# Patient Record
Sex: FEMALE | Race: WHITE | NOT HISPANIC OR LATINO | Employment: FULL TIME | URBAN - METROPOLITAN AREA
[De-identification: names, ages, dates, MRNs, and addresses within clinical notes are randomized per-mention and may not be internally consistent; named-entity substitution may affect disease eponyms.]

---

## 2017-01-31 ENCOUNTER — OFFICE VISIT (OUTPATIENT)
Dept: ENDOCRINOLOGY | Facility: CLINIC | Age: 37
End: 2017-01-31
Payer: COMMERCIAL

## 2017-01-31 VITALS
TEMPERATURE: 98.3 F | HEART RATE: 51 BPM | DIASTOLIC BLOOD PRESSURE: 79 MMHG | HEIGHT: 67 IN | WEIGHT: 258.9 LBS | SYSTOLIC BLOOD PRESSURE: 134 MMHG | BODY MASS INDEX: 40.63 KG/M2 | RESPIRATION RATE: 16 BRPM

## 2017-01-31 DIAGNOSIS — E04.9 GOITER: ICD-10-CM

## 2017-01-31 DIAGNOSIS — R63.5 ABNORMAL WEIGHT GAIN: Primary | ICD-10-CM

## 2017-01-31 DIAGNOSIS — E78.5 HYPERLIPIDEMIA LDL GOAL <130: ICD-10-CM

## 2017-01-31 LAB
ALBUMIN SERPL-MCNC: 3.6 G/DL (ref 3.4–5)
ALP SERPL-CCNC: 71 U/L (ref 40–150)
ALT SERPL W P-5'-P-CCNC: 59 U/L (ref 0–50)
ANION GAP SERPL CALCULATED.3IONS-SCNC: 5 MMOL/L (ref 3–14)
AST SERPL W P-5'-P-CCNC: 31 U/L (ref 0–45)
BILIRUB SERPL-MCNC: 0.3 MG/DL (ref 0.2–1.3)
BUN SERPL-MCNC: 11 MG/DL (ref 7–30)
CALCIUM SERPL-MCNC: 8.8 MG/DL (ref 8.5–10.1)
CHLORIDE SERPL-SCNC: 109 MMOL/L (ref 94–109)
CHOLEST SERPL-MCNC: 161 MG/DL
CO2 SERPL-SCNC: 26 MMOL/L (ref 20–32)
CREAT SERPL-MCNC: 0.73 MG/DL (ref 0.52–1.04)
GFR SERPL CREATININE-BSD FRML MDRD: ABNORMAL ML/MIN/1.7M2
GLUCOSE SERPL-MCNC: 93 MG/DL (ref 70–99)
HDLC SERPL-MCNC: 37 MG/DL
LDLC SERPL CALC-MCNC: 104 MG/DL
NONHDLC SERPL-MCNC: 124 MG/DL
POTASSIUM SERPL-SCNC: 4.2 MMOL/L (ref 3.4–5.3)
PROT SERPL-MCNC: 6.7 G/DL (ref 6.8–8.8)
SODIUM SERPL-SCNC: 140 MMOL/L (ref 133–144)
T3FREE SERPL-MCNC: 2.7 PG/ML (ref 2.3–4.2)
T4 FREE SERPL-MCNC: 1.02 NG/DL (ref 0.76–1.46)
THYROGLOB AB SERPL IA-ACNC: <20 IU/ML (ref 0–40)
THYROPEROXIDASE AB SERPL-ACNC: <10 IU/ML
TRIGL SERPL-MCNC: 100 MG/DL
TSH SERPL DL<=0.05 MIU/L-ACNC: 2.13 MU/L (ref 0.4–4)

## 2017-01-31 PROCEDURE — 84439 ASSAY OF FREE THYROXINE: CPT | Performed by: CLINICAL NURSE SPECIALIST

## 2017-01-31 PROCEDURE — 36415 COLL VENOUS BLD VENIPUNCTURE: CPT | Performed by: CLINICAL NURSE SPECIALIST

## 2017-01-31 PROCEDURE — 84481 FREE ASSAY (FT-3): CPT | Performed by: CLINICAL NURSE SPECIALIST

## 2017-01-31 PROCEDURE — 86800 THYROGLOBULIN ANTIBODY: CPT | Performed by: CLINICAL NURSE SPECIALIST

## 2017-01-31 PROCEDURE — 86376 MICROSOMAL ANTIBODY EACH: CPT | Performed by: CLINICAL NURSE SPECIALIST

## 2017-01-31 PROCEDURE — 80053 COMPREHEN METABOLIC PANEL: CPT | Performed by: CLINICAL NURSE SPECIALIST

## 2017-01-31 PROCEDURE — 99202 OFFICE O/P NEW SF 15 MIN: CPT | Performed by: CLINICAL NURSE SPECIALIST

## 2017-01-31 PROCEDURE — 80061 LIPID PANEL: CPT | Performed by: CLINICAL NURSE SPECIALIST

## 2017-01-31 PROCEDURE — 84443 ASSAY THYROID STIM HORMONE: CPT | Performed by: CLINICAL NURSE SPECIALIST

## 2017-01-31 NOTE — PROGRESS NOTES
Name: Jocelyn Weber  Seen at the request of No ref. provider found For   Chief Complaint   Patient presents with     Thyroid Problem     HPI:  Jocelyn Weber is a 36 year old female who presents for the evaluation of steady weight gain over the years and inability to lose weight with effort.  She is concerned it may be due to thyroid disease.  The only time she was able to lose weight was in  while taking phentermine, was able to lose about 40 lbs.  + fatigue, depression, occasional cold intolerance and constipation.  FH is + for mother with hypothyroidism.  She has two children, ages 5 and 18 months.      PMH/PSH:  Past Medical History   Diagnosis Date     Obesity      Past Surgical History   Procedure Laterality Date      section  3/12     Tonsillectomy       Bunionectomy  96     Hc tooth extraction w/forcep        section N/A 2015     Procedure:  SECTION;  Surgeon: Becky Aceves MD;  [facility]      N/A 2015     Procedure: OUT OF OR RECOVERY;  Surgeon: GENERIC ANESTHESIA PROVIDER;  Location[facility] OR     Blood patch N/A 2015     Procedure: EPIDURAL BLOOD PATCH;  Surgeon: GENERIC ANESTHESIA PROVIDER;  [facility] OR     Family Hx:  Family History   Problem Relation Age of Onset     HEART DISEASE Maternal Grandmother 65     enlarged heart     HEART DISEASE Maternal Grandfather      HEART DISEASE Paternal Grandmother 40     HEART DISEASE Paternal Grandfather      CEREBROVASCULAR DISEASE Maternal Grandfather      CEREBROVASCULAR DISEASE Paternal Grandfather 40     Family History Negative Mother      Family History Negative Father      Thyroid disease: Yes: mother.         DM2: Yes: MGF         Autoimmune: DM1, SLE, RA, Vitiligo No    Social Hx:         MEDICATIONS:  has a current medication list which includes the following Facility-Administered Medications: bupivacaine 0.25 % - epinephrine 1:200,000 and ketorolac.    ROS     ROS: 10 point ROS  "neg other than the symptoms noted above in the HPI.    GENERAL: no weight loss, fevers, chills, malaise, night sweats.   HEENT: no dysphagia, odynophagia, diplopia, neck pain or tenderness  CV: no chest pain, pressure, palpitations, skipped beats, LOC  LUNGS: no SOB, PERDUE, cough, sputum production, wheezing   ABDOMEN: no diarrhea or abdominal pain, + occasional constipation  EXTREMITIES: no rashes, ulcers, edema  NEUROLOGY: no changes in vision, tingling or numbness in hands or feet.   MSK: no muscle aches or pains, weakness  SKIN: no rashes or lesions  ENDOCRINE: no heat or cold intolerance    Physical Exam   VS: /79 mmHg  Pulse 51  Temp(Src) 98.3  F (36.8  C) (Oral)  Resp 16  Ht 1.695 m (5' 6.75\")  Wt 117.436 kg (258 lb 14.4 oz)  BMI 40.88 kg/m2  GENERAL: AXOX3, NAD, well dressed, answering questions appropriately, appears stated age.  HEENT: no exophthalmos, no proptosis, EOMI, no lig lag, no retraction  NECK:  Supple, right-sided thyroid gland enlargement, left side palpably normal, no tenderness with palpation, no adenopathy.  CV: RRR, no rubs, gallops, no murmurs  LUNGS: CTAB, no wheezes, rales, or rhonchi  ABDOMEN: soft, nontender, nondistended  EXTREMITIES: no edema, +pulses, no rashes, no lesions  NEUROLOGY: CN grossly intact, no tremors  MSK: grossly intact  SKIN: no rashes, no lesions    LABS:  Vital Signs 9/30/2015 11/18/2016 1/31/2017   Weight (LB) 224 lb 265 lb 1 oz 258 lb 14.4 oz   Height  5' 7\" 5' 6.75\"   BMI  41.6 40.94     All pertinent notes, labs, and images personally reviewed by me.     A/P  Ms.Jonelle Weber is a 36 year old here for the evaluation of:    1. Abnormal weight gain    2. Goiter    3. Hyperlipidemia LDL goal <130        Labs ordered today:   Orders Placed This Encounter   Procedures     US Head Neck Soft Tissue     TSH     T4 FREE     T3 Free     Anti thyroglobulin antibody     Thyroid peroxidase antibody     Comprehensive metabolic panel     Lipid panel reflex to " direct LDL     Radiology/Consults ordered today: US HEAD NECK SOFT TISSUE    More than 50% of the time spent with Ms. Weber on counseling / coordinating her care.  Total face to face time was greater than or equal to 25 minutes.      Follow-up:  To be determined based on test results.    Nancy Goodson NP  Endocrinology  [facility]  CC: Carri Collins Ra

## 2017-01-31 NOTE — NURSING NOTE
"Chief Complaint   Patient presents with     Thyroid Problem     Initial /79 mmHg  Pulse 51  Temp(Src) 98.3  F (36.8  C) (Oral)  Resp 16  Ht 5' 6.75\" (1.695 m)  Wt 258 lb 14.4 oz (117.436 kg)  BMI 40.88 kg/m2 Estimated body mass index is 40.88 kg/(m^2) as calculated from the following:    Height as of this encounter: 5' 6.75\" (1.695 m).    Weight as of this encounter: 258 lb 14.4 oz (117.436 kg)..  BP completed using cuff size regular    Waist:  45\"    Shari Schoenberger, CMA    "

## 2017-02-02 NOTE — PROGRESS NOTES
Quick Note:    Jocelyn,  Your thyroid antibodies were not elevated at this time so no apparent autoimmune thyroid disease.  Your thyroid levels are also in a good range at this time, TSH looks great at just slightly over 2 with a healthy T4 hormone level.  I'll let you know the thyroid ultrasound results when available and also the saliva cortisol.  If you are interested in weight management medication, please schedule a visit with me to discuss and start.  Let me know if you have any questions.  Nancy Goodson NP  Endocrinology    ______

## 2017-02-05 PROCEDURE — 82530 CORTISOL FREE: CPT | Mod: 90 | Performed by: INTERNAL MEDICINE

## 2017-02-05 PROCEDURE — 99000 SPECIMEN HANDLING OFFICE-LAB: CPT | Performed by: INTERNAL MEDICINE

## 2017-02-07 DIAGNOSIS — E04.9 GOITER: ICD-10-CM

## 2017-02-07 DIAGNOSIS — E78.5 HYPERLIPIDEMIA LDL GOAL <130: ICD-10-CM

## 2017-02-07 DIAGNOSIS — R63.5 ABNORMAL WEIGHT GAIN: ICD-10-CM

## 2017-02-09 LAB
COLLECT TME SPEC: 2300
CORTIS SAL-MCNC: 0.06 UG/DL

## 2017-02-15 NOTE — PROGRESS NOTES
Jocelyn,  Your salivary cortisol result was normal.  Here's a copy for your records.  Nancy Goodson NP  Endocrinology

## 2017-03-09 ENCOUNTER — OFFICE VISIT (OUTPATIENT)
Dept: ENDOCRINOLOGY | Facility: CLINIC | Age: 37
End: 2017-03-09
Payer: COMMERCIAL

## 2017-03-09 VITALS
HEART RATE: 50 BPM | SYSTOLIC BLOOD PRESSURE: 114 MMHG | RESPIRATION RATE: 12 BRPM | BODY MASS INDEX: 40.74 KG/M2 | DIASTOLIC BLOOD PRESSURE: 72 MMHG | WEIGHT: 259.6 LBS | HEIGHT: 67 IN

## 2017-03-09 DIAGNOSIS — E78.5 HYPERLIPIDEMIA LDL GOAL <130: ICD-10-CM

## 2017-03-09 DIAGNOSIS — E66.01 MORBID OBESITY DUE TO EXCESS CALORIES (H): Primary | ICD-10-CM

## 2017-03-09 PROCEDURE — 99214 OFFICE O/P EST MOD 30 MIN: CPT | Performed by: CLINICAL NURSE SPECIALIST

## 2017-03-09 RX ORDER — PHENTERMINE HYDROCHLORIDE 37.5 MG/1
37.5 TABLET ORAL
Qty: 32 TABLET | Refills: 0 | Status: SHIPPED | OUTPATIENT
Start: 2017-03-09 | End: 2017-03-09

## 2017-03-09 RX ORDER — PHENTERMINE HYDROCHLORIDE 37.5 MG/1
37.5 TABLET ORAL
Qty: 32 TABLET | Refills: 0 | Status: SHIPPED | OUTPATIENT
Start: 2017-03-09 | End: 2017-04-12

## 2017-03-09 NOTE — NURSING NOTE
"Chief Complaint   Patient presents with     Follow Up For     weight loss       Initial /72 (BP Location: Left arm, Cuff Size: Adult Regular)  Pulse 50  Resp 12  Ht 1.695 m (5' 6.75\")  Wt 117.8 kg (259 lb 9.6 oz)  BMI 40.96 kg/m2 Estimated body mass index is 40.96 kg/(m^2) as calculated from the following:    Height as of this encounter: 1.695 m (5' 6.75\").    Weight as of this encounter: 117.8 kg (259 lb 9.6 oz).  Medication Reconciliation: complete     Waist:51\"    Kristine Silva [facility]    3/9/2017  8:59 AM      "

## 2017-03-09 NOTE — MR AVS SNAPSHOT
After Visit Summary   3/9/2017    Jocelyn Weber    MRN: [id number]           Patient Information     Date Of Birth          1980        Visit Information        Provider Department      3/9/2017 9:00 AM Nancy Goodson APRN CNP [facility]        Today's Diagnoses     Morbid obesity due to excess calories (H)    -  1    BMI 40.0-44.9, adult (H)        Hyperlipidemia LDL goal <130           Follow-ups after your visit        Follow-up notes from your care team     Return in about 4 weeks (around 4/6/2017).      Who to contact     If you have questions or need follow up information about today's clinic visit or your schedule please contact [facility] directly at [phone].  Normal or non-critical lab and imaging results will be communicated to you by Akerminhart, letter or phone within 4 business days after the clinic has received the results. If you do not hear from us within 7 days, please contact the clinic through Akerminhart or phone. If you have a critical or abnormal lab result, we will notify you by phone as soon as possible.  Submit refill requests through MeetMoi or call your pharmacy and they will forward the refill request to us. Please allow 3 business days for your refill to be completed.          Additional Information About Your Visit        MyChart Information     MeetMoi gives you secure access to your electronic health record. If you see a primary care provider, you can also send messages to your care team and make appointments. If you have questions, please call your primary care clinic.  If you do not have a primary care provider, please call [phone] and they will assist you.        Care EveryWhere ID     This is your Care EveryWhere ID. This could be used by other organizations to access your [place] medical records  [id number]        Your Vitals Were     Pulse Respirations Height BMI (Body Mass Index)          50 12 1.695 m (5'  "6.75\") 40.96 kg/m2         Blood Pressure from Last 3 Encounters:   03/09/17 114/72   01/31/17 134/79   11/18/16 130/62    Weight from Last 3 Encounters:   03/09/17 117.8 kg (259 lb 9.6 oz)   01/31/17 117.4 kg (258 lb 14.4 oz)   11/18/16 120.2 kg (265 lb 1 oz)              Today, you had the following     No orders found for display         Today's Medication Changes          These changes are accurate as of: 3/9/17 10:13 AM.  If you have any questions, ask your nurse or doctor.               Start taking these medicines.        Dose/Directions    phentermine 37.5 MG tablet   Commonly known as:  ADIPEX-P   Used for:  Morbid obesity due to excess calories (H)   Started by:  Nancy Goodson APRN CNP        Dose:  37.5 mg   Take 1 tablet (37.5 mg) by mouth every morning (before breakfast)   Quantity:  32 tablet   Refills:  0            Where to get your medicines      Some of these will need a paper prescription and others can be bought over the counter.  Ask your nurse if you have questions.     Bring a paper prescription for each of these medications     phentermine 37.5 MG tablet                Primary Care Provider Office Phone # Fax #    ALO Stevens Ra [facility] [phone]       [facility] [address] [facility] [address]        Thank you!     Thank you for choosing [facility]  for your care. Our goal is always to provide you with excellent care. Hearing back from our patients is one way we can continue to improve our services. Please take a few minutes to complete the written survey that you may receive in the mail after your visit with us. Thank you!             Your Updated Medication List - Protect others around you: Learn how to safely use, store and throw away your medicines at www.disposemymeds.org.          This list is accurate as of: 3/9/17 10:13 AM.  Always use your most recent med list.                   Brand Name Dispense Instructions for use "    phentermine 37.5 MG tablet    ADIPEX-P    32 tablet    Take 1 tablet (37.5 mg) by mouth every morning (before breakfast)

## 2017-04-12 ENCOUNTER — OFFICE VISIT (OUTPATIENT)
Dept: ENDOCRINOLOGY | Facility: CLINIC | Age: 37
End: 2017-04-12
Payer: COMMERCIAL

## 2017-04-12 VITALS
HEART RATE: 57 BPM | BODY MASS INDEX: 39.15 KG/M2 | DIASTOLIC BLOOD PRESSURE: 76 MMHG | HEIGHT: 67 IN | SYSTOLIC BLOOD PRESSURE: 118 MMHG | WEIGHT: 249.4 LBS

## 2017-04-12 DIAGNOSIS — E66.01 MORBID OBESITY DUE TO EXCESS CALORIES (H): ICD-10-CM

## 2017-04-12 PROCEDURE — 99214 OFFICE O/P EST MOD 30 MIN: CPT | Performed by: CLINICAL NURSE SPECIALIST

## 2017-04-12 RX ORDER — PHENTERMINE HYDROCHLORIDE 37.5 MG/1
37.5 TABLET ORAL
Qty: 32 TABLET | Refills: 2 | Status: SHIPPED | OUTPATIENT
Start: 2017-04-12 | End: 2017-07-26

## 2017-04-12 NOTE — NURSING NOTE
"Chief Complaint   Patient presents with     Follow Up For     Weight       Initial /76 (BP Location: Left arm, Cuff Size: Adult Regular)  Pulse 57  Ht 1.695 m (5' 6.75\")  Wt 113.1 kg (249 lb 6.4 oz)  BMI 39.35 kg/m2 Estimated body mass index is 39.35 kg/(m^2) as calculated from the following:    Height as of this encounter: 1.695 m (5' 6.75\").    Weight as of this encounter: 113.1 kg (249 lb 6.4 oz).  Medication Reconciliation: complete     Waist: 46.75in    Kristine Silva CMA    4/12/2017  3:57 PM      "

## 2017-04-12 NOTE — PROGRESS NOTES
Name: Jocelyn Weber (Last seen 3/9/2017)  F/u For   Chief Complaint   Patient presents with     Follow Up For     Weight     HPI:  Jocelyn Weber is a 36 year old female who presents for the evaluation/management of steady weight gain over the years and inability to lose weight with effort.  She is concerned it may be due to thyroid disease.      The only time she was able to lose weight was in  while taking phentermine, was able to lose about 40 lbs.  FH is + for mother with hypothyroidism.  Recent labs were unremarkable, thyroid antibodies were not elevated, TSH was < 3 with normal T3 and T4 levels.    She has two children, ages 5 and 18 months.    She was interested in starting weight loss medication.  Phentermine was started 3/9/2017.  Some occasional insomnia, + dry mouth, + occasional constipation.  Nothing significant.    PMH/PSH:  Past Medical History:   Diagnosis Date     Obesity      Past Surgical History:   Procedure Laterality Date     BLOOD PATCH N/A 2015    Procedure: EPIDURAL BLOOD PATCH;  Surgeon: GENERIC ANESTHESIA PROVIDER;  [facility]     BUNIONECTOMY  96      SECTION  3/12      SECTION N/A 2015    Procedure:  SECTION;  Surgeon: Becky Aceves MD;  [facility]     HC TOOTH EXTRACTION W/FORCEP       TONSILLECTOMY       Family Hx:  Family History   Problem Relation Age of Onset     HEART DISEASE Maternal Grandmother 65     enlarged heart     HEART DISEASE Maternal Grandfather      HEART DISEASE Paternal Grandmother 40     HEART DISEASE Paternal Grandfather      CEREBROVASCULAR DISEASE Maternal Grandfather      CEREBROVASCULAR DISEASE Paternal Grandfather 40     Family History Negative Mother      Family History Negative Father      Thyroid disease: Yes: mother.         DM2: Yes: MGF         Autoimmune: DM1, SLE, RA, Vitiligo No    Social Hx:         MEDICATIONS:  has a current medication list which includes the following  "prescription(s): phentermine, and the following Facility-Administered Medications: bupivacaine 0.25 % - epinephrine 1:200,000 and ketorolac.    ROS     ROS: 10 point ROS neg other than the symptoms noted above in the HPI.    GENERAL: + intentional weight loss.  No fevers, chills, malaise, or night sweats.   HEENT: no dysphagia, odynophagia, diplopia, neck pain or tenderness  CV: no chest pain, pressure, palpitations, skipped beats, LOC  LUNGS: no SOB, PERDUE, cough, sputum production, wheezing   ABDOMEN: no diarrhea or abdominal pain, + occasional constipation  EXTREMITIES: no rashes, ulcers, edema  NEUROLOGY: no changes in vision, tingling or numbness in hands or feet.   MSK: no muscle aches or pains, weakness  SKIN: no rashes or lesions  ENDOCRINE: no heat or cold intolerance    Physical Exam   VS: /76 (BP Location: Left arm, Cuff Size: Adult Regular)  Pulse 57  Ht 1.695 m (5' 6.75\")  Wt 113.1 kg (249 lb 6.4 oz)  BMI 39.35 kg/m2  GENERAL: AXOX3, NAD, well dressed, answering questions appropriately, appears stated age.  HEENT: no exophthalmos, no proptosis, EOMI, no lig lag, no retraction  NECK:  Supple, thyroid gland palpably full, no tenderness with palpation, no adenopathy.  CV: RRR, no rubs, gallops, no murmurs  LUNGS: CTAB, no wheezes, rales, or rhonchi  ABDOMEN: soft, nontender, nondistended  EXTREMITIES: no edema, +pulses, no rashes, no lesions  NEUROLOGY: CN grossly intact, no tremors  MSK: grossly intact  SKIN: no rashes, no lesions    LABS:  Component    Latest Ref Rng & Units 1/31/2017   TSH    0.40 - 4.00 mU/L 2.13   T4 Free    0.76 - 1.46 ng/dL 1.02   Free T3    2.3 - 4.2 pg/mL 2.7   Thyroglobulin Antibody    <40 IU/mL <20   Thyroid Peroxidase Antibody    <35 IU/mL <10     !COMPREHENSIVE Latest Ref Rng & Units 1/31/2017   SODIUM 133 - 144 mmol/L 140   POTASSIUM 3.4 - 5.3 mmol/L 4.2   CHLORIDE 94 - 109 mmol/L 109   BUN 7 - 30 mg/dL 11   Creatinine 0.52 - 1.04 mg/dL 0.73   Glucose 70 - 99 mg/dL 93 " "  ANION GAP 3 - 14 mmol/L 5   CALCIUM 8.5 - 10.1 mg/dL 8.8   ALBUMIN 3.4 - 5.0 g/dL 3.6     !LIPID/HEPATIC Latest Ref Rng & Units 4/9/2014 1/31/2017   CHOLESTEROL <200 mg/dL 231 (H) 161   TRIGLYCERIDES <150 mg/dL 167 (H) 100   HDL CHOLESTEROL >49 mg/dL 41 (L) 37 (L)   LDL CHOLESTEROL, CALCULATED <100 mg/dL 157 (H) 104 (H)   VLDL-CHOLESTEROL 0 - 30 mg/dL 33 (H)    NON HDL CHOLESTEROL <130 mg/dL  124   CHOLESTEROL/HDL RATIO 0.0 - 5.0 5.7 (H)    AST 0 - 45 U/L  31   ALT 0 - 50 U/L  59 (H)     Component    Latest Ref Rng & Units 2/5/2017   Saliva Collection Time     2300   Cortisol Saliva    ug/dL 0.057     Vital Signs 9/30/2015 11/18/2016 1/31/2017 4/12/2017   Weight (LB) 224 lb 265 lb 1 oz 258 lb 14.4 oz 249 lb 6.4 oz   Height  5' 7\" 5' 6.75\" 5' 6.75\"   BMI  41.6 40.94 39.35   Waist Circumference: 51\" (3/9/17).  Waist circumference 46.75\" (4/12/2017).    All pertinent notes, labs, and images personally reviewed by me.     A/P  Ms.Jonelle Weber is a 36 year old here for the evaluation of:    No diagnosis found.  I informed the pt that:  1.Weight loss medications can be taken to assist with weight reduction when combined with appropriate healthy lifestyle changes.  2.I discussed possible s/e, risks and benefits of weight loss medications.  3.All medications are FDA approved, however, some may be used ''off label'' for their weight loss benefits and some ''short term'' medications can be used for longer periods to achieve desired clinical outcomes.  4.All patients taking weight loss medications must be seen in clinic for refill authorization.    Counseling exercise ( V65.41) - trying to exercise daily - Wii dance with her daughter after dinner  Dietary counseling( V65.3) - tracking intake using calorie tracking kendrick  Calculated BMI above the upper parameter and f/u plan was documented in the medical record.  Discussed indications, risks and benefits of all medications prescribed, and answered questions to patient's " satisfaction.  Has lost 9 lbs since starting phentermine + diet and exercise efforts, 258-->249 lbs.  Continue Phentermine 37.5 mg qd.    Hyperlipidemia - Currently untreated.  Total and LDL cholesterol has improved compared with 2014.  I would expect some further improvement with diet and exercise efforts.  Will continue to monitor.    Labs ordered today:   No orders of the defined types were placed in this encounter.    Radiology/Consults ordered today: None    More than 50% of the time spent with Ms. Weber on counseling / coordinating her care.  Total face to face time was greater than or equal to 25 minutes.      Follow-up:  3 months    Nancy Goodson NP  Endocrinology  [facility]  CC: Carri Collins Ra

## 2017-04-12 NOTE — MR AVS SNAPSHOT
"              After Visit Summary   4/12/2017    Jocelyn Weber    MRN: [id number]           Patient Information     Date Of Birth          1980        Visit Information        Provider Department      4/12/2017 4:00 PM Nancy Goodson APRN CNP [facility]        Today's Diagnoses     Morbid obesity due to excess calories (H)           Follow-ups after your visit        Follow-up notes from your care team     Return in about 3 months (around 7/12/2017).      Who to contact     If you have questions or need follow up information about today's clinic visit or your schedule please contact [facility] directly at [phone].  Normal or non-critical lab and imaging results will be communicated to you by MyChart, letter or phone within 4 business days after the clinic has received the results. If you do not hear from us within 7 days, please contact the clinic through mediaBunkerhart or phone. If you have a critical or abnormal lab result, we will notify you by phone as soon as possible.  Submit refill requests through SERVICEINFINITY or call your pharmacy and they will forward the refill request to us. Please allow 3 business days for your refill to be completed.          Additional Information About Your Visit        MyChart Information     SERVICEINFINITY gives you secure access to your electronic health record. If you see a primary care provider, you can also send messages to your care team and make appointments. If you have questions, please call your primary care clinic.  If you do not have a primary care provider, please call [phone] and they will assist you.        Care EveryWhere ID     This is your Care EveryWhere ID. This could be used by other organizations to access your [place] medical records  [id number]        Your Vitals Were     Pulse Height BMI (Body Mass Index)             57 1.695 m (5' 6.75\") 39.35 kg/m2          Blood Pressure from Last 3 Encounters:   04/12/17 " 118/76   03/09/17 114/72   01/31/17 134/79    Weight from Last 3 Encounters:   04/12/17 113.1 kg (249 lb 6.4 oz)   03/09/17 117.8 kg (259 lb 9.6 oz)   01/31/17 117.4 kg (258 lb 14.4 oz)              Today, you had the following     No orders found for display         Where to get your medicines      Some of these will need a paper prescription and others can be bought over the counter.  Ask your nurse if you have questions.     Bring a paper prescription for each of these medications     phentermine 37.5 MG tablet          Primary Care Provider Office Phone # Fax #    Carri ALO Sigala [facility] [phone]       [facility] [address] [facility] [address]        Thank you!     Thank you for choosing [facility]  for your care. Our goal is always to provide you with excellent care. Hearing back from our patients is one way we can continue to improve our services. Please take a few minutes to complete the written survey that you may receive in the mail after your visit with us. Thank you!             Your Updated Medication List - Protect others around you: Learn how to safely use, store and throw away your medicines at www.disposemymeds.org.          This list is accurate as of: 4/12/17  4:24 PM.  Always use your most recent med list.                   Brand Name Dispense Instructions for use    phentermine 37.5 MG tablet    ADIPEX-P    32 tablet    Take 1 tablet (37.5 mg) by mouth every morning (before breakfast)

## 2017-05-16 ENCOUNTER — MYC REFILL (OUTPATIENT)
Dept: ENDOCRINOLOGY | Facility: CLINIC | Age: 37
End: 2017-05-16

## 2017-05-16 DIAGNOSIS — E66.01 MORBID OBESITY DUE TO EXCESS CALORIES (H): ICD-10-CM

## 2017-05-16 RX ORDER — PHENTERMINE HYDROCHLORIDE 37.5 MG/1
37.5 TABLET ORAL
Qty: 32 TABLET | Refills: 2 | Status: CANCELLED | OUTPATIENT
Start: 2017-05-16

## 2017-05-16 NOTE — TELEPHONE ENCOUNTER
Pending Prescriptions:                       Disp   Refills    phentermine (ADIPEX-P) 37.5 MG tablet     32 tab*2            Sig: Take 1 tablet (37.5 mg) by mouth every morning           (before breakfast)          Last Written Prescription Date: 4/12/17  Last Fill Quantity: 32,  # refills: 2   Last Office Visit with [facility], P or [facility] prescribing provider: 4/12/17    Shari Schoenberger, CMA

## 2017-05-16 NOTE — TELEPHONE ENCOUNTER
Message from MyChart:  Original authorizing provider: ALO Lei CNP would like a refill of the following medications:  phentermine (ADIPEX-P) 37.5 MG tablet [ALO Lei CNP]    Preferred pharmacy: [facility] - [facility]     Comment:

## 2017-05-17 NOTE — TELEPHONE ENCOUNTER
She should still have a refill on the prescription I gave her - let her know.  Nancy Goodson NP  Endocrinology

## 2017-05-17 NOTE — TELEPHONE ENCOUNTER
LM that she has 2 refills available yet  To CB is has questions    Aleja Jones RN, BS  Clinical Nurse Triage.

## 2017-05-18 ENCOUNTER — HOSPITAL ENCOUNTER (OUTPATIENT)
Dept: ULTRASOUND IMAGING | Facility: CLINIC | Age: 37
Discharge: HOME | End: 2017-05-18
Attending: CLINICAL NURSE SPECIALIST | Admitting: CLINICAL NURSE SPECIALIST
Payer: COMMERCIAL

## 2017-05-18 DIAGNOSIS — R63.5 ABNORMAL WEIGHT GAIN: ICD-10-CM

## 2017-05-18 DIAGNOSIS — E78.5 HYPERLIPIDEMIA LDL GOAL <130: ICD-10-CM

## 2017-05-18 DIAGNOSIS — E04.9 GOITER: ICD-10-CM

## 2017-05-18 PROCEDURE — 76536 US EXAM OF HEAD AND NECK: CPT

## 2017-05-19 NOTE — PROGRESS NOTES
Jocelyn,  Your thyroid ultrasound did not show any nodules.   Your thyroid gland is slightly enlarged but otherwise normal.  Here's a copy of the result for your records.  Nancy Goodson NP  Endocrinology

## 2017-07-26 ENCOUNTER — OFFICE VISIT (OUTPATIENT)
Dept: ENDOCRINOLOGY | Facility: CLINIC | Age: 37
End: 2017-07-26
Payer: COMMERCIAL

## 2017-07-26 VITALS
DIASTOLIC BLOOD PRESSURE: 72 MMHG | RESPIRATION RATE: 16 BRPM | SYSTOLIC BLOOD PRESSURE: 112 MMHG | BODY MASS INDEX: 37.87 KG/M2 | TEMPERATURE: 97.8 F | WEIGHT: 240 LBS | HEART RATE: 50 BPM

## 2017-07-26 DIAGNOSIS — E78.5 HYPERLIPIDEMIA LDL GOAL <130: ICD-10-CM

## 2017-07-26 DIAGNOSIS — E66.01 MORBID OBESITY DUE TO EXCESS CALORIES (H): ICD-10-CM

## 2017-07-26 PROCEDURE — 99214 OFFICE O/P EST MOD 30 MIN: CPT | Performed by: CLINICAL NURSE SPECIALIST

## 2017-07-26 RX ORDER — PHENTERMINE HYDROCHLORIDE 37.5 MG/1
37.5 TABLET ORAL
Qty: 32 TABLET | Refills: 2 | Status: SHIPPED | OUTPATIENT
Start: 2017-07-26 | End: 2017-12-06

## 2017-07-26 NOTE — NURSING NOTE
"Chief Complaint   Patient presents with     Weight Problem     management       Initial /72 (BP Location: Left arm, Patient Position: Chair, Cuff Size: Adult Large)  Pulse 50  Temp 97.8  F (36.6  C) (Oral)  Resp 16  Wt 240 lb (108.9 kg)  LMP 07/02/2017  Breastfeeding? No  BMI 37.87 kg/m2 Estimated body mass index is 37.87 kg/(m^2) as calculated from the following:    Height as of 4/12/17: 5' 6.75\" (1.695 m).    Weight as of this encounter: 240 lb (108.9 kg).  Medication Reconciliation: complete  Cornelia Chowdhury M.A.  "

## 2017-07-26 NOTE — MR AVS SNAPSHOT
After Visit Summary   7/26/2017    Jocelyn Weber    MRN: [id number]           Patient Information     Date Of Birth          1980        Visit Information        Provider Department      7/26/2017 10:00 AM Nancy Goodson APRN CNP [facility]        Today's Diagnoses     BMI 37.0-37.9, adult    -  1    Morbid obesity due to excess calories (H)        Hyperlipidemia LDL goal <130           Follow-ups after your visit        Follow-up notes from your care team     Return in about 3 months (around 10/26/2017).      Who to contact     If you have questions or need follow up information about today's clinic visit or your schedule please contact [facility] directly at [phone].  Normal or non-critical lab and imaging results will be communicated to you by OnAsset Intelligencehart, letter or phone within 4 business days after the clinic has received the results. If you do not hear from us within 7 days, please contact the clinic through OnAsset Intelligencehart or phone. If you have a critical or abnormal lab result, we will notify you by phone as soon as possible.  Submit refill requests through [employer] or call your pharmacy and they will forward the refill request to us. Please allow 3 business days for your refill to be completed.          Additional Information About Your Visit        MyChart Information     [employer] gives you secure access to your electronic health record. If you see a primary care provider, you can also send messages to your care team and make appointments. If you have questions, please call your primary care clinic.  If you do not have a primary care provider, please call [phone] and they will assist you.        Care EveryWhere ID     This is your Care EveryWhere ID. This could be used by other organizations to access your [place] medical records  [id number]        Your Vitals Were     Pulse Temperature Respirations Last Period Breastfeeding? BMI (Body Mass  Index)    50 97.8  F (36.6  C) (Oral) 16 07/02/2017 No 37.87 kg/m2       Blood Pressure from Last 3 Encounters:   07/26/17 112/72   04/12/17 118/76   03/09/17 114/72    Weight from Last 3 Encounters:   07/26/17 108.9 kg (240 lb)   04/12/17 113.1 kg (249 lb 6.4 oz)   03/09/17 117.8 kg (259 lb 9.6 oz)              Today, you had the following     No orders found for display         Where to get your medicines      Some of these will need a paper prescription and others can be bought over the counter.  Ask your nurse if you have questions.     Bring a paper prescription for each of these medications     phentermine 37.5 MG tablet          Primary Care Provider Office Phone # Fax #    Acrri ALO Sigala [facility] [phone]       [facility] [place] [id number] [facility] [address]        Equal Access to Services     BRODY QUARLES : Hadii aad ku hadasho Soomaali, waaxda luqadaha, qaybta kaalmada adeegyada, waxay idiin hayaan tanner bruno . So [facility] [phone].    ATENCIÓN: Si habla español, tiene a maldonado disposición servicios gratuitos de asistencia lingüística. Llame al [phone].    We comply with applicable federal civil rights laws and [place] laws. We do not discriminate on the basis of race, color, national origin, age, disability sex, sexual orientation or gender identity.            Thank you!     Thank you for choosing [facility]  for your care. Our goal is always to provide you with excellent care. Hearing back from our patients is one way we can continue to improve our services. Please take a few minutes to complete the written survey that you may receive in the mail after your visit with us. Thank you!             Your Updated Medication List - Protect others around you: Learn how to safely use, store and throw away your medicines at www.disposemymeds.org.          This list is accurate as of: 7/26/17 10:44 AM.  Always use your most recent med list.                    Brand Name Dispense Instructions for use Diagnosis    phentermine 37.5 MG tablet    ADIPEX-P    32 tablet    Take 1 tablet (37.5 mg) by mouth every morning (before breakfast)    Morbid obesity due to excess calories (H)

## 2017-07-26 NOTE — PROGRESS NOTES
Name: Jocelyn Weber (Last seen 2017)  F/u For   Chief Complaint   Patient presents with     Weight Problem     management     HPI:  Jocelyn Weber is a 37 year old female who presents for the evaluation/management of steady weight gain over the years and inability to lose weight with effort.  She is concerned it may be due to thyroid disease.      The only time she was able to lose weight was in  while taking phentermine, was able to lose about 40 lbs.  FH is + for mother with hypothyroidism.  Recent labs were unremarkable, thyroid antibodies were not elevated, TSH was < 3 with normal T3 and T4 levels.    She has two children, ages 5 and 18 months.    She was interested in starting weight loss medication.  Phentermine was started 3/9/2017.  Some occasional insomnia, + dry mouth, + occasional constipation.  Nothing significant.    Continues to tolerate phentermine well and it is working well to suppress her appetite.    PMH/PSH:  Past Medical History:   Diagnosis Date     Obesity      Past Surgical History:   Procedure Laterality Date     BLOOD PATCH N/A 2015    Procedure: EPIDURAL BLOOD PATCH;  Surgeon: GENERIC ANESTHESIA PROVIDER;  Location: [facility] OR     BUNIONECTOMY  96      SECTION  3/12      SECTION N/A 2015    Procedure:  SECTION;  Surgeon: Becky Aceves MD;  Location: [facility] L+D     HC TOOTH EXTRACTION W/FORCEP       TONSILLECTOMY       Family Hx:  Family History   Problem Relation Age of Onset     Family History Negative Mother      Family History Negative Father      HEART DISEASE Maternal Grandmother 65     enlarged heart     HEART DISEASE Maternal Grandfather      CEREBROVASCULAR DISEASE Maternal Grandfather      HEART DISEASE Paternal Grandmother 40     HEART DISEASE Paternal Grandfather      CEREBROVASCULAR DISEASE Paternal Grandfather 40     Thyroid disease: Yes: mother.         DM2: Yes: MGF         Autoimmune: DM1, SLE, RA, Vitiligo  No    Social Hx:         MEDICATIONS:  has a current medication list which includes the following prescription(s): phentermine, and the following Facility-Administered Medications: bupivacaine 0.25 % - epinephrine 1:200,000 and ketorolac.    ROS     ROS: 10 point ROS neg other than the symptoms noted above in the HPI.    GENERAL: + intentional weight loss.  No fevers, chills, malaise, or night sweats.   HEENT: no dysphagia, odynophagia, diplopia, neck pain or tenderness  CV: no chest pain, pressure, palpitations, skipped beats, LOC  LUNGS: no SOB, PERDUE, cough, sputum production, wheezing   ABDOMEN: no diarrhea or abdominal pain, + occasional constipation  EXTREMITIES: no rashes, ulcers, edema  NEUROLOGY: no changes in vision, tingling or numbness in hands or feet.   MSK: no muscle aches or pains, weakness  SKIN: no rashes or lesions  ENDOCRINE: no heat or cold intolerance    Physical Exam   VS: /72 (BP Location: Left arm, Patient Position: Chair, Cuff Size: Adult Large)  Pulse 50  Temp 97.8  F (36.6  C) (Oral)  Resp 16  Wt 108.9 kg (240 lb)  LMP 07/02/2017  Breastfeeding? No  BMI 37.87 kg/m2  GENERAL: NAD, well dressed, answering questions appropriately, appears stated age.  HEENT: OP clear, no exopthalmous, no proptosis, EOMI, no lig lag, no retraction, no scleral icterus  RESPIRATORY: Clear. Normal respiratory effort.  CARDIOVASCULAR: RRR. No peripheral edema.  EXTREMITIES: no edema, +pulses, no rashes, no lesions  NEUROLOGY: CN grossly intact, no tremors  MSK: grossly intact, No digital cyanosis. Normal gait and station.  SKIN: no rashes, no lesions, no ulcers  PSYCH: Intact judgment and insight. A&OX3 with a cordial affect.    LABS:  Component    Latest Ref Rng & Units 1/31/2017   TSH    0.40 - 4.00 mU/L 2.13   T4 Free    0.76 - 1.46 ng/dL 1.02   Free T3    2.3 - 4.2 pg/mL 2.7   Thyroglobulin Antibody    <40 IU/mL <20   Thyroid Peroxidase Antibody    <35 IU/mL <10     !COMPREHENSIVE Latest Ref Rng &  "Units 1/31/2017   SODIUM 133 - 144 mmol/L 140   POTASSIUM 3.4 - 5.3 mmol/L 4.2   CHLORIDE 94 - 109 mmol/L 109   BUN 7 - 30 mg/dL 11   Creatinine 0.52 - 1.04 mg/dL 0.73   Glucose 70 - 99 mg/dL 93   ANION GAP 3 - 14 mmol/L 5   CALCIUM 8.5 - 10.1 mg/dL 8.8   ALBUMIN 3.4 - 5.0 g/dL 3.6     !LIPID/HEPATIC Latest Ref Rng & Units 4/9/2014 1/31/2017   CHOLESTEROL <200 mg/dL 231 (H) 161   TRIGLYCERIDES <150 mg/dL 167 (H) 100   HDL CHOLESTEROL >49 mg/dL 41 (L) 37 (L)   LDL CHOLESTEROL, CALCULATED <100 mg/dL 157 (H) 104 (H)   VLDL-CHOLESTEROL 0 - 30 mg/dL 33 (H)    NON HDL CHOLESTEROL <130 mg/dL  124   CHOLESTEROL/HDL RATIO 0.0 - 5.0 5.7 (H)    AST 0 - 45 U/L  31   ALT 0 - 50 U/L  59 (H)     Component    Latest Ref Rng & Units 2/5/2017   Saliva Collection Time     2300   Cortisol Saliva    ug/dL 0.057     Vital Signs 9/30/2015 11/18/2016 1/31/2017 4/12/2017   Weight (LB) 224 lb 265 lb 1 oz 258 lb 14.4 oz 249 lb 6.4 oz   Height  5' 7\" 5' 6.75\" 5' 6.75\"   BMI  41.6 40.94 39.35     Vital Signs 7/26/2017   Weight (LB) 240 lb   Height    BMI (Calculated) 37.87       Waist Circumference: 51\" (3/9/17).  Waist circumference 46.75\" (4/12/2017).  47.75\" (today)    All pertinent notes, labs, and images personally reviewed by me.     A/P  Ms.Jonelle Weber is a 37 year old here for the evaluation of:    1. BMI 37.0-37.9, adult    2. Morbid obesity due to excess calories (H)    3. Hyperlipidemia LDL goal <130      I informed the pt that:  1.Weight loss medications can be taken to assist with weight reduction when combined with appropriate healthy lifestyle changes.  2.I discussed possible s/e, risks and benefits of weight loss medications.  3.All medications are FDA approved, however, some may be used ''off label'' for their weight loss benefits and some ''short term'' medications can be used for longer periods to achieve desired clinical outcomes.  4.All patients taking weight loss medications must be seen in clinic for refill " authorization.    Counseling exercise ( V65.41) - trying to exercise daily - José Miguel dance with her daughter after dinner  Dietary counseling( V65.3) - tracking intake using calorie tracking kendrick  Calculated BMI above the upper parameter and f/u plan was documented in the medical record.  Discussed indications, risks and benefits of all medications prescribed, and answered questions to patient's satisfaction.  Has lost 9 lbs since starting phentermine + diet and exercise efforts, 258-->249-->240 lbs.  BMI has decreased 40.94-->37.87.  Total weight loss since starting Phentermine - 18 lbs; since last seen 9 lbs.  Continue Phentermine 37.5 mg qd.    Hyperlipidemia - Currently untreated.  Total and LDL cholesterol has improved compared with 2014.  I would expect some further improvement with diet and exercise efforts.  Will continue to monitor.    Labs ordered today:   No orders of the defined types were placed in this encounter.    Radiology/Consults ordered today: None    More than 50% of the time spent with Ms. Weber on counseling / coordinating her care.  Total face to face time was greater than or equal to 25 minutes.      Follow-up:  3 months    Nancy Goodson NP  Endocrinology  [facility]  CC: Carri Collins Ra

## 2017-09-25 ENCOUNTER — TELEPHONE (OUTPATIENT)
Dept: FAMILY MEDICINE | Facility: CLINIC | Age: 37
End: 2017-09-25

## 2017-09-25 NOTE — TELEPHONE ENCOUNTER
Panel Management Review      Patient has the following on her problem list: None      Composite cancer screening  Chart review shows that this patient is due/due soon for the following Pap Smear  Summary:    Patient is due/failing the following:   PAP and PHYSICAL    Action needed:   Patient needs office visit for Pap and PX.    Type of outreach:    Sent [employer] message.    Questions for provider review:    None                                                                                                                                    Cordelia GRAYSON M.A.       Chart routed to Care Team .

## 2017-12-03 ENCOUNTER — HEALTH MAINTENANCE LETTER (OUTPATIENT)
Age: 37
End: 2017-12-03

## 2017-12-06 ENCOUNTER — OFFICE VISIT (OUTPATIENT)
Dept: ENDOCRINOLOGY | Facility: CLINIC | Age: 37
End: 2017-12-06
Payer: COMMERCIAL

## 2017-12-06 VITALS
RESPIRATION RATE: 16 BRPM | DIASTOLIC BLOOD PRESSURE: 82 MMHG | BODY MASS INDEX: 37.92 KG/M2 | WEIGHT: 240.3 LBS | SYSTOLIC BLOOD PRESSURE: 121 MMHG | TEMPERATURE: 97.9 F | HEART RATE: 63 BPM | OXYGEN SATURATION: 98 %

## 2017-12-06 DIAGNOSIS — E66.01 MORBID OBESITY DUE TO EXCESS CALORIES (H): ICD-10-CM

## 2017-12-06 PROCEDURE — 99214 OFFICE O/P EST MOD 30 MIN: CPT | Performed by: CLINICAL NURSE SPECIALIST

## 2017-12-06 RX ORDER — PHENTERMINE HYDROCHLORIDE 37.5 MG/1
37.5 TABLET ORAL
Qty: 32 TABLET | Refills: 2 | Status: SHIPPED | OUTPATIENT
Start: 2017-12-06 | End: 2018-05-02

## 2017-12-06 NOTE — NURSING NOTE
"Chief Complaint   Patient presents with     Weight Problem       Initial /82 (BP Location: Left arm, Patient Position: Chair, Cuff Size: Adult Large)  Pulse 63  Temp 97.9  F (36.6  C) (Oral)  Resp 16  Wt 240 lb 4.8 oz (109 kg)  LMP 11/25/2017 (Exact Date)  SpO2 98%  Breastfeeding? No  BMI 37.92 kg/m2 Estimated body mass index is 37.92 kg/(m^2) as calculated from the following:    Height as of 4/12/17: 5' 6.75\" (1.695 m).    Weight as of this encounter: 240 lb 4.8 oz (109 kg).  Medication Reconciliation: complete  "

## 2017-12-06 NOTE — MR AVS SNAPSHOT
"              After Visit Summary   12/6/2017    Jocelyn Weber    MRN: [id number]           Patient Information     Date Of Birth          1980        Visit Information        Provider Department      12/6/2017 1:30 PM Nancy Goodson APRN [facility]        Today's Diagnoses     Morbid obesity due to excess calories (H)          Care Instructions        You have lost a total of 25 lbs which is equal to a little over 10% of your initial weight.  Your waist measurement has decreased from 51\" to 44.25\" since starting.    Keep up the good work!    Follow up again in 3 months.    Nancy Goodson NP  Endocrinology            Follow-ups after your visit        Who to contact     If you have questions or need follow up information about today's clinic visit or your schedule please contact [facility] directly at [phone].  Normal or non-critical lab and imaging results will be communicated to you by Cloud[employer], letter or phone within 4 business days after the clinic has received the results. If you do not hear from us within 7 days, please contact the clinic through Cloud[employer]hart or phone. If you have a critical or abnormal lab result, we will notify you by phone as soon as possible.  Submit refill requests through [employer] or call your pharmacy and they will forward the refill request to us. Please allow 3 business days for your refill to be completed.          Additional Information About Your Visit        MyChart Information     [employer] gives you secure access to your electronic health record. If you see a primary care provider, you can also send messages to your care team and make appointments. If you have questions, please call your primary care clinic.  If you do not have a primary care provider, please call [phone] and they will assist you.        Care EveryWhere ID     This is your Care EveryWhere ID. This could be used by other organizations to access your [place] " medical records  [id number]        Your Vitals Were     Pulse Temperature Respirations Last Period Pulse Oximetry Breastfeeding?    63 97.9  F (36.6  C) (Oral) 16 11/25/2017 (Exact Date) 98% No    BMI (Body Mass Index)                   37.92 kg/m2            Blood Pressure from Last 3 Encounters:   12/06/17 121/82   07/26/17 112/72   04/12/17 118/76    Weight from Last 3 Encounters:   12/06/17 240 lb 4.8 oz (109 kg)   07/26/17 240 lb (108.9 kg)   04/12/17 249 lb 6.4 oz (113.1 kg)              Today, you had the following     No orders found for display         Where to get your medicines      Some of these will need a paper prescription and others can be bought over the counter.  Ask your nurse if you have questions.     Bring a paper prescription for each of these medications     phentermine 37.5 MG tablet          Primary Care Provider Office Phone # Fax #    Carri ALO Sigala [facility] [phone] [address] [facility] [address]        Equal Access to Services     [facility]: Hadii aad ku hadasho Soomaali, waaxda luqadaha, qaybta kaalmada adegenoveva, yenifer bruno . So [facility] [phone].    ATENCIÓN: Si habla español, tiene a maldonado disposición servicios gratuitos de asistencia lingüística. Aidee al [phone].    We comply with applicable federal civil rights laws and [place] laws. We do not discriminate on the basis of race, color, national origin, age, disability, sex, sexual orientation, or gender identity.            Thank you!     Thank you for choosing [facility]  for your care. Our goal is always to provide you with excellent care. Hearing back from our patients is one way we can continue to improve our services. Please take a few minutes to complete the written survey that you may receive in the mail after your visit with us. Thank you!             Your Updated Medication List - Protect others around you: Learn how to safely  use, store and throw away your medicines at www.disposemymeds.org.          This list is accurate as of: 12/6/17  2:03 PM.  Always use your most recent med list.                   Brand Name Dispense Instructions for use Diagnosis    phentermine 37.5 MG tablet    ADIPEX-P    32 tablet    Take 1 tablet (37.5 mg) by mouth every morning (before breakfast)    Morbid obesity due to excess calories (H)

## 2017-12-06 NOTE — PATIENT INSTRUCTIONS
"    You have lost a total of 25 lbs which is equal to a little over 10% of your initial weight.  Your waist measurement has decreased from 51\" to 44.25\" since starting.    Keep up the good work!    Follow up again in 3 months.    Nancy Goodson NP  Endocrinology    "

## 2017-12-06 NOTE — PROGRESS NOTES
Name: Jocelyn Weber (Last seen 2017)  F/u For   Chief Complaint   Patient presents with     Weight Problem     HPI:  Jocelyn Weber is a 37 year old female who presents for the evaluation/management of steady weight gain over the years and inability to lose weight with effort.  She is concerned it may be due to thyroid disease.      The only time she was able to lose weight was in  while taking phentermine, was able to lose about 40 lbs.  FH is + for mother with hypothyroidism.  Recent labs were unremarkable, thyroid antibodies were not elevated, TSH was < 3 with normal T3 and T4 levels.    She has two children, ages 5 and 18 months.    She was interested in starting weight loss medication.  Phentermine was started 3/9/2017.  Some occasional insomnia, + dry mouth, + occasional constipation.  Nothing significant.    Continues to tolerate phentermine well and it is working well to suppress her appetite.    PMH/PSH:  Past Medical History:   Diagnosis Date     Obesity      Past Surgical History:   Procedure Laterality Date     BLOOD PATCH N/A 2015    Procedure: EPIDURAL BLOOD PATCH;  Surgeon: GENERIC ANESTHESIA PROVIDER;  [facility]: [facility]     BUNIONECTOMY  96      SECTION  3/12      SECTION N/A 2015    Procedure:  SECTION;  Surgeon: Becky Aceves MD;  [facility] L+D     HC TOOTH EXTRACTION W/FORCEP       TONSILLECTOMY       Family Hx:  Family History   Problem Relation Age of Onset     Family History Negative Mother      Family History Negative Father      HEART DISEASE Maternal Grandmother 65     enlarged heart     HEART DISEASE Maternal Grandfather      CEREBROVASCULAR DISEASE Maternal Grandfather      HEART DISEASE Paternal Grandmother 40     HEART DISEASE Paternal Grandfather      CEREBROVASCULAR DISEASE Paternal Grandfather 40     Thyroid disease: Yes: mother.         DM2: Yes: MGF         Autoimmune: DM1, SLE, RA, Vitiligo No    Social Hx:          MEDICATIONS:  has a current medication list which includes the following prescription(s): phentermine, and the following Facility-Administered Medications: bupivacaine 0.25 % - epinephrine 1:200,000 and ketorolac.    ROS     ROS: 10 point ROS neg other than the symptoms noted above in the HPI.    GENERAL: + intentional weight loss.  No fevers, chills, malaise, or night sweats.   HEENT: no dysphagia, odynophagia, diplopia, neck pain or tenderness  CV: no chest pain, pressure, palpitations, skipped beats, LOC  LUNGS: no SOB, PERDUE, cough, sputum production, wheezing   ABDOMEN: no diarrhea or abdominal pain, + occasional constipation  EXTREMITIES: no rashes, ulcers, edema  NEUROLOGY: no changes in vision, tingling or numbness in hands or feet.   MSK: no muscle aches or pains, weakness  SKIN: no rashes or lesions  ENDOCRINE: no heat or cold intolerance    Physical Exam   VS: /82 (BP Location: Left arm, Patient Position: Chair, Cuff Size: Adult Large)  Pulse 63  Temp 97.9  F (36.6  C) (Oral)  Resp 16  Wt 109 kg (240 lb 4.8 oz)  LMP 11/25/2017 (Exact Date)  SpO2 98%  Breastfeeding? No  BMI 37.92 kg/m2  GENERAL: NAD, well dressed, answering questions appropriately, appears stated age.  HEENT: OP clear, no exopthalmous, no proptosis, EOMI, no lig lag, no retraction, no scleral icterus  RESPIRATORY: Clear. Normal respiratory effort.  CARDIOVASCULAR: RRR. No peripheral edema.  EXTREMITIES: no edema, +pulses, no rashes, no lesions  NEUROLOGY: CN grossly intact, no tremors  MSK: grossly intact, No digital cyanosis. Normal gait and station.  SKIN: no rashes, no lesions, no ulcers  PSYCH: Intact judgment and insight. A&OX3 with a cordial affect.    LABS:  Component    Latest Ref Rng & Units 1/31/2017   TSH    0.40 - 4.00 mU/L 2.13   T4 Free    0.76 - 1.46 ng/dL 1.02   Free T3    2.3 - 4.2 pg/mL 2.7   Thyroglobulin Antibody    <40 IU/mL <20   Thyroid Peroxidase Antibody    <35 IU/mL <10     !COMPREHENSIVE Latest Ref  "Rng & Units 1/31/2017   SODIUM 133 - 144 mmol/L 140   POTASSIUM 3.4 - 5.3 mmol/L 4.2   CHLORIDE 94 - 109 mmol/L 109   BUN 7 - 30 mg/dL 11   Creatinine 0.52 - 1.04 mg/dL 0.73   Glucose 70 - 99 mg/dL 93   ANION GAP 3 - 14 mmol/L 5   CALCIUM 8.5 - 10.1 mg/dL 8.8   ALBUMIN 3.4 - 5.0 g/dL 3.6     !LIPID/HEPATIC Latest Ref Rng & Units 4/9/2014 1/31/2017   CHOLESTEROL <200 mg/dL 231 (H) 161   TRIGLYCERIDES <150 mg/dL 167 (H) 100   HDL CHOLESTEROL >49 mg/dL 41 (L) 37 (L)   LDL CHOLESTEROL, CALCULATED <100 mg/dL 157 (H) 104 (H)   VLDL-CHOLESTEROL 0 - 30 mg/dL 33 (H)    NON HDL CHOLESTEROL <130 mg/dL  124   CHOLESTEROL/HDL RATIO 0.0 - 5.0 5.7 (H)    AST 0 - 45 U/L  31   ALT 0 - 50 U/L  59 (H)     Component    Latest Ref Rng & Units 2/5/2017   Saliva Collection Time     2300   Cortisol Saliva    ug/dL 0.057     Vital Signs 9/30/2015 11/18/2016 1/31/2017 4/12/2017   Weight (LB) 224 lb 265 lb 1 oz 258 lb 14.4 oz 249 lb 6.4 oz   Height  5' 7\" 5' 6.75\" 5' 6.75\"   BMI  41.6 40.94 39.35     Vital Signs 7/26/2017 12/6/2017   Weight (LB) 240 lb 240 lb 4.8 oz   Height     BMI (Calculated)  37.92     Waist Circumference: 51\" (3/9/17).  Waist circumference 46.75\" (4/12/2017).  47.75\" (7/26/2017).  44.25\" (today)    All pertinent notes, labs, and images personally reviewed by me.     A/P  Ms.Jonelle Weber is a 37 year old here for the evaluation of:    1. Morbid obesity due to excess calories (H)      I informed the pt that:  1.Weight loss medications can be taken to assist with weight reduction when combined with appropriate healthy lifestyle changes.  2.I discussed possible s/e, risks and benefits of weight loss medications.  3.All medications are FDA approved, however, some may be used ''off label'' for their weight loss benefits and some ''short term'' medications can be used for longer periods to achieve desired clinical outcomes.  4.All patients taking weight loss medications must be seen in clinic for refill " authorization.    Counseling exercise ( V65.41) - trying to exercise daily - now has more time, quit her second job  Dietary counseling( V65.3) - tracking intake using calorie tracking kendrick  Calculated BMI above the upper parameter and f/u plan was documented in the medical record.  Discussed indications, risks and benefits of all medications prescribed, and answered questions to patient's satisfaction.  Has lost 25 lbs since starting phentermine + diet and exercise efforts, 258-->249-->240 lbs.  BMI has decreased 40.94-->37.92.  Feels Phentermine is controlling her appetite adequately.  Continue Phentermine 37.5 mg qd.    Hyperlipidemia - Currently untreated.  Total and LDL cholesterol has improved compared with 2014.  I would expect some further improvement with diet and exercise efforts.  Will continue to monitor.    Labs ordered today:   No orders of the defined types were placed in this encounter.    Radiology/Consults ordered today: None    More than 50% of the time spent with Ms. Weber on counseling / coordinating her care.  Total face to face time was greater than or equal to 20 minutes.      Follow-up:  3 months    Nancy Goodson NP  Endocrinology  [facility]  CC: Carri Collins Ra

## 2018-02-20 ENCOUNTER — OFFICE VISIT (OUTPATIENT)
Dept: FAMILY MEDICINE | Facility: CLINIC | Age: 38
End: 2018-02-20
Payer: COMMERCIAL

## 2018-02-20 VITALS
SYSTOLIC BLOOD PRESSURE: 120 MMHG | BODY MASS INDEX: 37.71 KG/M2 | RESPIRATION RATE: 16 BRPM | HEART RATE: 74 BPM | WEIGHT: 239 LBS | OXYGEN SATURATION: 98 % | TEMPERATURE: 98.1 F | DIASTOLIC BLOOD PRESSURE: 78 MMHG

## 2018-02-20 DIAGNOSIS — J06.9 VIRAL URI WITH COUGH: Primary | ICD-10-CM

## 2018-02-20 PROCEDURE — 99213 OFFICE O/P EST LOW 20 MIN: CPT | Performed by: PHYSICIAN ASSISTANT

## 2018-02-20 RX ORDER — BENZONATATE 200 MG/1
200 CAPSULE ORAL 3 TIMES DAILY PRN
Qty: 21 CAPSULE | Refills: 0 | Status: SHIPPED | OUTPATIENT
Start: 2018-02-20 | End: 2018-04-09

## 2018-02-20 NOTE — MR AVS SNAPSHOT
After Visit Summary   2/20/2018    Jocelyn Weber    MRN: [id number]           Patient Information     Date Of Birth          1980        Visit Information        Provider Department      2/20/2018 1:00 PM Lele Perera PA-C [facility]        Today's Diagnoses     Viral URI with cough    -  1       Follow-ups after your visit        Who to contact     If you have questions or need follow up information about today's clinic visit or your schedule please contact [facility] directly at [phone].  Normal or non-critical lab and imaging results will be communicated to you by PanGo Networkshart, letter or phone within 4 business days after the clinic has received the results. If you do not hear from us within 7 days, please contact the clinic through [employer] or phone. If you have a critical or abnormal lab result, we will notify you by phone as soon as possible.  Submit refill requests through ChaoWIFI or call your pharmacy and they will forward the refill request to us. Please allow 3 business days for your refill to be completed.          Additional Information About Your Visit        MyChart Information     ChaoWIFI gives you secure access to your electronic health record. If you see a primary care provider, you can also send messages to your care team and make appointments. If you have questions, please call your primary care clinic.  If you do not have a primary care provider, please call [phone] and they will assist you.        Care EveryWhere ID     This is your Care EveryWhere ID. This could be used by other organizations to access your [facility] medical records  [id number]        Your Vitals Were     Pulse Temperature Respirations Pulse Oximetry BMI (Body Mass Index)       74 98.1  F (36.7  C) (Oral) 16 98% 37.71 kg/m2        Blood Pressure from Last 3 Encounters:   02/20/18 120/78   12/06/17 121/82   07/26/17 112/72    Weight from Last 3 Encounters:    02/20/18 239 lb (108.4 kg)   12/06/17 240 lb 4.8 oz (109 kg)   07/26/17 240 lb (108.9 kg)              Today, you had the following     No orders found for display         Today's Medication Changes          These changes are accurate as of 2/20/18  1:38 PM.  If you have any questions, ask your nurse or doctor.               Start taking these medicines.        Dose/Directions    benzonatate 200 MG capsule   Commonly known as:  TESSALON   Used for:  Viral URI with cough   Started by:  Lele Perera PA-C        Dose:  200 mg   Take 1 capsule (200 mg) by mouth 3 times daily as needed for cough   Quantity:  21 capsule   Refills:  0            Where to get your medicines      These medications were sent to [facility] [place] - [address]  [address] [address] [facility] [address]     Phone:  [phone]     benzonatate 200 MG capsule                Primary Care Provider Office Phone # Fax #    Carri Eugenio Collins, ALO [facility] [phone] [address] [address]  [facility] [address]        Equal Access to Services     [facility]: Hadii aad ku hadasho Soomaali, waaxda luqadaha, qaybta kaalmada adeegyada, yenifer christianin hayaan atnner bruno . So [facility] [phone].    ATENCIÓN: Si habla español, tiene a maldonado disposición servicios gratuitos de asistencia lingüística. [facility] [phone].    We comply with applicable federal civil rights laws and [place] laws. We do not discriminate on the basis of race, color, national origin, age, disability, sex, sexual orientation, or gender identity.            Thank you!     Thank you for choosing [facility]  for your care. Our goal is always to provide you with excellent care. Hearing back from our patients is one way we can continue to improve our services. Please take a few minutes to complete the written survey that you may receive in the mail after your visit with us. Thank you!             Your Updated  Medication List - Protect others around you: Learn how to safely use, store and throw away your medicines at www.disposemymeds.org.          This list is accurate as of 2/20/18  1:38 PM.  Always use your most recent med list.                   Brand Name Dispense Instructions for use Diagnosis    benzonatate 200 MG capsule    TESSALON    21 capsule    Take 1 capsule (200 mg) by mouth 3 times daily as needed for cough    Viral URI with cough       phentermine 37.5 MG tablet    ADIPEX-P    32 tablet    Take 1 tablet (37.5 mg) by mouth every morning (before breakfast)    Morbid obesity due to excess calories (H)

## 2018-02-20 NOTE — PROGRESS NOTES
SUBJECTIVE:   Jocelyn Weber is a 37 year old female who presents to clinic today for the following health issues:    Acute Illness   Acute illness concerns: flu like sxs  Onset: 5 days ago    Fever: YES    Chills/Sweats: YES    Headache (location?): YES    Sinus Pressure:YES    Conjunctivitis:  no    Ear Pain: no    Rhinorrhea: YES    Congestion: YES    Sore Throat: YES     Cough: YES    Wheeze: no    Decreased Appetite: YES    Nausea: no    Vomiting: no    Diarrhea:  no    Dysuria/Freq.: no    Fatigue/Achiness: YES    Sick/Strep Exposure: YES     Therapies Tried and outcome: mucinex, sudafed, tylenol cold, cough drops, dayquil - not getting better.    -Patient is a 36yo female with new onset upper respiratory illness beginning last Thursday  -began with painful cough, feeling warm/chills  -tried to travel over the weekend, felt fatigued  -slowly increased nasal congestion, ear fullness  -returned last night, tried to work but felt crummy  -did check temp Thursday, no fever; did not check since then  -denies any gross shortness of breath unless during cough  -some appetite suppression  -no vomiting or diarrhea  -did not get flu shot this year  -has tried some nyquil, helpful for sleep    Problem list and histories reviewed & adjusted, as indicated.  Additional history: as documented    Patient Active Problem List   Diagnosis     CARDIOVASCULAR SCREENING; LDL GOAL LESS THAN 160     Obesity     Hyperlipidemia LDL goal <130     Need for Tdap vaccination     anti-E alloimmunization     History of  delivery, currently pregnant     Positive testing for group B Streptococcus     Status post  section     BMI 37.0-37.9, adult     Past Surgical History:   Procedure Laterality Date     BLOOD PATCH N/A 2015    Procedure: EPIDURAL BLOOD PATCH;  Surgeon: GENERIC ANESTHESIA PROVIDER;  [facility]     BUNIONECTOMY  96      SECTION  3/12      SECTION N/A 2015    Procedure:   SECTION;  Surgeon: Becky Aceves MD;  [facility] L[facility]      TOOTH EXTRACTION W/FORCEP       TONSILLECTOMY         Social History   Substance Use Topics     Smoking status: Former Smoker     Smokeless tobacco: Never Used      Comment: passive smoker in her 20's     Alcohol use No     Family History   Problem Relation Age of Onset     Family History Negative Mother      Family History Negative Father      HEART DISEASE Maternal Grandmother 65     enlarged heart     HEART DISEASE Maternal Grandfather      CEREBROVASCULAR DISEASE Maternal Grandfather      HEART DISEASE Paternal Grandmother 40     HEART DISEASE Paternal Grandfather      CEREBROVASCULAR DISEASE Paternal Grandfather 40           Reviewed and updated as needed this visit by clinical staff       Reviewed and updated as needed this visit by Provider         ROS:  Constitutional, HEENT, cardiovascular, pulmonary, gi and gu systems are negative, except as otherwise noted.    OBJECTIVE:     /78 (BP Location: Right arm, Patient Position: Chair, Cuff Size: Adult Regular)  Pulse 74  Temp 98.1  F (36.7  C) (Oral)  Resp 16  Wt 239 lb (108.4 kg)  SpO2 98%  BMI 37.71 kg/m2  Body mass index is 37.71 kg/(m^2).  GENERAL: healthy, alert and no distress  EYES: Eyes grossly normal to inspection, PERRL and conjunctivae and sclerae normal  HENT: normal cephalic/atraumatic, right ear: mild clear effusion, left ear: normal: no effusions, no erythema, normal landmarks, nasal mucosa edematous , oropharynx clear, oral mucous membranes moist and sinuses: not tender  NECK: no adenopathy  RESP: lungs clear to auscultation - no rales, rhonchi or wheezes  CV: regular rates and rhythm, normal S1 S2, no S3 or S4 and no murmur, click or rub  MS: no peripheral edema    Diagnostic Test Results:  none     ASSESSMENT/PLAN:   1. Viral URI with cough  Daughter was negative for flu today making her symptoms less likely. No red flags. Continue supportive  cares at this point. If not improving towards the end of the week/early next week consider follow up exam. Push fluids, rest. She does not need a work note today but is welcome to call for missed day today & tomorrow.  - benzonatate (TESSALON) 200 MG capsule; Take 1 capsule (200 mg) by mouth 3 times daily as needed for cough  Dispense: 21 capsule; Refill: 0    -REMINDED DUE FOR PHYSICAL AND PAP    Lele Perera PA-C  [facility]

## 2018-03-16 ENCOUNTER — TELEPHONE (OUTPATIENT)
Dept: FAMILY MEDICINE | Facility: CLINIC | Age: 38
End: 2018-03-16

## 2018-03-16 NOTE — TELEPHONE ENCOUNTER
Panel Management Review      Patient has the following on her problem list: None      Composite cancer screening  Chart review shows that this patient is due/due soon for the following Pap Smear  Summary:    Patient is due/failing the following:   PAP and PHYSICAL    Action needed:   Patient needs office visit for physical and pap.    Type of outreach:    Discussed at OV on 02/20/18, patient knows that she is due.     Questions for provider review:    None                                                                                                                                    Keila Flor MA      Chart Closed.

## 2018-04-09 ENCOUNTER — OFFICE VISIT (OUTPATIENT)
Dept: URGENT CARE | Facility: URGENT CARE | Age: 38
End: 2018-04-09
Payer: COMMERCIAL

## 2018-04-09 VITALS
SYSTOLIC BLOOD PRESSURE: 132 MMHG | BODY MASS INDEX: 38.34 KG/M2 | WEIGHT: 243 LBS | DIASTOLIC BLOOD PRESSURE: 86 MMHG | TEMPERATURE: 97.9 F | RESPIRATION RATE: 16 BRPM | OXYGEN SATURATION: 100 % | HEART RATE: 66 BPM

## 2018-04-09 DIAGNOSIS — J01.90 ACUTE SINUSITIS WITH SYMPTOMS > 10 DAYS: Primary | ICD-10-CM

## 2018-04-09 PROCEDURE — 99213 OFFICE O/P EST LOW 20 MIN: CPT | Performed by: PHYSICIAN ASSISTANT

## 2018-04-09 NOTE — MR AVS SNAPSHOT
After Visit Summary   4/9/2018    Jocelyn Weber    MRN: [id number]           Patient Information     Date Of Birth          1980        Visit Information        Provider Department      4/9/2018 7:50 PM Shelly Pinon PA-C [facility]        Today's Diagnoses     Acute sinusitis with symptoms > 10 days    -  1       Follow-ups after your visit        Who to contact     If you have questions or need follow up information about today's clinic visit or your schedule please contact [facility] directly at [phone].  Normal or non-critical lab and imaging results will be communicated to you by [employer]hart, letter or phone within 4 business days after the clinic has received the results. If you do not hear from us within 7 days, please contact the clinic through Mavint or phone. If you have a critical or abnormal lab result, we will notify you by phone as soon as possible.  Submit refill requests through CopaCast or call your pharmacy and they will forward the refill request to us. Please allow 3 business days for your refill to be completed.          Additional Information About Your Visit        MyChart Information     C[employer] gives you secure access to your electronic health record. If you see a primary care provider, you can also send messages to your care team and make appointments. If you have questions, please call your primary care clinic.  If you do not have a primary care provider, please call [phone] and they will assist you.        Care EveryWhere ID     This is your Care EveryWhere ID. This could be used by other organizations to access your [facility] medical records  [id number]        Your Vitals Were     Pulse Temperature Respirations Pulse Oximetry BMI (Body Mass Index)       66 97.9  F (36.6  C) (Tympanic) 16 100% 38.34 kg/m2        Blood Pressure from Last 3 Encounters:   04/09/18 132/86   02/20/18 120/78   12/06/17 121/82    Weight from  Last 3 Encounters:   04/09/18 243 lb (110.2 kg)   02/20/18 239 lb (108.4 kg)   12/06/17 240 lb 4.8 oz (109 kg)              Today, you had the following     No orders found for display         Today's Medication Changes          These changes are accurate as of 4/9/18  8:21 PM.  If you have any questions, ask your nurse or doctor.               Start taking these medicines.        Dose/Directions    amoxicillin-clavulanate 875-125 MG per tablet   Commonly known as:  AUGMENTIN   Used for:  Acute sinusitis with symptoms > 10 days   Started by:  Shelly Pinon PA-C        Dose:  1 tablet   Take 1 tablet by mouth 2 times daily for 14 days   Quantity:  28 tablet   Refills:  0            Where to get your medicines      These medications were sent to [facility] [address] [place], [place] - [address] [place] [address] & [facility]  [address] [facility] [address]     Phone:  [phone]     amoxicillin-clavulanate 875-125 MG per tablet                Primary Care Provider Office Phone # Fax #    Carri Eugenio Karina, ALO [facility] [phone] [address]  [facility] [place] [address]        Equal Access to Services     [facility]: Hadii adina ku hadasho Soomaali, waaxda luqadaha, qaybta kaalmada adeegyada, waxay idiin haycristiann tanner quintero. So [facility] [phone].    ATENCIÓN: Si habla español, tiene a maldonado disposición servicios gratuitos de asistencia lingüística. Llame al [phone].    We comply with applicable federal civil rights laws and [place] laws. We do not discriminate on the basis of race, color, national origin, age, disability, sex, sexual orientation, or gender identity.            Thank you!     Thank you for choosing [facility]  for your care. Our goal is always to provide you with excellent care. Hearing back from our patients is one way we can continue to improve our services. Please take a few minutes to complete the written  survey that you may receive in the mail after your visit with us. Thank you!             Your Updated Medication List - Protect others around you: Learn how to safely use, store and throw away your medicines at www.disposemymeds.org.          This list is accurate as of 4/9/18  8:21 PM.  Always use your most recent med list.                   Brand Name Dispense Instructions for use Diagnosis    amoxicillin-clavulanate 875-125 MG per tablet    AUGMENTIN    28 tablet    Take 1 tablet by mouth 2 times daily for 14 days    Acute sinusitis with symptoms > 10 days       phentermine 37.5 MG tablet    ADIPEX-P    32 tablet    Take 1 tablet (37.5 mg) by mouth every morning (before breakfast)    Morbid obesity due to excess calories (H)

## 2018-04-10 NOTE — PROGRESS NOTES
SUBJECTIVE:  Jocelyn Weber is a 37 year old female here with concerns about sinus infection.  She states onset of symptoms were 2-3 month(s) ago.  Has had cold sx on and off for the past several months with cough. Currently had significant left sided facial pain and pressure with associated dental pain on the left.  Thick drainage also noted.  No cough at this time and denies SOB or chest pain  She has had maxillary pressure. Course of illness is worsening. Severity mild  Current and Associated symptoms: nasal congestion, rhinorrhea, facial pain/pressure, tooth pain, headache and post-nasal drainage  Predisposing factors include cold sx for months. Recent treatment has included: OTC med, Ibuprofen and tried to use Neti pot    Patient Active Problem List   Diagnosis     CARDIOVASCULAR SCREENING; LDL GOAL LESS THAN 160     Obesity     Hyperlipidemia LDL goal <130     Need for Tdap vaccination     anti-E alloimmunization     History of  delivery, currently pregnant     Positive testing for group B Streptococcus     Status post  section     BMI 37.0-37.9, adult         Past Medical History:   Diagnosis Date     Obesity      Social History   Substance Use Topics     Smoking status: Former Smoker     Smokeless tobacco: Never Used      Comment: passive smoker in her 20's     Alcohol use No       ROS:  Review of systems negative except as stated above.    OBJECTIVE:  /86 (BP Location: Right arm, Patient Position: Chair, Cuff Size: Adult Large)  Pulse 66  Temp 97.9  F (36.6  C) (Tympanic)  Resp 16  Wt 243 lb (110.2 kg)  SpO2 100%  BMI 38.34 kg/m2  Exam:GENERAL APPEARANCE: healthy, alert and no distress  EYES: EOMI,  PERRL, conjunctiva clear  HENT: TM's normal bilaterally, nasal turbinates erythematous, swollen, rhinorrhea yellow, thick and purulent, oral mucous membranes moist, no erythema noted and significant maxillary sinus tenderness on the left.  Thick PND Noted.    NECK: supple,  nontender, no lymphadenopathy  RESP: lungs clear to auscultation - no rales, rhonchi or wheezes  CV: regular rates and rhythm, normal S1 S2, no murmur noted  SKIN: no suspicious lesions or rashes    assessment/plan:  (J01.90) Acute sinusitis with symptoms > 10 days  (primary encounter diagnosis)  Comment:   Plan: amoxicillin-clavulanate (AUGMENTIN) 875-125 MG         per tablet         Med as directed and OTC med for sx relief. Continue with increased fluids, hot packs to face, steam and Augmentin as directed.  Fu with PCP as needed if sx worsen or new sx develop

## 2018-05-02 ENCOUNTER — OFFICE VISIT (OUTPATIENT)
Dept: ENDOCRINOLOGY | Facility: CLINIC | Age: 38
End: 2018-05-02
Payer: COMMERCIAL

## 2018-05-02 VITALS
SYSTOLIC BLOOD PRESSURE: 100 MMHG | WEIGHT: 235.6 LBS | TEMPERATURE: 97.8 F | DIASTOLIC BLOOD PRESSURE: 70 MMHG | OXYGEN SATURATION: 99 % | HEART RATE: 78 BPM | BODY MASS INDEX: 36.98 KG/M2 | HEIGHT: 67 IN

## 2018-05-02 DIAGNOSIS — E66.01 MORBID OBESITY DUE TO EXCESS CALORIES (H): ICD-10-CM

## 2018-05-02 PROCEDURE — 99213 OFFICE O/P EST LOW 20 MIN: CPT | Performed by: CLINICAL NURSE SPECIALIST

## 2018-05-02 RX ORDER — PHENTERMINE HYDROCHLORIDE 37.5 MG/1
37.5 TABLET ORAL
Qty: 32 TABLET | Refills: 2 | Status: SHIPPED | OUTPATIENT
Start: 2018-05-02 | End: 2018-08-31

## 2018-05-02 NOTE — MR AVS SNAPSHOT
"              After Visit Summary   5/2/2018    Jocelyn Weber    MRN: [id number]           Patient Information     Date Of Birth          1980        Visit Information        Provider Department      5/2/2018 9:00 AM Nancy Goodson APRN CNP [facility]        Today's Diagnoses     BMI 36.0-36.9,adult    -  1    Morbid obesity due to excess calories (H)           Follow-ups after your visit        Follow-up notes from your care team     Return in about 3 months (around 8/2/2018).      Who to contact     If you have questions or need follow up information about today's clinic visit or your schedule please contact [facility] directly at [phone].  Normal or non-critical lab and imaging results will be communicated to you by TranSiChart, letter or phone within 4 business days after the clinic has received the results. If you do not hear from us within 7 days, please contact the clinic through TranSiChart or phone. If you have a critical or abnormal lab result, we will notify you by phone as soon as possible.  Submit refill requests through LOOKCAST or call your pharmacy and they will forward the refill request to us. Please allow 3 business days for your refill to be completed.          Additional Information About Your Visit        MyChart Information     LOOKCAST gives you secure access to your electronic health record. If you see a primary care provider, you can also send messages to your care team and make appointments. If you have questions, please call your primary care clinic.  If you do not have a primary care provider, please call [phone] and they will assist you.        Care EveryWhere ID     This is your Care EveryWhere ID. This could be used by other organizations to access your [place] medical records  [id number]        Your Vitals Were     Pulse Temperature Height Last Period Pulse Oximetry Breastfeeding?    78 97.8  F (36.6  C) (Oral) 1.702 m (5' 7\") " 04/14/2018 (Approximate) 99% No    BMI (Body Mass Index)                   36.9 kg/m2            Blood Pressure from Last 3 Encounters:   05/02/18 100/70   04/09/18 132/86   02/20/18 120/78    Weight from Last 3 Encounters:   05/02/18 106.9 kg (235 lb 9.6 oz)   04/09/18 110.2 kg (243 lb)   02/20/18 108.4 kg (239 lb)              Today, you had the following     No orders found for display         Where to get your medicines      Some of these will need a paper prescription and others can be bought over the counter.  Ask your nurse if you have questions.     Bring a paper prescription for each of these medications     phentermine 37.5 MG tablet          Primary Care Provider Office Phone # Fax #    Carri Becker Karina, APRN [facility] [phone] [address]  [facility] [address]        Equal Access to Services     NAYELI QUARLES : Hadii aad orlando hadasho Sojobyali, waaxda luqadaha, qaybta kaalmada adeegyada, yenifer bruno . So [facility] [phone].    ATENCIÓN: Si habla español, tiene a maldonado disposición servicios gratuitos de asistencia lingüística. Llame al [phone].    We comply with applicable federal civil rights laws and [place] laws. We do not discriminate on the basis of race, color, national origin, age, disability, sex, sexual orientation, or gender identity.            Thank you!     Thank you for choosing [facility]  for your care. Our goal is always to provide you with excellent care. Hearing back from our patients is one way we can continue to improve our services. Please take a few minutes to complete the written survey that you may receive in the mail after your visit with us. Thank you!             Your Updated Medication List - Protect others around you: Learn how to safely use, store and throw away your medicines at www.disposemymeds.org.          This list is accurate as of 5/2/18 11:20 AM.  Always use your most recent med list.                    Brand Name Dispense Instructions for use Diagnosis    phentermine 37.5 MG tablet    ADIPEX-P    32 tablet    Take 1 tablet (37.5 mg) by mouth every morning (before breakfast)    Morbid obesity due to excess calories (H)

## 2018-05-02 NOTE — PROGRESS NOTES
Name: Jocelyn Weber (Last seen 2017)  F/u For   Chief Complaint   Patient presents with     Weight Problem     HPI:  Jocelyn Weber is a 37 year old female who presents for the evaluation/management of steady weight gain over the years and inability to lose weight with effort.  She is concerned it may be due to thyroid disease.      The only time she was able to lose weight was in  while taking phentermine, was able to lose about 40 lbs.  FH is + for mother with hypothyroidism.  Recent labs were unremarkable, thyroid antibodies were not elevated, TSH was < 3 with normal T3 and T4 levels.    She has two children, ages 5 and 18 months.    She was interested in starting weight loss medication.  Phentermine was started 3/9/2017.  Some occasional insomnia, + dry mouth, + occasional constipation.  Nothing significant.    Continues to tolerate phentermine well and it is working well to suppress her appetite.    PMH/PSH:  Past Medical History:   Diagnosis Date     Obesity      Past Surgical History:   Procedure Laterality Date     BLOOD PATCH N/A 2015    Procedure: EPIDURAL BLOOD PATCH;  Surgeon: GENERIC ANESTHESIA PROVIDER;  Location: [facility] OR     BUNIONECTOMY  96      SECTION  3/12      SECTION N/A 2015    Procedure:  SECTION;  Surgeon: Becky Aceves MD;  Location: [facility] L+D     HC TOOTH EXTRACTION W/FORCEP       TONSILLECTOMY       Family Hx:  Family History   Problem Relation Age of Onset     Family History Negative Mother      Family History Negative Father      HEART DISEASE Maternal Grandmother 65     enlarged heart     HEART DISEASE Maternal Grandfather      CEREBROVASCULAR DISEASE Maternal Grandfather      HEART DISEASE Paternal Grandmother 40     HEART DISEASE Paternal Grandfather      CEREBROVASCULAR DISEASE Paternal Grandfather 40     Thyroid disease: Yes: mother.         DM2: Yes: MGF         Autoimmune: DM1, SLE, RA, Vitiligo No    Social Hx:        "  MEDICATIONS:  has a current medication list which includes the following prescription(s): phentermine, and the following Facility-Administered Medications: bupivacaine 0.25 % - epinephrine 1:200,000 and ketorolac.    ROS     ROS: 10 point ROS neg other than the symptoms noted above in the HPI.    GENERAL: + intentional weight loss.  No fevers, chills, malaise, or night sweats.   HEENT: no dysphagia, odynophagia, diplopia, neck pain or tenderness  CV: no chest pain, pressure, palpitations, skipped beats, LOC  LUNGS: no SOB, PERDUE, cough, sputum production, wheezing   ABDOMEN: no diarrhea or abdominal pain, + occasional constipation  EXTREMITIES: no rashes, ulcers, edema  NEUROLOGY: no changes in vision, tingling or numbness in hands or feet.   MSK: no muscle aches or pains, weakness  SKIN: no rashes or lesions  ENDOCRINE: no heat or cold intolerance    Physical Exam   VS: /70 (BP Location: Left arm, Patient Position: Chair, Cuff Size: Adult Large)  Pulse 78  Temp 97.8  F (36.6  C) (Oral)  Ht 1.702 m (5' 7\")  Wt 106.9 kg (235 lb 9.6 oz)  LMP 04/14/2018 (Approximate)  SpO2 99%  Breastfeeding? No  BMI 36.9 kg/m2  GENERAL: NAD, well dressed, answering questions appropriately, appears stated age.  HEENT: OP clear, no exopthalmous, no proptosis, EOMI, no lig lag, no retraction, no scleral icterus  RESPIRATORY: Clear. Normal respiratory effort.  CARDIOVASCULAR: RRR. No peripheral edema.  EXTREMITIES: no edema, +pulses, no rashes, no lesions  NEUROLOGY: CN grossly intact, no tremors  MSK: grossly intact, No digital cyanosis. Normal gait and station.  SKIN: no rashes, no lesions, no ulcers  PSYCH: Intact judgment and insight. A&OX3 with a cordial affect.    LABS:  Component    Latest Ref Rng & Units 1/31/2017   TSH    0.40 - 4.00 mU/L 2.13   T4 Free    0.76 - 1.46 ng/dL 1.02   Free T3    2.3 - 4.2 pg/mL 2.7   Thyroglobulin Antibody    <40 IU/mL <20   Thyroid Peroxidase Antibody    <35 IU/mL <10 " "    !COMPREHENSIVE Latest Ref Rng & Units 1/31/2017   SODIUM 133 - 144 mmol/L 140   POTASSIUM 3.4 - 5.3 mmol/L 4.2   CHLORIDE 94 - 109 mmol/L 109   BUN 7 - 30 mg/dL 11   Creatinine 0.52 - 1.04 mg/dL 0.73   Glucose 70 - 99 mg/dL 93   ANION GAP 3 - 14 mmol/L 5   CALCIUM 8.5 - 10.1 mg/dL 8.8   ALBUMIN 3.4 - 5.0 g/dL 3.6     !LIPID/HEPATIC Latest Ref Rng & Units 4/9/2014 1/31/2017   CHOLESTEROL <200 mg/dL 231 (H) 161   TRIGLYCERIDES <150 mg/dL 167 (H) 100   HDL CHOLESTEROL >49 mg/dL 41 (L) 37 (L)   LDL CHOLESTEROL, CALCULATED <100 mg/dL 157 (H) 104 (H)   VLDL-CHOLESTEROL 0 - 30 mg/dL 33 (H)    NON HDL CHOLESTEROL <130 mg/dL  124   CHOLESTEROL/HDL RATIO 0.0 - 5.0 5.7 (H)    AST 0 - 45 U/L  31   ALT 0 - 50 U/L  59 (H)     Component    Latest Ref Rng & Units 2/5/2017   Saliva Collection Time     2300   Cortisol Saliva    ug/dL 0.057     Vital Signs 9/30/2015 11/18/2016 1/31/2017 4/12/2017   Weight (LB) 224 lb 265 lb 1 oz 258 lb 14.4 oz 249 lb 6.4 oz   Height  5' 7\" 5' 6.75\" 5' 6.75\"   BMI  41.6 40.94 39.35     Vital Signs 12/6/2017 2/20/2018 4/9/2018 5/2/2018   Weight (LB) 240 lb 4.8 oz 239 lb 243 lb 235 lb 9.6 oz   Height    5' 7\"   BMI (Calculated)    36.98       Waist Circumference: 51\" (3/9/17).  Waist circumference 46.75\" (4/12/2017).  47.75\" (7/26/2017).  44.25\" (12/6/2017). 42.75\" (today)    All pertinent notes, labs, and images personally reviewed by me.     A/P  Ms.Jonelle Weber is a 37 year old here for the evaluation of:    No diagnosis found.  I informed the pt that:  1.Weight loss medications can be taken to assist with weight reduction when combined with appropriate healthy lifestyle changes.  2.I discussed possible s/e, risks and benefits of weight loss medications.  3.All medications are FDA approved, however, some may be used ''off label'' for their weight loss benefits and some ''short term'' medications can be used for longer periods to achieve desired clinical outcomes.  4.All patients taking " weight loss medications must be seen in clinic for refill authorization.    Counseling exercise ( V65.41) - trying to exercise daily - now has more time, quit her second job  Dietary counseling( V65.3) - tracking intake using calorie tracking kendrick  Calculated BMI above the upper parameter and f/u plan was documented in the medical record.  Discussed indications, risks and benefits of all medications prescribed, and answered questions to patient's satisfaction.  Has lost 30 lbs since starting phentermine + diet and exercise efforts, 265-->249-->240-->235 lbs.  BMI has decreased 40.94-->37.92-->36.98.  Feels Phentermine is controlling her appetite adequately.  Continue Phentermine 37.5 mg qd.    Hyperlipidemia - Currently untreated.  Total and LDL cholesterol has improved compared with 2014.  I would expect some further improvement with diet and exercise efforts.  Will continue to monitor.    Labs ordered today:   No orders of the defined types were placed in this encounter.    Radiology/Consults ordered today: None    More than 50% of the time spent with Ms. Weber on counseling / coordinating her care.  Total face to face time was greater than or equal to 15 minutes.      Follow-up:  3 months    Nancy Goodson NP  Endocrinology  [facility]  CC: Carri Collins Ra

## 2018-08-23 ENCOUNTER — TELEPHONE (OUTPATIENT)
Dept: FAMILY MEDICINE | Facility: CLINIC | Age: 38
End: 2018-08-23

## 2018-08-23 NOTE — TELEPHONE ENCOUNTER
Type of outreach:  Sent On[employer] message.  Health Maintenance Due   Topic Date Due     PAP Q3 YR  04/09/2017     Due for physical and pap.  Keila Flor MA

## 2018-08-31 ENCOUNTER — OFFICE VISIT (OUTPATIENT)
Dept: ENDOCRINOLOGY | Facility: CLINIC | Age: 38
End: 2018-08-31
Payer: COMMERCIAL

## 2018-08-31 VITALS
HEART RATE: 60 BPM | HEIGHT: 67 IN | WEIGHT: 236 LBS | SYSTOLIC BLOOD PRESSURE: 104 MMHG | DIASTOLIC BLOOD PRESSURE: 80 MMHG | BODY MASS INDEX: 37.04 KG/M2

## 2018-08-31 DIAGNOSIS — E66.01 MORBID OBESITY DUE TO EXCESS CALORIES (H): ICD-10-CM

## 2018-08-31 DIAGNOSIS — E78.5 HYPERLIPIDEMIA LDL GOAL <130: ICD-10-CM

## 2018-08-31 PROCEDURE — 99213 OFFICE O/P EST LOW 20 MIN: CPT | Performed by: CLINICAL NURSE SPECIALIST

## 2018-08-31 RX ORDER — TOPIRAMATE 25 MG/1
25 TABLET, FILM COATED ORAL 2 TIMES DAILY
Qty: 60 TABLET | Refills: 3 | Status: SHIPPED | OUTPATIENT
Start: 2018-08-31 | End: 2018-12-07

## 2018-08-31 RX ORDER — PHENTERMINE HYDROCHLORIDE 37.5 MG/1
37.5 TABLET ORAL
Qty: 32 TABLET | Refills: 2 | Status: SHIPPED | OUTPATIENT
Start: 2018-08-31 | End: 2018-12-07

## 2018-08-31 NOTE — MR AVS SNAPSHOT
After Visit Summary   8/31/2018    Jocelyn Weber    MRN: [id number]           Patient Information     Date Of Birth          1980        Visit Information        Provider Department      8/31/2018 10:00 AM Nancy Goodson APRN CNP [facility]        Today's Diagnoses     BMI 36.0-36.9,adult    -  1    Morbid obesity due to excess calories (H)        Hyperlipidemia LDL goal <130           Follow-ups after your visit        Your next 10 appointments already scheduled     Dec 07, 2018  9:00 AM CST   Return Visit with ALO Lei CNP   [facility] ([facility])    [address] [facility] [address] [phone]              Who to contact     If you have questions or need follow up information about today's clinic visit or your schedule please contact [facility] directly at [phone].  Normal or non-critical lab and imaging results will be communicated to you by MyChart, letter or phone within 4 business days after the clinic has received the results. If you do not hear from us within 7 days, please contact the clinic through [email or web address] or phone. If you have a critical or abnormal lab result, we will notify you by phone as soon as possible.  Submit refill requests through MedyMatch or call your pharmacy and they will forward the refill request to us. Please allow 3 business days for your refill to be completed.          Additional Information About Your Visit        MyChart Information     MedyMatch gives you secure access to your electronic health record. If you see a primary care provider, you can also send messages to your care team and make appointments. If you have questions, please call your primary care clinic.  If you do not have a primary care provider, please call [phone] and they will assist you.        Care EveryWhere ID     This is your Care EveryWhere ID. This could be used by  "other organizations to access your [facility] medical records  [id number]        Your Vitals Were     Pulse Height BMI (Body Mass Index)             60 1.702 m (5' 7\") 36.96 kg/m2          Blood Pressure from Last 3 Encounters:   08/31/18 104/80   05/02/18 100/70   04/09/18 132/86    Weight from Last 3 Encounters:   08/31/18 107 kg (236 lb)   05/02/18 106.9 kg (235 lb 9.6 oz)   04/09/18 110.2 kg (243 lb)              Today, you had the following     No orders found for display         Today's Medication Changes          These changes are accurate as of 8/31/18 10:54 AM.  If you have any questions, ask your nurse or doctor.               Start taking these medicines.        Dose/Directions    topiramate 25 MG tablet   Commonly known as:  TOPAMAX   Used for:  Morbid obesity due to excess calories (H), BMI 36.0-36.9,adult, Hyperlipidemia LDL goal <130   Started by:  Nancy Goodson APRN CNP        Dose:  25 mg   Take 1 tablet (25 mg) by mouth 2 times daily   Quantity:  60 tablet   Refills:  3            Where to get your medicines      These medications were sent to [facility] [id number] [place] [address]  [address] [facility] [address]     Phone:  [phone]     topiramate 25 MG tablet         Some of these will need a paper prescription and others can be bought over the counter.  Ask your nurse if you have questions.     Bring a paper prescription for each of these medications     phentermine 37.5 MG tablet                Primary Care Provider Office Phone # Fax #    ALO Stevens Ra [phone]       [address] [facility] [address]        Equal Access to Services     NAYELI QUARLES AH: Hadii adina walterso Soalyse, waaxda luqadaha, qaybta kaalmada adeegyada, yenifer castn tanner quintero. So [facility] [phone].    ATENCIÓN: Si habla español, tiene a maldonado disposición servicios gratuitos de asistencia lingüística. Llame al " [phone].    We comply with applicable federal civil rights laws and [place] laws. We do not discriminate on the basis of race, color, national origin, age, disability, sex, sexual orientation, or gender identity.            Thank you!     Thank you for choosing [facility]  for your care. Our goal is always to provide you with excellent care. Hearing back from our patients is one way we can continue to improve our services. Please take a few minutes to complete the written survey that you may receive in the mail after your visit with us. Thank you!             Your Updated Medication List - Protect others around you: Learn how to safely use, store and throw away your medicines at www.disposemymeds.org.          This list is accurate as of 8/31/18 10:54 AM.  Always use your most recent med list.                   Brand Name Dispense Instructions for use Diagnosis    phentermine 37.5 MG tablet    ADIPEX-P    32 tablet    Take 1 tablet (37.5 mg) by mouth every morning (before breakfast)    Morbid obesity due to excess calories (H)       topiramate 25 MG tablet    TOPAMAX    60 tablet    Take 1 tablet (25 mg) by mouth 2 times daily    Morbid obesity due to excess calories (H), BMI 36.0-36.9,adult, Hyperlipidemia LDL goal <130

## 2018-08-31 NOTE — PROGRESS NOTES
Name: Jocelyn Weber (Last seen 2018)  F/u For   Chief Complaint   Patient presents with     Endocrine Problem     3 month f/u weight loss     HPI:  Jocelyn Weber is a 38 year old female who presents for the evaluation/management of steady weight gain over the years and inability to lose weight with effort.  She is concerned it may be due to thyroid disease.      The only time she was able to lose weight was in  while taking phentermine, was able to lose about 40 lbs.  FH is + for mother with hypothyroidism.  Recent labs were unremarkable, thyroid antibodies were not elevated, TSH was < 3 with normal T3 and T4 levels.    She has two young children.    She was interested in starting weight loss medication.  Phentermine was started 3/9/2017.  Some occasional insomnia, + dry mouth, + occasional constipation.  Nothing significant.    Continues to tolerate phentermine well and it is working well to suppress her appetite.    PMH/PSH:  Past Medical History:   Diagnosis Date     Obesity      Past Surgical History:   Procedure Laterality Date     BLOOD PATCH N/A 2015    Procedure: EPIDURAL BLOOD PATCH;  Surgeon: GENERIC ANESTHESIA PROVIDER;  [facility]: [facility]     BUNIONECTOMY  96      SECTION  3/12      SECTION N/A 2015    Procedure:  SECTION;  Surgeon: Becky Aceves MD;  Location: [facility] L+D     HC TOOTH EXTRACTION W/FORCEP       TONSILLECTOMY       Family Hx:  Family History   Problem Relation Age of Onset     Family History Negative Mother      Family History Negative Father      HEART DISEASE Maternal Grandmother 65     enlarged heart     HEART DISEASE Maternal Grandfather      Cerebrovascular Disease Maternal Grandfather      HEART DISEASE Paternal Grandmother 40     HEART DISEASE Paternal Grandfather      Cerebrovascular Disease Paternal Grandfather 40     Thyroid disease: Yes: mother.         DM2: Yes: MGF         Autoimmune: DM1, SLE, RA, Vitiligo  "No    Social Hx:         MEDICATIONS:  has a current medication list which includes the following prescription(s): phentermine and topiramate, and the following Facility-Administered Medications: bupivacaine 0.25 % - epinephrine 1:200,000 and ketorolac.    ROS     ROS: 10 point ROS neg other than the symptoms noted above in the HPI.    GENERAL: + intentional weight loss.  No fevers, chills, malaise, or night sweats.   HEENT: no dysphagia, odynophagia, diplopia, neck pain or tenderness  CV: no chest pain, pressure, palpitations, skipped beats, LOC  LUNGS: no SOB, PERDUE, cough, sputum production, wheezing   ABDOMEN: no diarrhea or abdominal pain, + occasional constipation  EXTREMITIES: no rashes, ulcers, edema  NEUROLOGY: no changes in vision, tingling or numbness in hands or feet.   MSK: no muscle aches or pains, weakness  SKIN: no rashes or lesions  ENDOCRINE: no heat or cold intolerance    Physical Exam   VS: /80  Pulse 60  Ht 1.702 m (5' 7\")  Wt 107 kg (236 lb)  BMI 36.96 kg/m2  GENERAL: NAD, well dressed, answering questions appropriately, appears stated age.  HEENT: no exopthalmous, no proptosis, no lig lag, no retraction, no scleral icterus  RESPIRATORY: Clear. Normal respiratory effort.  CARDIOVASCULAR: RRR. No peripheral edema.  EXTREMITIES: no edema, +pulses, no rashes, no lesions  NEUROLOGY: CN grossly intact, no tremors  MSK: grossly intact, No digital cyanosis. Normal gait and station.  SKIN: no rashes, no lesions, no ulcers  PSYCH: Intact judgment and insight. A&OX3 with a cordial affect.    LABS:  Component    Latest Ref Rng & Units 1/31/2017   TSH    0.40 - 4.00 mU/L 2.13   T4 Free    0.76 - 1.46 ng/dL 1.02   Free T3    2.3 - 4.2 pg/mL 2.7   Thyroglobulin Antibody    <40 IU/mL <20   Thyroid Peroxidase Antibody    <35 IU/mL <10     !COMPREHENSIVE Latest Ref Rng & Units 1/31/2017   SODIUM 133 - 144 mmol/L 140   POTASSIUM 3.4 - 5.3 mmol/L 4.2   CHLORIDE 94 - 109 mmol/L 109   BUN 7 - 30 mg/dL 11 " "  Creatinine 0.52 - 1.04 mg/dL 0.73   Glucose 70 - 99 mg/dL 93   ANION GAP 3 - 14 mmol/L 5   CALCIUM 8.5 - 10.1 mg/dL 8.8   ALBUMIN 3.4 - 5.0 g/dL 3.6     !LIPID/HEPATIC Latest Ref Rng & Units 4/9/2014 1/31/2017   CHOLESTEROL <200 mg/dL 231 (H) 161   TRIGLYCERIDES <150 mg/dL 167 (H) 100   HDL CHOLESTEROL >49 mg/dL 41 (L) 37 (L)   LDL CHOLESTEROL, CALCULATED <100 mg/dL 157 (H) 104 (H)   VLDL-CHOLESTEROL 0 - 30 mg/dL 33 (H)    NON HDL CHOLESTEROL <130 mg/dL  124   CHOLESTEROL/HDL RATIO 0.0 - 5.0 5.7 (H)    AST 0 - 45 U/L  31   ALT 0 - 50 U/L  59 (H)     Component    Latest Ref Rng & Units 2/5/2017   Saliva Collection Time     2300   Cortisol Saliva    ug/dL 0.057     Vital Signs 9/30/2015 11/18/2016 1/31/2017 4/12/2017   Weight (LB) 224 lb 265 lb 1 oz 258 lb 14.4 oz 249 lb 6.4 oz   Height  5' 7\" 5' 6.75\" 5' 6.75\"   BMI  41.6 40.94 39.35     Vital Signs 12/6/2017 2/20/2018 4/9/2018 5/2/2018   Weight (LB) 240 lb 4.8 oz 239 lb 243 lb 235 lb 9.6 oz   Height    5' 7\"   BMI (Calculated)    36.98     Vital Signs 8/31/2018   Weight (LB) 236 lb   Height 5' 7\"   BMI (Calculated) 37.04       Waist Circumference: 51\" (3/9/17).  Waist circumference 46.75\" (4/12/2017).  47.75\" (7/26/2017).  44.25\" (12/6/2017). 42.75\" (5/2018)    All pertinent notes, labs, and images personally reviewed by me.     A/P  Ms.Jonelle Weber is a 38 year old here for the evaluation of:    1. BMI 36.0-36.9,adult    2. Morbid obesity due to excess calories (H)    3. Hyperlipidemia LDL goal <130      I informed the pt that:  1.Weight loss medications can be taken to assist with weight reduction when combined with appropriate healthy lifestyle changes.  2.I discussed possible s/e, risks and benefits of weight loss medications.  3.All medications are FDA approved, however, some may be used ''off label'' for their weight loss benefits and some ''short term'' medications can be used for longer periods to achieve desired clinical outcomes.  4.All patients " taking weight loss medications must be seen in clinic for refill authorization.    Counseling exercise ( V65.41) - trying to exercise daily - now has more time, quit her second job  Dietary counseling( V65.3) - tracking intake using calorie tracking kendrick  Calculated BMI above the upper parameter and f/u plan was documented in the medical record.  Discussed indications, risks and benefits of all medications prescribed, and answered questions to patient's satisfaction.  Has lost 30 lbs since starting phentermine + diet and exercise efforts, 265-->249-->240-->235 lbs.  BMI has decreased 40.94-->37.92-->36.98.  Weight stable since last seen, 235-->236 lbs  Feels Phentermine is controlling her appetite adequately.  Continue Phentermine 37.5 mg every day + start topiramate 25 mg bid.    Hyperlipidemia - Currently untreated.  Total and LDL cholesterol has improved compared with 2014.  I would expect some further improvement with diet and exercise efforts.  Will continue to monitor.    Labs ordered today:   No orders of the defined types were placed in this encounter.    Radiology/Consults ordered today: None    More than 50% of the time spent with Ms. Weber on counseling / coordinating her care.  Total face to face time was greater than or equal to 15 minutes.      Follow-up:  3 months    Nancy Goodson NP  Endocrinology  [facility]  CC: Carri Collins Ra

## 2018-08-31 NOTE — LETTER
2018         RE: Jocelyn Weber  [address] [facility] [address]        Dear Colleague,    Thank you for referring your patient, Jocelyn Weber, to the [facility]. Please see a copy of my visit note below.    Name: Jocelyn Weber (Last seen 2018)  F/u For   Chief Complaint   Patient presents with     Endocrine Problem     3 month f/u weight loss     HPI:  Jocelyn Weber is a 38 year old female who presents for the evaluation/management of steady weight gain over the years and inability to lose weight with effort.  She is concerned it may be due to thyroid disease.      The only time she was able to lose weight was in  while taking phentermine, was able to lose about 40 lbs.  FH is + for mother with hypothyroidism.  Recent labs were unremarkable, thyroid antibodies were not elevated, TSH was < 3 with normal T3 and T4 levels.    She has two young children.    She was interested in starting weight loss medication.  Phentermine was started 3/9/2017.  Some occasional insomnia, + dry mouth, + occasional constipation.  Nothing significant.    Continues to tolerate phentermine well and it is working well to suppress her appetite.    PMH/PSH:  Past Medical History:   Diagnosis Date     Obesity      Past Surgical History:   Procedure Laterality Date     BLOOD PATCH N/A 2015    Procedure: EPIDURAL BLOOD PATCH;  Surgeon: GENERIC ANESTHESIA PROVIDER;  [facility]     BUNIONECTOMY  96      SECTION  3/12      SECTION N/A 2015    Procedure:  SECTION;  Surgeon: Becky Aceves MD;  [facility] L[facility]     HC TOOTH EXTRACTION W/FORCEP       TONSILLECTOMY       Family Hx:  Family History   Problem Relation Age of Onset     Family History Negative Mother      Family History Negative Father      HEART DISEASE Maternal Grandmother 65     enlarged heart     HEART DISEASE Maternal Grandfather      Cerebrovascular Disease Maternal  "Grandfather      HEART DISEASE Paternal Grandmother 40     HEART DISEASE Paternal Grandfather      Cerebrovascular Disease Paternal Grandfather 40     Thyroid disease: Yes: mother.         DM2: Yes: MGF         Autoimmune: DM1, SLE, RA, Vitiligo No    Social Hx:         MEDICATIONS:  has a current medication list which includes the following prescription(s): phentermine and topiramate, and the following Facility-Administered Medications: bupivacaine 0.25 % - epinephrine 1:200,000 and ketorolac.    ROS     ROS: 10 point ROS neg other than the symptoms noted above in the HPI.    GENERAL: + intentional weight loss.  No fevers, chills, malaise, or night sweats.   HEENT: no dysphagia, odynophagia, diplopia, neck pain or tenderness  CV: no chest pain, pressure, palpitations, skipped beats, LOC  LUNGS: no SOB, PERDUE, cough, sputum production, wheezing   ABDOMEN: no diarrhea or abdominal pain, + occasional constipation  EXTREMITIES: no rashes, ulcers, edema  NEUROLOGY: no changes in vision, tingling or numbness in hands or feet.   MSK: no muscle aches or pains, weakness  SKIN: no rashes or lesions  ENDOCRINE: no heat or cold intolerance    Physical Exam   VS: /80  Pulse 60  Ht 1.702 m (5' 7\")  Wt 107 kg (236 lb)  BMI 36.96 kg/m2  GENERAL: NAD, well dressed, answering questions appropriately, appears stated age.  HEENT: no exopthalmous, no proptosis, no lig lag, no retraction, no scleral icterus  RESPIRATORY: Clear. Normal respiratory effort.  CARDIOVASCULAR: RRR. No peripheral edema.  EXTREMITIES: no edema, +pulses, no rashes, no lesions  NEUROLOGY: CN grossly intact, no tremors  MSK: grossly intact, No digital cyanosis. Normal gait and station.  SKIN: no rashes, no lesions, no ulcers  PSYCH: Intact judgment and insight. A&OX3 with a cordial affect.    LABS:  Component    Latest Ref Rng & Units 1/31/2017   TSH    0.40 - 4.00 mU/L 2.13   T4 Free    0.76 - 1.46 ng/dL 1.02   Free T3    2.3 - 4.2 pg/mL 2.7 " "  Thyroglobulin Antibody    <40 IU/mL <20   Thyroid Peroxidase Antibody    <35 IU/mL <10     !COMPREHENSIVE Latest Ref Rng & Units 1/31/2017   SODIUM 133 - 144 mmol/L 140   POTASSIUM 3.4 - 5.3 mmol/L 4.2   CHLORIDE 94 - 109 mmol/L 109   BUN 7 - 30 mg/dL 11   Creatinine 0.52 - 1.04 mg/dL 0.73   Glucose 70 - 99 mg/dL 93   ANION GAP 3 - 14 mmol/L 5   CALCIUM 8.5 - 10.1 mg/dL 8.8   ALBUMIN 3.4 - 5.0 g/dL 3.6     !LIPID/HEPATIC Latest Ref Rng & Units 4/9/2014 1/31/2017   CHOLESTEROL <200 mg/dL 231 (H) 161   TRIGLYCERIDES <150 mg/dL 167 (H) 100   HDL CHOLESTEROL >49 mg/dL 41 (L) 37 (L)   LDL CHOLESTEROL, CALCULATED <100 mg/dL 157 (H) 104 (H)   VLDL-CHOLESTEROL 0 - 30 mg/dL 33 (H)    NON HDL CHOLESTEROL <130 mg/dL  124   CHOLESTEROL/HDL RATIO 0.0 - 5.0 5.7 (H)    AST 0 - 45 U/L  31   ALT 0 - 50 U/L  59 (H)     Component    Latest Ref Rng & Units 2/5/2017   Saliva Collection Time     2300   Cortisol Saliva    ug/dL 0.057     Vital Signs 9/30/2015 11/18/2016 1/31/2017 4/12/2017   Weight (LB) 224 lb 265 lb 1 oz 258 lb 14.4 oz 249 lb 6.4 oz   Height  5' 7\" 5' 6.75\" 5' 6.75\"   BMI  41.6 40.94 39.35     Vital Signs 12/6/2017 2/20/2018 4/9/2018 5/2/2018   Weight (LB) 240 lb 4.8 oz 239 lb 243 lb 235 lb 9.6 oz   Height    5' 7\"   BMI (Calculated)    36.98     Vital Signs 8/31/2018   Weight (LB) 236 lb   Height 5' 7\"   BMI (Calculated) 37.04       Waist Circumference: 51\" (3/9/17).  Waist circumference 46.75\" (4/12/2017).  47.75\" (7/26/2017).  44.25\" (12/6/2017). 42.75\" (5/2018)    All pertinent notes, labs, and images personally reviewed by me.     A/P  Ms.Jonelle Weber is a 38 year old here for the evaluation of:    1. BMI 36.0-36.9,adult    2. Morbid obesity due to excess calories (H)    3. Hyperlipidemia LDL goal <130      I informed the pt that:  1.Weight loss medications can be taken to assist with weight reduction when combined with appropriate healthy lifestyle changes.  2.I discussed possible s/e, risks and benefits of " weight loss medications.  3.All medications are FDA approved, however, some may be used ''off label'' for their weight loss benefits and some ''short term'' medications can be used for longer periods to achieve desired clinical outcomes.  4.All patients taking weight loss medications must be seen in clinic for refill authorization.    Counseling exercise ( V65.41) - trying to exercise daily - now has more time, quit her second job  Dietary counseling( V65.3) - tracking intake using calorie tracking kendrick  Calculated BMI above the upper parameter and f/u plan was documented in the medical record.  Discussed indications, risks and benefits of all medications prescribed, and answered questions to patient's satisfaction.  Has lost 30 lbs since starting phentermine + diet and exercise efforts, 265-->249-->240-->235 lbs.  BMI has decreased 40.94-->37.92-->36.98.  Weight stable since last seen, 235-->236 lbs  Feels Phentermine is controlling her appetite adequately.  Continue Phentermine 37.5 mg every day + start topiramate 25 mg bid.    Hyperlipidemia - Currently untreated.  Total and LDL cholesterol has improved compared with 2014.  I would expect some further improvement with diet and exercise efforts.  Will continue to monitor.    Labs ordered today:   No orders of the defined types were placed in this encounter.    Radiology/Consults ordered today: None    More than 50% of the time spent with Ms. Weber on counseling / coordinating her care.  Total face to face time was greater than or equal to 15 minutes.      Follow-up:  3 months    Nancy Goodson NP  Endocrinology  [facility]  CC: Carri Collins Ra     Again, thank you for allowing me to participate in the care of your patient.        Sincerely,        ALO Lei CNP

## 2018-09-11 ENCOUNTER — OFFICE VISIT (OUTPATIENT)
Dept: OBGYN | Facility: CLINIC | Age: 38
End: 2018-09-11
Payer: COMMERCIAL

## 2018-09-11 VITALS
SYSTOLIC BLOOD PRESSURE: 124 MMHG | BODY MASS INDEX: 37.2 KG/M2 | WEIGHT: 237 LBS | HEIGHT: 67 IN | DIASTOLIC BLOOD PRESSURE: 74 MMHG

## 2018-09-11 DIAGNOSIS — Z11.51 SCREENING FOR HUMAN PAPILLOMAVIRUS: ICD-10-CM

## 2018-09-11 DIAGNOSIS — Z12.4 CERVICAL CANCER SCREENING: ICD-10-CM

## 2018-09-11 DIAGNOSIS — Z23 NEED FOR PROPHYLACTIC VACCINATION AND INOCULATION AGAINST INFLUENZA: ICD-10-CM

## 2018-09-11 DIAGNOSIS — Z00.00 ROUTINE GENERAL MEDICAL EXAMINATION AT A HEALTH CARE FACILITY: Primary | ICD-10-CM

## 2018-09-11 PROCEDURE — 87624 HPV HI-RISK TYP POOLED RSLT: CPT | Performed by: ADVANCED PRACTICE MIDWIFE

## 2018-09-11 PROCEDURE — 90686 IIV4 VACC NO PRSV 0.5 ML IM: CPT | Performed by: ADVANCED PRACTICE MIDWIFE

## 2018-09-11 PROCEDURE — 99395 PREV VISIT EST AGE 18-39: CPT | Mod: 25 | Performed by: ADVANCED PRACTICE MIDWIFE

## 2018-09-11 PROCEDURE — 90471 IMMUNIZATION ADMIN: CPT | Performed by: ADVANCED PRACTICE MIDWIFE

## 2018-09-11 PROCEDURE — G0145 SCR C/V CYTO,THINLAYER,RESCR: HCPCS | Performed by: ADVANCED PRACTICE MIDWIFE

## 2018-09-11 NOTE — PROGRESS NOTES

## 2018-09-11 NOTE — NURSING NOTE
"Chief Complaint   Patient presents with     Consult       Initial /74  Ht 5' 7\" (1.702 m)  Wt 237 lb (107.5 kg)  LMP 2018 (Exact Date)  Breastfeeding? No  BMI 37.12 kg/m2 Estimated body mass index is 37.12 kg/(m^2) as calculated from the following:    Height as of this encounter: 5' 7\" (1.702 m).    Weight as of this encounter: 237 lb (107.5 kg).  BP completed using cuff size: large        The following HM Due: pap smear      The following patient reported/Care Every where data was sent to:  P ABSTRACT QUALITY INITIATIVES [[id number]]        patient has appointment for today.    Shelly Yuen CMA                "

## 2018-09-11 NOTE — PROGRESS NOTES
Jocelyn is a 38 year old  female who presents for annual exam.     Besides routine health maintenance, she has no other health concerns today .  HPI:  Here fore annual,due for pap. Not currently on birth control, states her and her  are not sexually active but have condoms PRN. Reports irregular intervals for cycles, varies by about 7 days each month. No other health concerns today.   Menses are regular q 25-32 days and irregular every other heavy every other lighter lasting 6 days.   Mens/es flow: light and heavy.    LMP:2018  Using condoms for contraception.    She is not currently considering pregnancy.    REPRODUCTIVE/GYNECOLOGIC HISTORY:  Jocelyn is not sexually active with male partner(s) and is currently in monogamous relationship.   STI testing offered?  Declined  History of abnormal Pap smear:  No  SOCIAL HISTORY  Abuse: does not report having previously been physical or sexually abused.    Do you feel safe in your environment? YES    She  reports that she has quit smoking. She has never used smokeless tobacco.      Last PHQ-9 score on record =   PHQ-9 SCORE 2015   Total Score 7     Last GAD7 score on record = No flowsheet data found.  Alcohol Score = no    HEALTH MAINTENANCE:  Cholesterol: (  Cholesterol   Date Value Ref Range Status   2017 161 <200 mg/dL Final   2014 231 (H) <200 mg/dL Final     Comment:     LDL Cholesterol is the primary guide to therapy.   The NCEP recommends further evaluation of: patients with cholesterol greater   than 200 mg/dL if additional risk factors are present, cholesterol greater   than   240 mg/dL, triglycerides greater than 150 mg/dL, or HDL less than 40 mg/dL.    History of abnormal lipids: Yes, see labs   Mammo: no . History of abnormal Mammo: Not applicable.  Regular Self Breast Exams: Yes  TSH: (  TSH   Date Value Ref Range Status   2017 2.13 0.40 - 4.00 mU/L Final    )  Pap; (  Lab Results   Component Value Date    PAP NIL  2014    )  Immunizations up to date: yes  (Gardasil, Tdap, Flu)  Health maintenance updated:  yes    HEALTHY HABITS  Eating habits: follows a balanced nutrition diet  Calcium/Vitamin D intake: source:  dairy   Exercise: How often do you exercise? 3-5 times/week;Walking  Have you had an eye exam in the last two years? YES  Do you routinely see the dentist once or twice yearly? YES      HISTORY:  Obstetric History       T2      L2     SAB0   TAB0   Ectopic0   Multiple0   Live Births2       # Outcome Date GA Lbr Hector/2nd Weight Sex Delivery Anes PTL Lv   2 Term 08/19/15 39w5d  6 lb 12.6 oz (3.079 kg) M CS-LTranv Gen N ARYA      Apgar1:  8                Apgar5: 9   1 Term 12 41w0d  6 lb 9 oz (2.977 kg) F -SEC Gen N ARYA      Obstetric Comments   Induced at 41 weeks/ C section      Past Medical History:   Diagnosis Date     Obesity      Past Surgical History:   Procedure Laterality Date     BLOOD PATCH N/A 2015    Procedure: EPIDURAL BLOOD PATCH;  Surgeon: GENERIC ANESTHESIA PROVIDER;  [facility] OR     BUNIONECTOMY  96      SECTION  3/12      SECTION N/A 2015    Procedure:  SECTION;  Surgeon: Becky Aceves MD;  [facility] L[facility]     HC TOOTH EXTRACTION W/FORCEP       TONSILLECTOMY       Family History   Problem Relation Age of Onset     Family History Negative Mother      Family History Negative Father      HEART DISEASE Maternal Grandmother 65     enlarged heart     HEART DISEASE Maternal Grandfather      Cerebrovascular Disease Maternal Grandfather      HEART DISEASE Paternal Grandmother 40     HEART DISEASE Paternal Grandfather      Cerebrovascular Disease Paternal Grandfather 40     Social History     Social History     Marital status:      Spouse name: N/A     Number of children: N/A     Years of education: N/A     Occupational History     [employer]     Social History Main Topics      Smoking status: Former Smoker     Smokeless tobacco: Never Used      Comment: passive smoker in her 20's     Alcohol use No     Drug use: No     Sexual activity: Yes     Partners: Male     Other Topics Concern     Parent/Sibling W/ Cabg, Mi Or Angioplasty Before 65f 55m? No         Current Outpatient Prescriptions:      phentermine (ADIPEX-P) 37.5 MG tablet, Take 1 tablet (37.5 mg) by mouth every morning (before breakfast), Disp: 32 tablet, Rfl: 2     topiramate (TOPAMAX) 25 MG tablet, Take 1 tablet (25 mg) by mouth 2 times daily, Disp: 60 tablet, Rfl: 3  No current facility-administered medications for this visit.     Facility-Administered Medications Ordered in Other Visits:      bupivacaine 0.25 % - EPINEPHrine 1:200,000 injection, , , PRN, Ryan Bach MD, 20 mL at 08/20/15 0900     ketorolac (TORADOL) injection, , , PRN, Jhon Harp, APRN CRNA, 30 mg at 08/19/15 1600     Allergies   Allergen Reactions     Blood Transfusion Related (Informational Only) Other (See Comments)     Patient has a history of a clinically significant antibody against RBC antigens.  A delay in compatible RBCs may occur.     Nkda [No Known Drug Allergies]        Past medical, surgical, social and family history were reviewed and updated in [facility].    ROS:   CONSTITUTIONAL: NEGATIVE for fever, chills, change in weight  INTEGUMENTARU/SKIN: NEGATIVE for worrisome rashes, moles or lesions  EYES: NEGATIVE for vision changes or irritation  ENT: NEGATIVE for ear, mouth and throat problems  RESP: NEGATIVE for significant cough or SOB  BREAST: NEGATIVE for masses, tenderness or discharge  CV: NEGATIVE for chest pain, palpitations or peripheral edema  GI: NEGATIVE for nausea, abdominal pain, heartburn, or change in bowel habits  : NEGATIVE for unusual urinary or vaginal symptoms. Periods are regular.  MUSCULOSKELETAL: NEGATIVE for significant arthralgias or myalgia  NEURO: NEGATIVE for weakness, dizziness or  paresthesias  PSYCHIATRIC: NEGATIVE for changes in mood or affect    PHYSICAL EXAM:  There were no vitals taken for this visit.   BMI: There is no height or weight on file to calculate BMI.  Constitutional: healthy, alert and no distress  Neck: symmetrical, thyroid normal size, no masses present, no lymphadenopathy present.   Cardiovascular: RRR, no murmurs present  Respiratory: breathing unlabored, lungs CTA bilaterally  Breast:normal without masses, tenderness or nipple discharge and no palpable axillary masses or adenopathy  Gastrointestinal: abdomen soft, non-tender, bowel sounds present  PELVIC EXAM:  Vulva: No lesions, no adenopathy, BUS WNL  Vagina: Moist, pink, discharge normal  well rugated, no lesions  Cervix:smooth, pink, no visible lesions  Uterus: Normal size, non-tender, mobile  Ovaries: No masses palpated  Rectal exam: deferred    ASSESSMENT/PLAN:  (Z00.00) Routine general medical examination at a health care facility  (primary encounter diagnosis)  Comment: wnl  Plan: return to clinic 1 year     (Z12.4) Cervical cancer screening  Comment: collected   Plan: PAP imaged thin layer screen        Results pending     (Z11.51) Screening for human papillomavirus  Comment: collected   Plan: HPV High Risk Types DNA Cervical        Results pending       Return to clinic 1 year for well woman exam.       COUNSELING:   Reviewed preventive health counseling, as reflected in patient instructions       Regular exercise       Healthy diet/nutrition       Contraception   reports that she has quit smoking. She has never used smokeless tobacco.  -discussed option of starting birth control to help regulate menses, patient states she does not desire birth control at this time. Reviewed that hormonal birth control can help regulate menses.   -Patient also declines need for contraception at this time d/t not sexually active. Reviewed patient can return to clinic for contraception PRN or if menses become more irregular.      Emma Durand, ALO, CNM

## 2018-09-11 NOTE — MR AVS SNAPSHOT
After Visit Summary   9/11/2018    Jocelyn Weber    MRN: [id number]           Patient Information     Date Of Birth          1980        Visit Information        Provider Department      9/11/2018 8:30 AM Emma Durand APRN [facility]        Today's Diagnoses     Routine general medical examination at a health care facility    -  1    Cervical cancer screening        Screening for human papillomavirus        Need for prophylactic vaccination and inoculation against influenza           Follow-ups after your visit        Follow-up notes from your care team     Return in about 1 year (around 9/11/2019) for Physical Exam.      Your next 10 appointments already scheduled     Dec 07, 2018  9:00 AM CST   Return Visit with ALO Lei [facility]    [address]  [facility] [address] [phone]              Who to contact     If you have questions or need follow up information about today's clinic visit or your schedule please contact [facility] directly at [phone].  Normal or non-critical lab and imaging results will be communicated to you by [employer], letter or phone within 4 business days after the clinic has received the results. If you do not hear from us within 7 days, please contact the clinic through [employer] or phone. If you have a critical or abnormal lab result, we will notify you by phone as soon as possible.  Submit refill requests through [employer] or call your pharmacy and they will forward the refill request to us. Please allow 3 business days for your refill to be completed.          Additional Information About Your Visit        MyChart Information     [employer] gives you secure access to your electronic health record. If you see a primary care provider, you can also send messages to your care team and make appointments. If you have questions, please call your  "primary care clinic.  If you do not have a primary care provider, please call [phone] and they will assist you.        Care EveryWhere ID     This is your Care EveryWhere ID. This could be used by other organizations to access your [place] medical records  [id number]        Your Vitals Were     Height Last Period Breastfeeding? BMI (Body Mass Index)          5' 7\" (1.702 m) 08/24/2018 (Exact Date) No 37.12 kg/m2         Blood Pressure from Last 3 Encounters:   09/11/18 124/74   08/31/18 104/80   05/02/18 100/70    Weight from Last 3 Encounters:   09/11/18 237 lb (107.5 kg)   08/31/18 236 lb (107 kg)   05/02/18 235 lb 9.6 oz (106.9 kg)              We Performed the Following     FLU VACCINE, SPLIT VIRUS, IM (QUADRIVALENT) [24044]- >3 YRS     HPV High Risk Types DNA Cervical     PAP imaged thin layer screen     Vaccine Administration, Initial [86043]        Primary Care Provider Office Phone # Fax #    Carri ALO Sigala [facility] [phone]       [address] [facility] [address]        Equal Access to Services     BRODY QUARLES AH: Hadii aad ku hadasho Soomaali, waaxda luqadaha, qaybta kaalmada adeegyada, yenifer quintero. So [facility] [phone].    ATENCIÓN: Si habla español, tiene a maldonado disposición servicios gratuitos de asistencia lingüística. Llame al [phone].    We comply with applicable federal civil rights laws and [place] laws. We do not discriminate on the basis of race, color, national origin, age, disability, sex, sexual orientation, or gender identity.            Thank you!     Thank you for choosing [facility]  for your care. Our goal is always to provide you with excellent care. Hearing back from our patients is one way we can continue to improve our services. Please take a few minutes to complete the written survey that you may receive in the mail after your visit with us. Thank you!             Your Updated Medication List - Protect " others around you: Learn how to safely use, store and throw away your medicines at www.disposemymeds.org.          This list is accurate as of 9/11/18  9:25 AM.  Always use your most recent med list.                   Brand Name Dispense Instructions for use Diagnosis    phentermine 37.5 MG tablet    ADIPEX-P    32 tablet    Take 1 tablet (37.5 mg) by mouth every morning (before breakfast)    Morbid obesity due to excess calories (H)       topiramate 25 MG tablet    TOPAMAX    60 tablet    Take 1 tablet (25 mg) by mouth 2 times daily    Morbid obesity due to excess calories (H), BMI 36.0-36.9,adult, Hyperlipidemia LDL goal <130

## 2018-09-13 LAB
COPATH REPORT: NORMAL
PAP: NORMAL

## 2018-09-14 LAB
FINAL DIAGNOSIS: NORMAL
HPV HR 12 DNA CVX QL NAA+PROBE: NEGATIVE
HPV16 DNA SPEC QL NAA+PROBE: NEGATIVE
HPV18 DNA SPEC QL NAA+PROBE: NEGATIVE
SPECIMEN DESCRIPTION: NORMAL
SPECIMEN SOURCE CVX/VAG CYTO: NORMAL

## 2018-10-16 NOTE — PROGRESS NOTES
"Date:   Clinician: Leandro Jarrell  Clinician NPI: [id number]  Patient: oJcelyn Weber  Patient : 1980  Patient Address: [address] [place], [place] [address]  Patient Phone: [phone]  Visit Protocol: URI  Patient Summary:  Jocelyn is a 38 year old ( : 1980 ) female who initiated a Visit for cold, sinus infection, or influenza. When asked the question \"Please sign me up to receive news, health information and promotions. \", Jocelyn responded \"No\".    Jocelyn states her symptoms started 1-2 days ago.   Her symptoms consist of enlarged lymph nodes, a headache, a sore throat, nasal congestion, malaise, facial pain or pressure, myalgia, tooth pain, rhinitis, and chills.   Symptom details     Nasal secretions: The color of her mucus is green and yellow.    Sore throat: Jocelyn reports having moderate throat pain (4-6 on a 10 point pain scale), does not have exudate on her tonsils, and can swallow liquids. The lymph nodes in her neck are enlarged. A rash has not appeared on the skin since the sore throat started.     Facial pain or pressure: The facial pain or pressure feels worse when bending over or leaning forward.     Headache: She states the headache is mild (1-3 on a 10 point pain scale).     Tooth pain: The tooth pain is not caused by a cavity, recent dental work, or other mouth problems.      Jocelyn denies having ear pain, dyspnea, fever, wheezing, and cough. She also denies taking antibiotic medication for the symptoms and having recent facial or sinus surgery in the past 60 days.   Jocelyn is not sure if she has been exposed to someone with strep throat. She has not recently been exposed to someone with influenza. Jocelyn has been in close contact with the following high risk individuals: children under the age of 5 and people with asthma, heart disease or diabetes.   Jocelyn had 1 sinus infection within the past year.   Weight: 235 lbs   Jocelyn does not smoke or use " smokeless tobacco.   She denies pregnancy and denies breastfeeding. She has menstruated in the past month.   Additional information as reported by the patient (free text): My son is having a tonsillectomy on Friday 10/19/18 and I will be caring for him. My whole family has had flu shots within the past month.    MEDICATIONS: phentermine-topiramate oral, ALLERGIES: NKDA  Clinician Response:  Dear [facility] Strep Test    I am sorry you are not feeling well. Based on the information provided, it is possible you could have strep throat. When left untreated, strep can spread to other areas of the body and cause a more serious infection.  A rapid strep test is the only way to determine if a bacterial infection or a virus is causing your sore throat. This is done in a lab where a swab of the back of your throat is collected and tested for the bacteria that causes strep.  Since you chose not to use the lab order, please be seen:     In a clinic or urgent care    Within 24 hours     Call 911 or go to the emergency room if you suddenly develop a rash, are drooling or unable to swallow fluids, if you are having difficulty breathing, or feel that your throat is closing off.   Diagnosis: [employer]icket Strep  Diagnosis ICD: J02.0  ZipTicket Results: [place] Strep Test - Declined  ZipTicket Secondary Results: null

## 2018-10-17 ENCOUNTER — OFFICE VISIT (OUTPATIENT)
Dept: FAMILY MEDICINE | Facility: CLINIC | Age: 38
End: 2018-10-17
Payer: COMMERCIAL

## 2018-10-17 VITALS
BODY MASS INDEX: 38.22 KG/M2 | HEART RATE: 71 BPM | TEMPERATURE: 98.8 F | SYSTOLIC BLOOD PRESSURE: 118 MMHG | DIASTOLIC BLOOD PRESSURE: 80 MMHG | WEIGHT: 244 LBS

## 2018-10-17 DIAGNOSIS — J01.90 ACUTE SINUSITIS WITH SYMPTOMS > 10 DAYS: Primary | ICD-10-CM

## 2018-10-17 PROCEDURE — 99213 OFFICE O/P EST LOW 20 MIN: CPT | Performed by: NURSE PRACTITIONER

## 2018-10-17 NOTE — MR AVS SNAPSHOT
After Visit Summary   10/17/2018    Jocelyn Weber    MRN: [id number]           Patient Information     Date Of Birth          1980        Visit Information        Provider Department      10/17/2018 1:20 PM Mark Anthony Henderson NP [facility] [place]        Today's Diagnoses     Acute sinusitis with symptoms > 10 days    -  1      Care Instructions    1. Augmentin for 10 days   Take with food (preferably with meals)   Eat yogurt with cultures or even take probiotic   Take entire course    2. Come back if you are still feeling crummy after 7 days or if you're getting worse in the meantime.          Follow-ups after your visit        Follow-up notes from your care team     Return in about 1 week (around 10/24/2018), or if symptoms worsen or fail to improve.      Your next 10 appointments already scheduled     Dec 07, 2018  9:00 AM CST   Return Visit with ALO Lei CNP   [facility]    [address]  [facility] [address] [phone]              Future tests that were ordered for you today     Open Future Orders        Priority Expected Expires Ordered    Rapid strep screen Routine  10/17/2018 10/16/2018            Who to contact     If you have questions or need follow up information about today's clinic visit or your schedule please contact [facility] [place] directly at [phone].  Normal or non-critical lab and imaging results will be communicated to you by MyChart, letter or phone within 4 business days after the clinic has received the results. If you do not hear from us within 7 days, please contact the clinic through MyChart or phone. If you have a critical or abnormal lab result, we will notify you by phone as soon as possible.  Submit refill requests through [employer] or call your pharmacy and they will forward the refill request to us. Please allow 3 business days for your refill to be  completed.          Additional Information About Your Visit        [employer] Information     [employer] gives you secure access to your electronic health record. If you see a primary care provider, you can also send messages to your care team and make appointments. If you have questions, please call your primary care clinic.  If you do not have a primary care provider, please call [phone] and they will assist you.        Care EveryWhere ID     This is your Care EveryWhere ID. This could be used by other organizations to access your [facility] medical records  [id number]        Your Vitals Were     Pulse Temperature Last Period BMI (Body Mass Index)          71 98.8  F (37.1  C) (Tympanic) 09/27/2018 38.22 kg/m2         Blood Pressure from Last 3 Encounters:   10/17/18 118/80   09/11/18 124/74   08/31/18 104/80    Weight from Last 3 Encounters:   10/17/18 244 lb (110.7 kg)   09/11/18 237 lb (107.5 kg)   08/31/18 236 lb (107 kg)              Today, you had the following     No orders found for display         Today's Medication Changes          These changes are accurate as of 10/17/18  1:43 PM.  If you have any questions, ask your nurse or doctor.               Start taking these medicines.        Dose/Directions    amoxicillin-clavulanate 875-125 MG per tablet   Commonly known as:  AUGMENTIN   Used for:  Acute sinusitis with symptoms > 10 days   Started by:  Mark Anthony Henderson, NP        Dose:  1 tablet   Take 1 tablet by mouth 2 times daily   Quantity:  20 tablet   Refills:  0            Where to get your medicines      Some of these will need a paper prescription and others can be bought over the counter.  Ask your nurse if you have questions.     Bring a paper prescription for each of these medications     amoxicillin-clavulanate 875-125 MG per tablet                Primary Care Provider Office Phone # Fax #    ALO Stevens [facility] [phone] [address] [place] [address]         Equal Access to Services     [facility] : Hadii aad ku hadsumankrunal Ethelali, waaxda luqadaha, qaybta kaantoineyenifer lay. So [facility] [phone].    ATENCIÓN: Si habla español, tiene a maldonado disposición servicios gratuitos de asistencia lingüística. Shaame al [phone].    We comply with applicable federal civil rights laws and [place] laws. We do not discriminate on the basis of race, color, national origin, age, disability, sex, sexual orientation, or gender identity.            Thank you!     Thank you for choosing [facility] [place]  for your care. Our goal is always to provide you with excellent care. Hearing back from our patients is one way we can continue to improve our services. Please take a few minutes to complete the written survey that you may receive in the mail after your visit with us. Thank you!             Your Updated Medication List - Protect others around you: Learn how to safely use, store and throw away your medicines at www.disposemymeds.org.          This list is accurate as of 10/17/18  1:43 PM.  Always use your most recent med list.                   Brand Name Dispense Instructions for use Diagnosis    amoxicillin-clavulanate 875-125 MG per tablet    AUGMENTIN    20 tablet    Take 1 tablet by mouth 2 times daily    Acute sinusitis with symptoms > 10 days       phentermine 37.5 MG tablet    ADIPEX-P    32 tablet    Take 1 tablet (37.5 mg) by mouth every morning (before breakfast)    Morbid obesity due to excess calories (H)       topiramate 25 MG tablet    TOPAMAX    60 tablet    Take 1 tablet (25 mg) by mouth 2 times daily    Morbid obesity due to excess calories (H), BMI 36.0-36.9,adult, Hyperlipidemia LDL goal <130

## 2018-10-17 NOTE — PROGRESS NOTES
SUBJECTIVE:                                                      Jocelyn Weber is a 38 year old female who presents to clinic today for the following health issues:    Acute Illness   Acute illness concerns: congestion   Onset: a week     Fever: no    Chills/Sweats: YES    Headache (location?): YES    Sinus Pressure:YES    Conjunctivitis:  YES- watery     Ear Pain: YES- plugged, both ears     Rhinorrhea: YES    Congestion: YES    Sore Throat: post nasal drainage      Cough: YES-productive of green sputum    Wheeze: no    Decreased Appetite: YES    Nausea: no    Vomiting: no    Diarrhea:  no    Dysuria/Freq.: no    Fatigue/Achiness: no    Sick/Strep Exposure: YES-  had bronchitis      Therapies Tried and outcome: None noted.    HPI: Jocelyn began experiencing URI symptoms a couple weeks ago. Cough, sore throat, tooth pressure / pain, sinus / facial pain, popping / plugged ears, watery eyes, fogginess, and green nasal discharge are her primary symptoms. She denies fever, nausea, vomiting, rash, SOB, and wheezing.     Problem list and histories reviewed & adjusted, as indicated.  Additional history: as documented    Reviewed and updated as needed this visit by clinical staff  Tobacco  Allergies  Meds  Problems       Reviewed and updated as needed this visit by Provider  Allergies  Meds  Problems         ROS:  Constitutional, HEENT,  pulmonary, GI systems are negative, except as otherwise noted.    OBJECTIVE:                                                      /80 (BP Location: Right arm, Patient Position: Chair, Cuff Size: Adult Regular)  Pulse 71  Temp 98.8  F (37.1  C) (Tympanic)  Wt 244 lb (110.7 kg)  LMP 09/27/2018  BMI 38.22 kg/m2 Body mass index is 38.22 kg/(m^2).   GENERAL: mildly ill-appearing, alert, well nourished, well hydrated, no distress  HENT: ear canals- normal; TMs- left normal; right with yellow fluid behind but without redness, bulge or retraction; Nose- yellow  rhinorrea; Mouth- mildly erythematous posterior oropharynx with some yellow drainage noted.  NECK: no tenderness, no adenopathy, no asymmetry, no masses, no stiffness; thyroid- normal to palpation  RESP: lungs clear to auscultation - no rales, no rhonchi, no wheezes  CV: regular rates and rhythm, normal S1 S2, no S3 or S4 and no murmur, no click or rub -  SKIN: no suspicious lesions, no rashes    Diagnostic test results:  none     ASSESSMENT/PLAN:                                                      Jocelyn was seen today for nasal congestion. Given the duration and symptomatic trajectory of her complaint, I am fairly confident that she has acute sinusitis of bacterial etiology. Will treat with 10 days of Augmentin. We discussed possible GI side effects and suggested taking with meals and using yogurt and/or probiotics ( by a few hours from abx administration) to combat these side effects. She acknowledges understanding. She agrees to call back in one week if worse or no better. Agrees with plan, and all questions answered.     Diagnoses and all orders for this visit:    Acute sinusitis with symptoms > 10 days  -     amoxicillin-clavulanate (AUGMENTIN) 875-125 MG per tablet; Take 1 tablet by mouth 2 times daily    Risks, benefits and alternatives of treatments discussed. Plan agreed upon and all questions answered.      Follow-Up: Return in about 1 week (around 10/24/2018), or if symptoms worsen or fail to improve.    See Patient Instructions      Mark Anthony Henderson, APRN, CNP

## 2018-10-17 NOTE — PATIENT INSTRUCTIONS
1. Augmentin for 10 days   Take with food (preferably with meals)   Eat yogurt with cultures or even take probiotic   Take entire course    2. Come back if you are still feeling crummy after 7 days or if you're getting worse in the meantime.      UPPER RESPIRATORY INFECTION SUPPORTIVE CARES:  Over-the-counter decongestants for sinus and nasal congestion  Warm compresses over sinuses, or allow warm shower water to run down face  Neti-Pot sinus / nasal rinse  Flonase or other intranasal steroid MAY help  Tylenol or ibuprofen for fever and aches & pains  Chloraseptic spray or Cepacol lozenges for sore throat  Over-the-counter cough suppressants, cough drops, or honey for cough  Call or return to the clinic if you are getting worse or not getting better in the coming days  Go to the Emergency Room if you have trouble breathing or if your fever gets really high

## 2018-12-06 ENCOUNTER — MYC REFILL (OUTPATIENT)
Dept: ENDOCRINOLOGY | Facility: CLINIC | Age: 38
End: 2018-12-06

## 2018-12-06 DIAGNOSIS — E78.5 HYPERLIPIDEMIA LDL GOAL <130: ICD-10-CM

## 2018-12-06 DIAGNOSIS — E66.01 MORBID OBESITY DUE TO EXCESS CALORIES (H): ICD-10-CM

## 2018-12-06 RX ORDER — TOPIRAMATE 25 MG/1
25 TABLET, FILM COATED ORAL 2 TIMES DAILY
Qty: 60 TABLET | Refills: 3 | Status: CANCELLED | OUTPATIENT
Start: 2018-12-06

## 2018-12-06 RX ORDER — PHENTERMINE HYDROCHLORIDE 37.5 MG/1
37.5 TABLET ORAL
Qty: 32 TABLET | Refills: 2 | Status: CANCELLED | OUTPATIENT
Start: 2018-12-06

## 2018-12-06 NOTE — TELEPHONE ENCOUNTER
Message from MyChart:  Original authorizing provider: ALO Lei CNP would like a refill of the following medications:  phentermine (ADIPEX-P) 37.5 MG tablet [ALO Lei CNP]  topiramate (TOPAMAX) 25 MG tablet [ALO Lei CNP]    Preferred pharmacy: [facility] [address] [place], [place] [id number] [facility] [place] [address] & [facility]    Comment:

## 2018-12-07 ENCOUNTER — OFFICE VISIT (OUTPATIENT)
Dept: ENDOCRINOLOGY | Facility: CLINIC | Age: 38
End: 2018-12-07
Payer: COMMERCIAL

## 2018-12-07 VITALS
DIASTOLIC BLOOD PRESSURE: 78 MMHG | HEART RATE: 82 BPM | BODY MASS INDEX: 39 KG/M2 | WEIGHT: 249 LBS | SYSTOLIC BLOOD PRESSURE: 129 MMHG | TEMPERATURE: 97.7 F

## 2018-12-07 DIAGNOSIS — E66.812 CLASS 2 OBESITY DUE TO EXCESS CALORIES WITHOUT SERIOUS COMORBIDITY WITH BODY MASS INDEX (BMI) OF 39.0 TO 39.9 IN ADULT: Primary | ICD-10-CM

## 2018-12-07 DIAGNOSIS — E66.09 CLASS 2 OBESITY DUE TO EXCESS CALORIES WITHOUT SERIOUS COMORBIDITY WITH BODY MASS INDEX (BMI) OF 39.0 TO 39.9 IN ADULT: Primary | ICD-10-CM

## 2018-12-07 DIAGNOSIS — E66.01 MORBID OBESITY DUE TO EXCESS CALORIES (H): ICD-10-CM

## 2018-12-07 PROCEDURE — 99213 OFFICE O/P EST LOW 20 MIN: CPT | Performed by: CLINICAL NURSE SPECIALIST

## 2018-12-07 RX ORDER — TOPIRAMATE 50 MG/1
50 TABLET, FILM COATED ORAL 2 TIMES DAILY
Qty: 60 TABLET | Refills: 3 | Status: SHIPPED | OUTPATIENT
Start: 2018-12-07 | End: 2019-03-07

## 2018-12-07 RX ORDER — PHENTERMINE HYDROCHLORIDE 37.5 MG/1
37.5 TABLET ORAL
Qty: 32 TABLET | Refills: 2 | Status: SHIPPED | OUTPATIENT
Start: 2018-12-07 | End: 2019-03-07

## 2018-12-07 NOTE — MR AVS SNAPSHOT
After Visit Summary   12/7/2018    Jocelyn Weber    MRN: [id number]           Patient Information     Date Of Birth          1980        Visit Information        Provider Department      12/7/2018 9:00 AM Nancy Goodson APRN CNP [facility]        Today's Diagnoses     Morbid obesity due to excess calories (H)          Care Instructions        Increase Topamax to 25 mg am, 50 mg pm and then 50 mg twice daily as tolerated or as needed.    Continue Phentermine.    Check out meal planning mommies . Com    Nancy Goodson NP  Endocrinology            Follow-ups after your visit        Your next 10 appointments already scheduled     Mar 07, 2019  7:30 AM CST   Return Visit with ALO Lei CNP   [facility]    [address]  [facility] [address] [phone]              Who to contact     If you have questions or need follow up information about today's clinic visit or your schedule please contact [facility] directly at [phone].  Normal or non-critical lab and imaging results will be communicated to you by Framehawkhart, letter or phone within 4 business days after the clinic has received the results. If you do not hear from us within 7 days, please contact the clinic through Frame[place]art or phone. If you have a critical or abnormal lab result, we will notify you by phone as soon as possible.  Submit refill requests through Gibberin or call your pharmacy and they will forward the refill request to us. Please allow 3 business days for your refill to be completed.          Additional Information About Your Visit        [employer]art Information     Gibberin gives you secure access to your electronic health record. If you see a primary care provider, you can also send messages to your care team and make appointments. If you have questions, please call your primary care clinic.  If you do not have a  primary care provider, please call [phone] and they will assist you.        Care EveryWhere ID     This is your Care EveryWhere ID. This could be used by other organizations to access your [facility] medical records  [id number]        Your Vitals Were     Pulse Temperature Last Period Breastfeeding? BMI (Body Mass Index)       82 97.7  F (36.5  C) (Oral) 11/25/2018 No 39 kg/m2        Blood Pressure from Last 3 Encounters:   12/07/18 129/78   10/17/18 118/80   09/11/18 124/74    Weight from Last 3 Encounters:   12/07/18 112.9 kg (249 lb)   10/17/18 110.7 kg (244 lb)   09/11/18 107.5 kg (237 lb)              Today, you had the following     No orders found for display         Today's Medication Changes          These changes are accurate as of 12/7/18  9:33 AM.  If you have any questions, ask your nurse or doctor.               These medicines have changed or have updated prescriptions.        Dose/Directions    topiramate 50 MG tablet   Commonly known as:  TOPAMAX   This may have changed:    - medication strength  - how much to take   Used for:  Morbid obesity due to excess calories (H)   Changed by:  Nancy Goodson APRN CNP        Dose:  50 mg   Take 1 tablet (50 mg) by mouth 2 times daily   Quantity:  60 tablet   Refills:  3            Where to get your medicines      These medications were sent to [facility] [address]  [address] [facility] [address]     Phone:  [phone]     topiramate 50 MG tablet         Some of these will need a paper prescription and others can be bought over the counter.  Ask your nurse if you have questions.     Bring a paper prescription for each of these medications     phentermine 37.5 MG tablet                Primary Care Provider Office Phone # Fax #    ALO Stevens Ra, [phone] [address]  [facility] [address]        Equal Access to Services     BRODY QUARLES AH: Ling perry  patricia Garza, aurea montgomeryadaha, qajuvenciota kavenkatesh arturgenoveva, yenifer anahiin hayaakristin siddiquineto fideliacarl larileykristin ingrid. So [facility] [phone].    ATENCIÓN: Si habla ramirezañol, tiene a maldonado disposición servicios gratuitos de asistencia lingüística. Aidee al [phone].    We comply with applicable federal civil rights laws and [place] laws. We do not discriminate on the basis of race, color, national origin, age, disability, sex, sexual orientation, or gender identity.            Thank you!     Thank you for choosing [facility]  for your care. Our goal is always to provide you with excellent care. Hearing back from our patients is one way we can continue to improve our services. Please take a few minutes to complete the written survey that you may receive in the mail after your visit with us. Thank you!             Your Updated Medication List - Protect others around you: Learn how to safely use, store and throw away your medicines at www.disposemymeds.org.          This list is accurate as of 12/7/18  9:33 AM.  Always use your most recent med list.                   Brand Name Dispense Instructions for use Diagnosis    amoxicillin-clavulanate 875-125 MG tablet    AUGMENTIN    20 tablet    Take 1 tablet by mouth 2 times daily    Acute sinusitis with symptoms > 10 days       phentermine 37.5 MG tablet    ADIPEX-P    32 tablet    Take 1 tablet (37.5 mg) by mouth every morning (before breakfast)    Morbid obesity due to excess calories (H)       topiramate 50 MG tablet    TOPAMAX    60 tablet    Take 1 tablet (50 mg) by mouth 2 times daily    Morbid obesity due to excess calories (H)

## 2018-12-07 NOTE — PROGRESS NOTES
Name: Jocelyn Weber (Last seen 2018)  F/u For   Chief Complaint   Patient presents with     Weight Problem     HPI:  Jocelyn Weber is a 38 year old female who presents for the evaluation/management of steady weight gain over the years and inability to lose weight with effort.  She is concerned it may be due to thyroid disease.      The only time she was able to lose weight was in  while taking phentermine, was able to lose about 40 lbs.  FH is + for mother with hypothyroidism.  Recent labs were unremarkable, thyroid antibodies were not elevated, TSH was < 3 with normal T3 and T4 levels.    She has two young children.    She was interested in starting weight loss medication.  Phentermine was started 3/9/2017.   Topamax 25 mg bid added 2018.  Started a new job.  Plans to join weight watchers at work - sponsored by the company.    Continues to tolerate phentermine well.  Topamax causing Pepsi to taste funny but otherwise tolerating well.    PMH/PSH:  Past Medical History:   Diagnosis Date     Obesity      Past Surgical History:   Procedure Laterality Date     BLOOD PATCH N/A 2015    Procedure: EPIDURAL BLOOD PATCH;  Surgeon: GENERIC ANESTHESIA PROVIDER;  Location: [facility] OR     BUNIONECTOMY  96      SECTION  3/12      SECTION N/A 2015    Procedure:  SECTION;  Surgeon: Becky Aceves MD;  Location: [facility] L+D     HC TOOTH EXTRACTION W/FORCEP       TONSILLECTOMY       Family Hx:  Family History   Problem Relation Age of Onset     Family History Negative Mother      Other - See Comments Mother      hysterectomy d/t pre-cancer uterine polyps      Family History Negative Father      Other - See Comments Father      aortic disection      Heart Disease Maternal Grandmother 65     enlarged heart     Heart Disease Maternal Grandfather      Cerebrovascular Disease Maternal Grandfather      Heart Disease Paternal Grandmother 40     Heart Disease Paternal Grandfather       Cerebrovascular Disease Paternal Grandfather 40     Ovarian Cancer Maternal Aunt      Thyroid disease: Yes: mother.         DM2: Yes: MGF         Autoimmune: DM1, SLE, RA, Vitiligo No    Social Hx:         MEDICATIONS:  has a current medication list which includes the following prescription(s): amoxicillin-clavulanate, phentermine, and topiramate, and the following Facility-Administered Medications: bupivacaine 0.25 % - epinephrine 1:200,000 and ketorolac.    ROS     ROS: 10 point ROS neg other than the symptoms noted above in the HPI.      Physical Exam   VS: /78 (BP Location: Right arm, Patient Position: Chair, Cuff Size: Adult Large)  Pulse 82  Temp 97.7  F (36.5  C) (Oral)  Wt 112.9 kg (249 lb)  LMP 11/25/2018  Breastfeeding? No  BMI 39 kg/m2  GENERAL: NAD, well dressed, answering questions appropriately, appears stated age.  HEENT: no exopthalmous, no proptosis, no lig lag, no retraction, no scleral icterus  RESPIRATORY: Clear. Normal respiratory effort.  CARDIOVASCULAR: RRR. No peripheral edema.  EXTREMITIES: no edema  NEUROLOGY: CN grossly intact, no tremors  MSK: grossly intact. Normal gait and station.  PSYCH: Intact judgment and insight. A&OX3 with a cordial affect.    LABS:  Component    Latest Ref Rng & Units 1/31/2017   TSH    0.40 - 4.00 mU/L 2.13   T4 Free    0.76 - 1.46 ng/dL 1.02   Free T3    2.3 - 4.2 pg/mL 2.7   Thyroglobulin Antibody    <40 IU/mL <20   Thyroid Peroxidase Antibody    <35 IU/mL <10     !COMPREHENSIVE Latest Ref Rng & Units 1/31/2017   SODIUM 133 - 144 mmol/L 140   POTASSIUM 3.4 - 5.3 mmol/L 4.2   CHLORIDE 94 - 109 mmol/L 109   BUN 7 - 30 mg/dL 11   Creatinine 0.52 - 1.04 mg/dL 0.73   Glucose 70 - 99 mg/dL 93   ANION GAP 3 - 14 mmol/L 5   CALCIUM 8.5 - 10.1 mg/dL 8.8   ALBUMIN 3.4 - 5.0 g/dL 3.6     !LIPID/HEPATIC Latest Ref Rng & Units 4/9/2014 1/31/2017   CHOLESTEROL <200 mg/dL 231 (H) 161   TRIGLYCERIDES <150 mg/dL 167 (H) 100   HDL CHOLESTEROL >49 mg/dL 41 (L) 37  "(L)   LDL CHOLESTEROL, CALCULATED <100 mg/dL 157 (H) 104 (H)   VLDL-CHOLESTEROL 0 - 30 mg/dL 33 (H)    NON HDL CHOLESTEROL <130 mg/dL  124   CHOLESTEROL/HDL RATIO 0.0 - 5.0 5.7 (H)    AST 0 - 45 U/L  31   ALT 0 - 50 U/L  59 (H)     Component    Latest Ref Rng & Units 2/5/2017   Saliva Collection Time     2300   Cortisol Saliva    ug/dL 0.057     Vital Signs 9/30/2015 11/18/2016 1/31/2017 4/12/2017   Weight (LB) 224 lb 265 lb 1 oz 258 lb 14.4 oz 249 lb 6.4 oz   Height  5' 7\" 5' 6.75\" 5' 6.75\"   BMI  41.6 40.94 39.35     Vital Signs 12/6/2017 2/20/2018 4/9/2018 5/2/2018   Weight (LB) 240 lb 4.8 oz 239 lb 243 lb 235 lb 9.6 oz   Height    5' 7\"   BMI (Calculated)    36.98     Vital Signs 8/31/2018 9/11/2018 10/17/2018 12/7/2018   Weight (LB) 236 lb 237 lb 244 lb 249 lb   Height 5' 7\" 5' 7\"     BMI (Calculated) 37.04 37.2  39     Waist Circumference: 51\" (3/9/17).  Waist circumference 46.75\" (4/12/2017).  47.75\" (7/26/2017).  44.25\" (12/6/2017). 42.75\" (5/2018).  47.5\" (today)    All pertinent notes, labs, and images personally reviewed by me.     A/P  Ms.Jonelle Weber is a 38 year old here for the evaluation of:    1. Class 2 obesity due to excess calories without serious comorbidity with body mass index (BMI) of 39.0 to 39.9 in adult    2. Morbid obesity due to excess calories (H)      I informed the pt that:  1.Weight loss medications can be taken to assist with weight reduction when combined with appropriate healthy lifestyle changes.  2.I discussed possible s/e, risks and benefits of weight loss medications.  3.All medications are FDA approved, however, some may be used ''off label'' for their weight loss benefits and some ''short term'' medications can be used for longer periods to achieve desired clinical outcomes.  4.All patients taking weight loss medications must be seen in clinic for refill authorization.    Counseling exercise ( V65.41) - trying to exercise daily - now has more time, quit her second " job  Dietary counseling( V65.3) - tracking intake using calorie tracking kendrick  Calculated BMI above the upper parameter and f/u plan was documented in the medical record.  Discussed indications, risks and benefits of all medications prescribed, and answered questions to patient's satisfaction.  Had lost 30 lbs since starting phentermine + diet and exercise efforts, 265-->249-->240-->235 lbs.  BMI has decreased 40.94-->37.92-->36.98.  Weight increased since last seen, 236-->249 lbs  Join weight watchers as planned.  Continue Phentermine 37.5 mg every day + increase topiramate to 50 mg bid.    Hyperlipidemia - Currently untreated.  Total and LDL cholesterol has improved compared with 2014.  I would expect some further improvement with diet and exercise efforts.  Will continue to monitor.    Labs ordered today:   No orders of the defined types were placed in this encounter.    Radiology/Consults ordered today: None    More than 50% of the time spent with Ms. Weber on counseling / coordinating her care.  Total face to face time was greater than or equal to 15 minutes.      Follow-up:  3 months    Nancy Goodson NP  Endocrinology  [facility]  CC: Carri Collins Ra

## 2018-12-07 NOTE — PATIENT INSTRUCTIONS
Increase Topamax to 25 mg am, 50 mg pm and then 50 mg twice daily as tolerated or as needed.    Continue Phentermine.    Check out meal planning mommies . Com    Nancy Godoson NP  Endocrinology

## 2018-12-07 NOTE — LETTER
2018         RE: Jocelyn Weber  [address] [facility] [address]        Dear Colleague,    Thank you for referring your patient, Jocelyn Weber, to the [facility]. Please see a copy of my visit note below.    Name: Jocelyn Weber (Last seen 2018)  F/u For   Chief Complaint   Patient presents with     Weight Problem     HPI:  Jocelyn Weber is a 38 year old female who presents for the evaluation/management of steady weight gain over the years and inability to lose weight with effort.  She is concerned it may be due to thyroid disease.      The only time she was able to lose weight was in  while taking phentermine, was able to lose about 40 lbs.  FH is + for mother with hypothyroidism.  Recent labs were unremarkable, thyroid antibodies were not elevated, TSH was < 3 with normal T3 and T4 levels.    She has two young children.    She was interested in starting weight loss medication.  Phentermine was started 3/9/2017.   Topamax 25 mg bid added 2018.  Started a new job.  Plans to join weight watchers at work - sponsored by the company.    Continues to tolerate phentermine well.  Topamax causing Pepsi to taste funny but otherwise tolerating well.    PMH/PSH:  Past Medical History:   Diagnosis Date     Obesity      Past Surgical History:   Procedure Laterality Date     BLOOD PATCH N/A 2015    Procedure: EPIDURAL BLOOD PATCH;  Surgeon: GENERIC ANESTHESIA PROVIDER;  [facility]: [facility] OR     BUNIONECTOMY  96      SECTION  3/12      SECTION N/A 2015    Procedure:  SECTION;  Surgeon: Becky Aceves MD;  [facility] L+D     HC TOOTH EXTRACTION W/FORCEP       TONSILLECTOMY       Family Hx:  Family History   Problem Relation Age of Onset     Family History Negative Mother      Other - See Comments Mother      hysterectomy d/t pre-cancer uterine polyps      Family History Negative Father      Other - See Comments Father       aortic disection      Heart Disease Maternal Grandmother 65     enlarged heart     Heart Disease Maternal Grandfather      Cerebrovascular Disease Maternal Grandfather      Heart Disease Paternal Grandmother 40     Heart Disease Paternal Grandfather      Cerebrovascular Disease Paternal Grandfather 40     Ovarian Cancer Maternal Aunt      Thyroid disease: Yes: mother.         DM2: Yes: MGF         Autoimmune: DM1, SLE, RA, Vitiligo No    Social Hx:         MEDICATIONS:  has a current medication list which includes the following prescription(s): amoxicillin-clavulanate, phentermine, and topiramate, and the following Facility-Administered Medications: bupivacaine 0.25 % - epinephrine 1:200,000 and ketorolac.    ROS     ROS: 10 point ROS neg other than the symptoms noted above in the HPI.      Physical Exam   VS: /78 (BP Location: Right arm, Patient Position: Chair, Cuff Size: Adult Large)  Pulse 82  Temp 97.7  F (36.5  C) (Oral)  Wt 112.9 kg (249 lb)  LMP 11/25/2018  Breastfeeding? No  BMI 39 kg/m2  GENERAL: NAD, well dressed, answering questions appropriately, appears stated age.  HEENT: no exopthalmous, no proptosis, no lig lag, no retraction, no scleral icterus  RESPIRATORY: Clear. Normal respiratory effort.  CARDIOVASCULAR: RRR. No peripheral edema.  EXTREMITIES: no edema  NEUROLOGY: CN grossly intact, no tremors  MSK: grossly intact. Normal gait and station.  PSYCH: Intact judgment and insight. A&OX3 with a cordial affect.    LABS:  Component    Latest Ref Rng & Units 1/31/2017   TSH    0.40 - 4.00 mU/L 2.13   T4 Free    0.76 - 1.46 ng/dL 1.02   Free T3    2.3 - 4.2 pg/mL 2.7   Thyroglobulin Antibody    <40 IU/mL <20   Thyroid Peroxidase Antibody    <35 IU/mL <10     !COMPREHENSIVE Latest Ref Rng & Units 1/31/2017   SODIUM 133 - 144 mmol/L 140   POTASSIUM 3.4 - 5.3 mmol/L 4.2   CHLORIDE 94 - 109 mmol/L 109   BUN 7 - 30 mg/dL 11   Creatinine 0.52 - 1.04 mg/dL 0.73   Glucose 70 - 99 mg/dL 93   ANION  "GAP 3 - 14 mmol/L 5   CALCIUM 8.5 - 10.1 mg/dL 8.8   ALBUMIN 3.4 - 5.0 g/dL 3.6     !LIPID/HEPATIC Latest Ref Rng & Units 4/9/2014 1/31/2017   CHOLESTEROL <200 mg/dL 231 (H) 161   TRIGLYCERIDES <150 mg/dL 167 (H) 100   HDL CHOLESTEROL >49 mg/dL 41 (L) 37 (L)   LDL CHOLESTEROL, CALCULATED <100 mg/dL 157 (H) 104 (H)   VLDL-CHOLESTEROL 0 - 30 mg/dL 33 (H)    NON HDL CHOLESTEROL <130 mg/dL  124   CHOLESTEROL/HDL RATIO 0.0 - 5.0 5.7 (H)    AST 0 - 45 U/L  31   ALT 0 - 50 U/L  59 (H)     Component    Latest Ref Rng & Units 2/5/2017   Saliva Collection Time     2300   Cortisol Saliva    ug/dL 0.057     Vital Signs 9/30/2015 11/18/2016 1/31/2017 4/12/2017   Weight (LB) 224 lb 265 lb 1 oz 258 lb 14.4 oz 249 lb 6.4 oz   Height  5' 7\" 5' 6.75\" 5' 6.75\"   BMI  41.6 40.94 39.35     Vital Signs 12/6/2017 2/20/2018 4/9/2018 5/2/2018   Weight (LB) 240 lb 4.8 oz 239 lb 243 lb 235 lb 9.6 oz   Height    5' 7\"   BMI (Calculated)    36.98     Vital Signs 8/31/2018 9/11/2018 10/17/2018 12/7/2018   Weight (LB) 236 lb 237 lb 244 lb 249 lb   Height 5' 7\" 5' 7\"     BMI (Calculated) 37.04 37.2  39     Waist Circumference: 51\" (3/9/17).  Waist circumference 46.75\" (4/12/2017).  47.75\" (7/26/2017).  44.25\" (12/6/2017). 42.75\" (5/2018).  47.5\" (today)    All pertinent notes, labs, and images personally reviewed by me.     A/P  Ms.Jonelle Weber is a 38 year old here for the evaluation of:    1. Class 2 obesity due to excess calories without serious comorbidity with body mass index (BMI) of 39.0 to 39.9 in adult    2. Morbid obesity due to excess calories (H)      I informed the pt that:  1.Weight loss medications can be taken to assist with weight reduction when combined with appropriate healthy lifestyle changes.  2.I discussed possible s/e, risks and benefits of weight loss medications.  3.All medications are FDA approved, however, some may be used ''off label'' for their weight loss benefits and some ''short term'' medications can be used " for longer periods to achieve desired clinical outcomes.  4.All patients taking weight loss medications must be seen in clinic for refill authorization.    Counseling exercise ( V65.41) - trying to exercise daily - now has more time, quit her second job  Dietary counseling( V65.3) - tracking intake using calorie tracking kendrick  Calculated BMI above the upper parameter and f/u plan was documented in the medical record.  Discussed indications, risks and benefits of all medications prescribed, and answered questions to patient's satisfaction.  Had lost 30 lbs since starting phentermine + diet and exercise efforts, 265-->249-->240-->235 lbs.  BMI has decreased 40.94-->37.92-->36.98.  Weight increased since last seen, 236-->249 lbs  Join weight watchers as planned.  Continue Phentermine 37.5 mg every day + increase topiramate to 50 mg bid.    Hyperlipidemia - Currently untreated.  Total and LDL cholesterol has improved compared with 2014.  I would expect some further improvement with diet and exercise efforts.  Will continue to monitor.    Labs ordered today:   No orders of the defined types were placed in this encounter.    Radiology/Consults ordered today: None    More than 50% of the time spent with Ms. Weber on counseling / coordinating her care.  Total face to face time was greater than or equal to 15 minutes.      Follow-up:  3 months    Nancy Goodson NP  Endocrinology  [facility]  CC: Carri Collins Ra     Again, thank you for allowing me to participate in the care of your patient.        Sincerely,        ALO Lei CNP

## 2019-03-07 ENCOUNTER — OFFICE VISIT (OUTPATIENT)
Dept: ENDOCRINOLOGY | Facility: CLINIC | Age: 39
End: 2019-03-07
Payer: COMMERCIAL

## 2019-03-07 DIAGNOSIS — E66.01 MORBID OBESITY DUE TO EXCESS CALORIES (H): ICD-10-CM

## 2019-03-07 PROCEDURE — 99213 OFFICE O/P EST LOW 20 MIN: CPT | Performed by: CLINICAL NURSE SPECIALIST

## 2019-03-07 RX ORDER — PHENTERMINE HYDROCHLORIDE 37.5 MG/1
37.5 TABLET ORAL
Qty: 32 TABLET | Refills: 2 | Status: SHIPPED | OUTPATIENT
Start: 2019-03-07 | End: 2019-06-14

## 2019-03-07 RX ORDER — TOPIRAMATE 50 MG/1
50 TABLET, FILM COATED ORAL 2 TIMES DAILY
Qty: 180 TABLET | Refills: 3 | Status: SHIPPED | OUTPATIENT
Start: 2019-03-07 | End: 2019-10-24

## 2019-03-07 NOTE — PROGRESS NOTES
"Name: Jocelyn Weber (Last seen 2018)  F/u For   Chief Complaint   Patient presents with     Weight Problem     Waist circ. 47.25\"     HPI:  Jocelyn Weber is a 38 year old female who presents for the management of steady weight gain over the years and inability to lose weight with effort.  She is concerned it may be due to thyroid disease.      The only time she was able to lose weight was in  while taking phentermine, was able to lose about 40 lbs.  FH is + for mother with hypothyroidism.  Recent labs were unremarkable, thyroid antibodies were not elevated, TSH was < 3 with normal T3 and T4 levels.    She has two young children.    She was interested in starting weight loss medication.  Phentermine was started 3/9/2017.   Topamax 25 mg bid added 2018.  Dose increased to 50 mg bid 2018.  Started a new job.    Plans to join weight wat[employer] at work - sponsored by the company.    Continues to tolerate phentermine well.  Topamax causing Pepsi to taste funny but otherwise tolerating well.    PMH/PSH:  Past Medical History:   Diagnosis Date     Obesity      Past Surgical History:   Procedure Laterality Date     BLOOD PATCH N/A 2015    Procedure: EPIDURAL BLOOD PATCH;  Surgeon: GENERIC ANESTHESIA PROVIDER;  Location: [facility]     BUNIONECTOMY  96      SECTION  3/12      SECTION N/A 2015    Procedure:  SECTION;  Surgeon: Becky Aceves MD;  Location: [facility] L+[facility]     HC TOOTH EXTRACTION W/FORCEP       TONSILLECTOMY       Family Hx:  Family History   Problem Relation Age of Onset     Family History Negative Mother      Other - See Comments Mother         hysterectomy d/t pre-cancer uterine polyps      Family History Negative Father      Other - See Comments Father         aortic disection      Heart Disease Maternal Grandmother 65        enlarged heart     Heart Disease Maternal Grandfather      Cerebrovascular Disease Maternal Grandfather      Heart Disease " Paternal Grandmother 40     Heart Disease Paternal Grandfather      Cerebrovascular Disease Paternal Grandfather 40     Ovarian Cancer Maternal Aunt      Thyroid disease: Yes: mother.         DM2: Yes: MGF         Autoimmune: DM1, SLE, RA, Vitiligo No    Social Hx:         MEDICATIONS:  has a current medication list which includes the following prescription(s): phentermine and topiramate, and the following Facility-Administered Medications: bupivacaine 0.25 % - epinephrine 1:200,000 and ketorolac.    ROS     ROS: 10 point ROS neg other than the symptoms noted above in the HPI.      Physical Exam   VS: There were no vitals taken for this visit.  GENERAL: NAD, well dressed, answering questions appropriately, appears stated age.  HEENT: no exopthalmous, no proptosis, no lig lag, no retraction, no scleral icterus  RESPIRATORY: Clear. Normal respiratory effort.  CARDIOVASCULAR: RRR. No peripheral edema.  EXTREMITIES: no edema  NEUROLOGY: CN grossly intact, no tremors  MSK: grossly intact. Normal gait and station.  PSYCH: Intact judgment and insight. A&OX3 with a cordial affect.    LABS:  Component    Latest Ref Rng & Units 1/31/2017   TSH    0.40 - 4.00 mU/L 2.13   T4 Free    0.76 - 1.46 ng/dL 1.02   Free T3    2.3 - 4.2 pg/mL 2.7   Thyroglobulin Antibody    <40 IU/mL <20   Thyroid Peroxidase Antibody    <35 IU/mL <10     !COMPREHENSIVE Latest Ref Rng & Units 1/31/2017   SODIUM 133 - 144 mmol/L 140   POTASSIUM 3.4 - 5.3 mmol/L 4.2   CHLORIDE 94 - 109 mmol/L 109   BUN 7 - 30 mg/dL 11   Creatinine 0.52 - 1.04 mg/dL 0.73   Glucose 70 - 99 mg/dL 93   ANION GAP 3 - 14 mmol/L 5   CALCIUM 8.5 - 10.1 mg/dL 8.8   ALBUMIN 3.4 - 5.0 g/dL 3.6     !LIPID/HEPATIC Latest Ref Rng & Units 4/9/2014 1/31/2017   CHOLESTEROL <200 mg/dL 231 (H) 161   TRIGLYCERIDES <150 mg/dL 167 (H) 100   HDL CHOLESTEROL >49 mg/dL 41 (L) 37 (L)   LDL CHOLESTEROL, CALCULATED <100 mg/dL 157 (H) 104 (H)   VLDL-CHOLESTEROL 0 - 30 mg/dL 33 (H)    NON HDL  "CHOLESTEROL <130 mg/dL  124   CHOLESTEROL/HDL RATIO 0.0 - 5.0 5.7 (H)    AST 0 - 45 U/L  31   ALT 0 - 50 U/L  59 (H)     Component    Latest Ref Rng & Units 2/5/2017   Saliva Collection Time     2300   Cortisol Saliva    ug/dL 0.057     Vital Signs 9/30/2015 11/18/2016 1/31/2017 4/12/2017   Weight (LB) 224 lb 265 lb 1 oz 258 lb 14.4 oz 249 lb 6.4 oz   Height  5' 7\" 5' 6.75\" 5' 6.75\"   BMI  41.6 40.94 39.35     Vital Signs 12/6/2017 2/20/2018 4/9/2018 5/2/2018   Weight (LB) 240 lb 4.8 oz 239 lb 243 lb 235 lb 9.6 oz   Height    5' 7\"   BMI (Calculated)    36.98     Vital Signs 8/31/2018 9/11/2018 10/17/2018 12/7/2018   Weight (LB) 236 lb 237 lb 244 lb 249 lb   Height 5' 7\" 5' 7\"     BMI (Calculated) 37.04 37.2  39     Waist Circumference: 51\" (3/9/17).  Waist circumference 46.75\" (4/12/2017).  47.75\" (7/26/2017).  44.25\" (12/6/2017). 42.75\" (5/2018).  47.5\" (12/2018).  47.25\" (today)    All pertinent notes, labs, and images personally reviewed by me.     A/P  Ms.Jonelle Weber is a 38 year old here for the evaluation of:    1. Morbid obesity due to excess calories (H)      I informed the pt that:  1.Weight loss medications can be taken to assist with weight reduction when combined with appropriate healthy lifestyle changes.  2.I discussed possible s/e, risks and benefits of weight loss medications.  3.All medications are FDA approved, however, some may be used ''off label'' for their weight loss benefits and some ''short term'' medications can be used for longer periods to achieve desired clinical outcomes.  4.All patients taking weight loss medications must be seen in clinic for refill authorization.    Counseling exercise ( V65.41) - trying to exercise daily - now has more time, quit her second job  Dietary counseling( V65.3) - tracking intake using calorie tracking kendrick  Calculated BMI above the upper parameter and f/u plan was documented in the medical record.  Discussed indications, risks and benefits of all " medications prescribed, and answered questions to patient's satisfaction.  Had lost 30 lbs since starting phentermine + diet and exercise efforts, 265-->249-->240-->235 lbs.  BMI has decreased 40.94-->37.92-->36.98.  Weight increased, 236-->249 lbs, has lost one pound since last seen -->248.  Usually misses pm dose of topamax  Continue Phentermine 37.5 mg every day + consistently take topiramate to 50 mg bid.  Continue intermittent fasting    Hyperlipidemia - Currently untreated.  Total and LDL cholesterol has improved compared with 2014.  I would expect some further improvement with diet and exercise efforts.  Will continue to monitor.    Labs ordered today:   No orders of the defined types were placed in this encounter.    Radiology/Consults ordered today: None    More than 50% of the time spent with Ms. Weber on counseling / coordinating her care.  Total face to face time was greater than or equal to 15 minutes.      Follow-up:  3 months    Nancy Goodson NP  Endocrinology  [facility]  CC: Carri Collins Ra

## 2019-03-07 NOTE — LETTER
"    3/7/2019         RE: Jocelyn Weber  [address] [place] [address]        Dear Colleague,    Thank you for referring your patient, Jocelyn Weber, to the [facility]. Please see a copy of my visit note below.    Name: Jocelyn Weber (Last seen 2018)  F/u For   Chief Complaint   Patient presents with     Weight Problem     Waist circ. 47.25\"     HPI:  Jocelyn Weber is a 38 year old female who presents for the management of steady weight gain over the years and inability to lose weight with effort.  She is concerned it may be due to thyroid disease.      The only time she was able to lose weight was in  while taking phentermine, was able to lose about 40 lbs.  FH is + for mother with hypothyroidism.  Recent labs were unremarkable, thyroid antibodies were not elevated, TSH was < 3 with normal T3 and T4 levels.    She has two young children.    She was interested in starting weight loss medication.  Phentermine was started 3/9/2017.   Topamax 25 mg bid added 2018.  Dose increased to 50 mg bid 2018.  Started a new job.    Plans to join weight wat[employer] at work - sponsored by the company.    Continues to tolerate phentermine well.  Topamax causing Pepsi to taste funny but otherwise tolerating well.    PMH/PSH:  Past Medical History:   Diagnosis Date     Obesity      Past Surgical History:   Procedure Laterality Date     BLOOD PATCH N/A 2015    Procedure: EPIDURAL BLOOD PATCH;  Surgeon: GENERIC ANESTHESIA PROVIDER;  [facility]: [facility]     BUNIONECTOMY  96      SECTION  3/12      SECTION N/A 2015    Procedure:  SECTION;  Surgeon: Becky Aceves MD;  [facility] L[facility]     HC TOOTH EXTRACTION W/FORCEP       TONSILLECTOMY       Family Hx:  Family History   Problem Relation Age of Onset     Family History Negative Mother      Other - See Comments Mother         hysterectomy d/t pre-cancer uterine polyps      Family " History Negative Father      Other - See Comments Father         aortic disection      Heart Disease Maternal Grandmother 65        enlarged heart     Heart Disease Maternal Grandfather      Cerebrovascular Disease Maternal Grandfather      Heart Disease Paternal Grandmother 40     Heart Disease Paternal Grandfather      Cerebrovascular Disease Paternal Grandfather 40     Ovarian Cancer Maternal Aunt      Thyroid disease: Yes: mother.         DM2: Yes: MGF         Autoimmune: DM1, SLE, RA, Vitiligo No    Social Hx:         MEDICATIONS:  has a current medication list which includes the following prescription(s): phentermine and topiramate, and the following Facility-Administered Medications: bupivacaine 0.25 % - epinephrine 1:200,000 and ketorolac.    ROS     ROS: 10 point ROS neg other than the symptoms noted above in the HPI.      Physical Exam   VS: There were no vitals taken for this visit.  GENERAL: NAD, well dressed, answering questions appropriately, appears stated age.  HEENT: no exopthalmous, no proptosis, no lig lag, no retraction, no scleral icterus  RESPIRATORY: Clear. Normal respiratory effort.  CARDIOVASCULAR: RRR. No peripheral edema.  EXTREMITIES: no edema  NEUROLOGY: CN grossly intact, no tremors  MSK: grossly intact. Normal gait and station.  PSYCH: Intact judgment and insight. A&OX3 with a cordial affect.    LABS:  Component    Latest Ref Rng & Units 1/31/2017   TSH    0.40 - 4.00 mU/L 2.13   T4 Free    0.76 - 1.46 ng/dL 1.02   Free T3    2.3 - 4.2 pg/mL 2.7   Thyroglobulin Antibody    <40 IU/mL <20   Thyroid Peroxidase Antibody    <35 IU/mL <10     !COMPREHENSIVE Latest Ref Rng & Units 1/31/2017   SODIUM 133 - 144 mmol/L 140   POTASSIUM 3.4 - 5.3 mmol/L 4.2   CHLORIDE 94 - 109 mmol/L 109   BUN 7 - 30 mg/dL 11   Creatinine 0.52 - 1.04 mg/dL 0.73   Glucose 70 - 99 mg/dL 93   ANION GAP 3 - 14 mmol/L 5   CALCIUM 8.5 - 10.1 mg/dL 8.8   ALBUMIN 3.4 - 5.0 g/dL 3.6     !LIPID/HEPATIC Latest Ref Rng &  "Units 4/9/2014 1/31/2017   CHOLESTEROL <200 mg/dL 231 (H) 161   TRIGLYCERIDES <150 mg/dL 167 (H) 100   HDL CHOLESTEROL >49 mg/dL 41 (L) 37 (L)   LDL CHOLESTEROL, CALCULATED <100 mg/dL 157 (H) 104 (H)   VLDL-CHOLESTEROL 0 - 30 mg/dL 33 (H)    NON HDL CHOLESTEROL <130 mg/dL  124   CHOLESTEROL/HDL RATIO 0.0 - 5.0 5.7 (H)    AST 0 - 45 U/L  31   ALT 0 - 50 U/L  59 (H)     Component    Latest Ref Rng & Units 2/5/2017   Saliva Collection Time     2300   Cortisol Saliva    ug/dL 0.057     Vital Signs 9/30/2015 11/18/2016 1/31/2017 4/12/2017   Weight (LB) 224 lb 265 lb 1 oz 258 lb 14.4 oz 249 lb 6.4 oz   Height  5' 7\" 5' 6.75\" 5' 6.75\"   BMI  41.6 40.94 39.35     Vital Signs 12/6/2017 2/20/2018 4/9/2018 5/2/2018   Weight (LB) 240 lb 4.8 oz 239 lb 243 lb 235 lb 9.6 oz   Height    5' 7\"   BMI (Calculated)    36.98     Vital Signs 8/31/2018 9/11/2018 10/17/2018 12/7/2018   Weight (LB) 236 lb 237 lb 244 lb 249 lb   Height 5' 7\" 5' 7\"     BMI (Calculated) 37.04 37.2  39     Waist Circumference: 51\" (3/9/17).  Waist circumference 46.75\" (4/12/2017).  47.75\" (7/26/2017).  44.25\" (12/6/2017). 42.75\" (5/2018).  47.5\" (12/2018).  47.25\" (today)    All pertinent notes, labs, and images personally reviewed by me.     A/P  Ms.Jonelle Weber is a 38 year old here for the evaluation of:    1. Morbid obesity due to excess calories (H)      I informed the pt that:  1.Weight loss medications can be taken to assist with weight reduction when combined with appropriate healthy lifestyle changes.  2.I discussed possible s/e, risks and benefits of weight loss medications.  3.All medications are FDA approved, however, some may be used ''off label'' for their weight loss benefits and some ''short term'' medications can be used for longer periods to achieve desired clinical outcomes.  4.All patients taking weight loss medications must be seen in clinic for refill authorization.    Counseling exercise ( V65.41) - trying to exercise daily - now " has more time, quit her second job  Dietary counseling( V65.3) - tracking intake using calorie tracking kendrick  Calculated BMI above the upper parameter and f/u plan was documented in the medical record.  Discussed indications, risks and benefits of all medications prescribed, and answered questions to patient's satisfaction.  Had lost 30 lbs since starting phentermine + diet and exercise efforts, 265-->249-->240-->235 lbs.  BMI has decreased 40.94-->37.92-->36.98.  Weight increased, 236-->249 lbs, has lost one pound since last seen -->248.  Usually misses pm dose of topamax  Continue Phentermine 37.5 mg every day + consistently take topiramate to 50 mg bid.  Continue intermittent fasting    Hyperlipidemia - Currently untreated.  Total and LDL cholesterol has improved compared with 2014.  I would expect some further improvement with diet and exercise efforts.  Will continue to monitor.    Labs ordered today:   No orders of the defined types were placed in this encounter.    Radiology/Consults ordered today: None    More than 50% of the time spent with Ms. Weber on counseling / coordinating her care.  Total face to face time was greater than or equal to 15 minutes.      Follow-up:  3 months    Nancy Goodson NP  Endocrinology  [facility]  CC: Carri Collins Ra     Again, thank you for allowing me to participate in the care of your patient.        Sincerely,        ALO Lei CNP

## 2019-06-02 ENCOUNTER — MYC MEDICAL ADVICE (OUTPATIENT)
Dept: ENDOCRINOLOGY | Facility: CLINIC | Age: 39
End: 2019-06-02

## 2019-06-02 DIAGNOSIS — E66.01 MORBID OBESITY DUE TO EXCESS CALORIES (H): ICD-10-CM

## 2019-06-10 NOTE — TELEPHONE ENCOUNTER
Last appointment 3/7/19, patient advised to follow up in three months. Please advise if patient can be worked in sooner.

## 2019-06-13 NOTE — TELEPHONE ENCOUNTER
I called and left a voicemail for patient to call back for 6/14/19 at 2 PM or 7/11/19 at 7 AM opening. Will await patient call back.    Rosalie Beck, CMA

## 2019-06-13 NOTE — TELEPHONE ENCOUNTER
Cornelia,  Can you see where we might work her in.  Route back to me for phentermine refill to last until her appointment.  Thanks,  Nancy Goodson NP  Endocrinology

## 2019-06-13 NOTE — TELEPHONE ENCOUNTER
Patient replied on MyChart and is scheduled for 7/11 at 7 AM. Routing back to provider for phentermine refill.    Rosalie Beck, CMA

## 2019-06-14 RX ORDER — PHENTERMINE HYDROCHLORIDE 37.5 MG/1
37.5 TABLET ORAL
Qty: 32 TABLET | Refills: 0 | Status: SHIPPED | OUTPATIENT
Start: 2019-06-14 | End: 2019-07-11

## 2019-06-14 NOTE — TELEPHONE ENCOUNTER
Please call patient and let her know phentermine Rx has been faxed to her pharmacy.  Nancy Goodson NP  Endocrinology

## 2019-07-11 ENCOUNTER — OFFICE VISIT (OUTPATIENT)
Dept: ENDOCRINOLOGY | Facility: CLINIC | Age: 39
End: 2019-07-11
Payer: COMMERCIAL

## 2019-07-11 VITALS
HEART RATE: 59 BPM | TEMPERATURE: 97.9 F | DIASTOLIC BLOOD PRESSURE: 75 MMHG | WEIGHT: 249 LBS | BODY MASS INDEX: 39 KG/M2 | RESPIRATION RATE: 12 BRPM | SYSTOLIC BLOOD PRESSURE: 116 MMHG

## 2019-07-11 DIAGNOSIS — E66.01 MORBID OBESITY DUE TO EXCESS CALORIES (H): ICD-10-CM

## 2019-07-11 DIAGNOSIS — E78.5 HYPERLIPIDEMIA LDL GOAL <130: ICD-10-CM

## 2019-07-11 DIAGNOSIS — E66.09 CLASS 2 OBESITY DUE TO EXCESS CALORIES WITHOUT SERIOUS COMORBIDITY WITH BODY MASS INDEX (BMI) OF 39.0 TO 39.9 IN ADULT: Primary | ICD-10-CM

## 2019-07-11 DIAGNOSIS — E66.01 MORBID OBESITY (H): ICD-10-CM

## 2019-07-11 DIAGNOSIS — Z13.6 CARDIOVASCULAR SCREENING; LDL GOAL LESS THAN 160: ICD-10-CM

## 2019-07-11 DIAGNOSIS — E66.812 CLASS 2 OBESITY DUE TO EXCESS CALORIES WITHOUT SERIOUS COMORBIDITY WITH BODY MASS INDEX (BMI) OF 39.0 TO 39.9 IN ADULT: Primary | ICD-10-CM

## 2019-07-11 LAB
ALBUMIN SERPL-MCNC: 3.7 G/DL (ref 3.4–5)
ALP SERPL-CCNC: 78 U/L (ref 40–150)
ALT SERPL W P-5'-P-CCNC: 50 U/L (ref 0–50)
ANION GAP SERPL CALCULATED.3IONS-SCNC: 7 MMOL/L (ref 3–14)
AST SERPL W P-5'-P-CCNC: 29 U/L (ref 0–45)
BILIRUB SERPL-MCNC: 0.4 MG/DL (ref 0.2–1.3)
BUN SERPL-MCNC: 12 MG/DL (ref 7–30)
CALCIUM SERPL-MCNC: 8.8 MG/DL (ref 8.5–10.1)
CHLORIDE SERPL-SCNC: 106 MMOL/L (ref 94–109)
CHOLEST SERPL-MCNC: 243 MG/DL
CO2 SERPL-SCNC: 27 MMOL/L (ref 20–32)
CREAT SERPL-MCNC: 0.76 MG/DL (ref 0.52–1.04)
GFR SERPL CREATININE-BSD FRML MDRD: >90 ML/MIN/{1.73_M2}
GLUCOSE SERPL-MCNC: 92 MG/DL (ref 70–99)
HDLC SERPL-MCNC: 45 MG/DL
LDLC SERPL CALC-MCNC: 170 MG/DL
NONHDLC SERPL-MCNC: 198 MG/DL
POTASSIUM SERPL-SCNC: 4.3 MMOL/L (ref 3.4–5.3)
PROT SERPL-MCNC: 7.1 G/DL (ref 6.8–8.8)
SODIUM SERPL-SCNC: 140 MMOL/L (ref 133–144)
TRIGL SERPL-MCNC: 142 MG/DL
TSH SERPL DL<=0.005 MIU/L-ACNC: 2.15 MU/L (ref 0.4–4)

## 2019-07-11 PROCEDURE — 84443 ASSAY THYROID STIM HORMONE: CPT | Performed by: CLINICAL NURSE SPECIALIST

## 2019-07-11 PROCEDURE — 80061 LIPID PANEL: CPT | Performed by: CLINICAL NURSE SPECIALIST

## 2019-07-11 PROCEDURE — 99213 OFFICE O/P EST LOW 20 MIN: CPT | Performed by: CLINICAL NURSE SPECIALIST

## 2019-07-11 PROCEDURE — 36415 COLL VENOUS BLD VENIPUNCTURE: CPT | Performed by: CLINICAL NURSE SPECIALIST

## 2019-07-11 PROCEDURE — 80053 COMPREHEN METABOLIC PANEL: CPT | Performed by: CLINICAL NURSE SPECIALIST

## 2019-07-11 RX ORDER — PHENTERMINE HYDROCHLORIDE 37.5 MG/1
37.5 TABLET ORAL
Qty: 32 TABLET | Refills: 3 | Status: SHIPPED | OUTPATIENT
Start: 2019-07-11 | End: 2019-10-24

## 2019-07-11 NOTE — PATIENT INSTRUCTIONS
"Waist circ. 49.75\"    Read the book The Obesity Code    The affiliated website: [email or web address]  "

## 2019-07-11 NOTE — PROGRESS NOTES
"Name: Jocelyn Weber (Last seen 3/7/2019)  F/u For   Chief Complaint   Patient presents with     Weight Problem     Waist circ. 49.75\"     HPI:  Jocelyn Weber is a 39 year old female who presents for the management of Obesity.  Has noted a steady weight gain over the years and inability to lose weight with effort.      The only time she was able to lose weight was in  while taking phentermine, was able to lose about 40 lbs.  FH is + for mother with hypothyroidism.  Previous labs were unremarkable, thyroid antibodies were not elevated, TSH was < 3 with normal T3 and T4 levels.    She has two young children.    She was interested in starting weight loss medication.  Phentermine was started 3/9/2017.   Topamax 25 mg bid added 2018.  Dose increased to 50 mg bid 2018.  Started a new job.    Joined weight [employer] at work - sponsored by the company.    Continues to tolerate phentermine well.  Topamax causing Pepsi to taste funny but otherwise tolerating well.    PMH/PSH:  Past Medical History:   Diagnosis Date     Obesity      Past Surgical History:   Procedure Laterality Date     BLOOD PATCH N/A 2015    Procedure: EPIDURAL BLOOD PATCH;  Surgeon: GENERIC ANESTHESIA PROVIDER;  Location: [facility]     BUNIONECTOMY  96      SECTION  3/12      SECTION N/A 2015    Procedure:  SECTION;  Surgeon: Becky Aceves MD;  [facility] L[facility]     HC TOOTH EXTRACTION W/FORCEP       TONSILLECTOMY       Family Hx:  Family History   Problem Relation Age of Onset     Family History Negative Mother      Other - See Comments Mother         hysterectomy d/t pre-cancer uterine polyps      Family History Negative Father      Other - See Comments Father         aortic disection      Heart Disease Maternal Grandmother 65        enlarged heart     Heart Disease Maternal Grandfather      Cerebrovascular Disease Maternal Grandfather      Heart Disease Paternal Grandmother 40     Heart " Disease Paternal Grandfather      Cerebrovascular Disease Paternal Grandfather 40     Ovarian Cancer Maternal Aunt      Thyroid disease: Yes: mother.         DM2: Yes: MGF         Autoimmune: DM1, SLE, RA, Vitiligo No    Social Hx:         MEDICATIONS:  has a current medication list which includes the following prescription(s): phentermine and topiramate.    ROS     ROS: 10 point ROS neg other than the symptoms noted above in the HPI.      Physical Exam   VS: /75 (BP Location: Right arm, Patient Position: Chair, Cuff Size: Adult Large)   Pulse 59   Temp 97.9  F (36.6  C) (Oral)   Resp 12   Wt 112.9 kg (249 lb)   Breastfeeding? No   BMI 39.00 kg/m    GENERAL: NAD, well dressed, answering questions appropriately, appears stated age.  HEENT: no exopthalmous, no proptosis, no lig lag, no retraction, no scleral icterus  RESPIRATORY: Clear. Normal respiratory effort.  CARDIOVASCULAR: RRR  EXTREMITIES: no edema  NEUROLOGY: CN grossly intact, no tremors  MSK: grossly intact. Normal gait and station.  PSYCH: Intact judgment and insight. A&OX3 with a cordial affect.    LABS:  Component    Latest Ref Rng & Units 1/31/2017   TSH    0.40 - 4.00 mU/L 2.13   T4 Free    0.76 - 1.46 ng/dL 1.02   Free T3    2.3 - 4.2 pg/mL 2.7   Thyroglobulin Antibody    <40 IU/mL <20   Thyroid Peroxidase Antibody    <35 IU/mL <10     !COMPREHENSIVE Latest Ref Rng & Units 1/31/2017   SODIUM 133 - 144 mmol/L 140   POTASSIUM 3.4 - 5.3 mmol/L 4.2   CHLORIDE 94 - 109 mmol/L 109   BUN 7 - 30 mg/dL 11   Creatinine 0.52 - 1.04 mg/dL 0.73   Glucose 70 - 99 mg/dL 93   ANION GAP 3 - 14 mmol/L 5   CALCIUM 8.5 - 10.1 mg/dL 8.8   ALBUMIN 3.4 - 5.0 g/dL 3.6     !LIPID/HEPATIC Latest Ref Rng & Units 4/9/2014 1/31/2017   CHOLESTEROL <200 mg/dL 231 (H) 161   TRIGLYCERIDES <150 mg/dL 167 (H) 100   HDL CHOLESTEROL >49 mg/dL 41 (L) 37 (L)   LDL CHOLESTEROL, CALCULATED <100 mg/dL 157 (H) 104 (H)   VLDL-CHOLESTEROL 0 - 30 mg/dL 33 (H)    NON HDL CHOLESTEROL  "<130 mg/dL  124   CHOLESTEROL/HDL RATIO 0.0 - 5.0 5.7 (H)    AST 0 - 45 U/L  31   ALT 0 - 50 U/L  59 (H)     Component    Latest Ref Rng & Units 2/5/2017   Saliva Collection Time     2300   Cortisol Saliva    ug/dL 0.057     Vital Signs 9/30/2015 11/18/2016 1/31/2017 4/12/2017   Weight (LB) 224 lb 265 lb 1 oz 258 lb 14.4 oz 249 lb 6.4 oz   Height  5' 7\" 5' 6.75\" 5' 6.75\"   BMI  41.6 40.94 39.35     Vital Signs 12/6/2017 2/20/2018 4/9/2018 5/2/2018   Weight (LB) 240 lb 4.8 oz 239 lb 243 lb 235 lb 9.6 oz   Height    5' 7\"   BMI (Calculated)    36.98     Vital Signs 8/31/2018 9/11/2018 10/17/2018 12/7/2018   Weight (LB) 236 lb 237 lb 244 lb 249 lb   Height 5' 7\" 5' 7\"     BMI (Calculated) 37.04 37.2  39     Vital Signs 7/11/2019   Weight (LB) 249 lb   Height    BMI (Calculated) 39.00     Waist Circumference: 51\" (3/9/17).  46.75\" (4/12/2017).  47.75\" (7/26/2017).  44.25\" (12/6/2017). 42.75\" (5/2018).  47.5\" (12/2018).  47.25\" (3/2019).  49.75\" (today)    All pertinent notes, labs, and images personally reviewed by me.     A/P  Ms.Jonelle Weber is a 39 year old here for the evaluation of:    1. Class 2 obesity due to excess calories without serious comorbidity with body mass index (BMI) of 39.0 to 39.9 in adult    2. Hyperlipidemia LDL goal <130    3. CARDIOVASCULAR SCREENING; LDL GOAL LESS THAN 160    4. Morbid obesity (H)      I informed the pt that:  1.Weight loss medications can be taken to assist with weight reduction when combined with appropriate healthy lifestyle changes.  2.I discussed possible s/e, risks and benefits of weight loss medications.  3.All medications are FDA approved, however, some may be used ''off label'' for their weight loss benefits and some ''short term'' medications can be used for longer periods to achieve desired clinical outcomes.  4.All patients taking weight loss medications must be seen in clinic for refill authorization.    Counseling exercise ( V65.41) - trying to exercise " daily - now has more time, quit her second job  Dietary counseling( V65.3) - tracking intake using calorie tracking kendrick  Calculated BMI above the upper parameter and f/u plan was documented in the medical record.  Discussed indications, risks and benefits of all medications prescribed, and answered questions to patient's satisfaction.    Had lost 30 lbs since starting phentermine + diet and exercise efforts, 265-->249-->240-->235 lbs.  BMI has decreased 40.94-->37.92-->36.98.  Weight increased, 236-->249 lbs, no weight loss or weight gain in the past 6 months.  Usually misses pm dose of topamax  Continue Phentermine 37.5 mg every day + consistently take topiramate to 50 mg bid.  Consider IDM program     Hyperlipidemia - Currently untreated.  Total and LDL cholesterol has improved compared with 2014.  I would expect some further improvement with diet and exercise efforts. Obtain fasting lipid panel today    Labs ordered today:   Orders Placed This Encounter   Procedures     Comprehensive metabolic panel     TSH with free T4 reflex     Lipid panel reflex to direct LDL Fasting     Radiology/Consults ordered today: None    More than 50% of the time spent with Ms. Weber on counseling / coordinating her care.  Total face to face time was greater than or equal to 15 minutes.      Follow-up:  3 months    Nancy Goodson NP  Endocrinology  [facility]  CC:

## 2019-07-11 NOTE — LETTER
"    2019         RE: Jocelyn Weber  [address] [facility] [address]        Dear Colleague,    Thank you for referring your patient, Jocelyn Weber, to the [facility]. Please see a copy of my visit note below.    Name: Jocelyn Weber (Last seen 3/7/2019)  F/u For   Chief Complaint   Patient presents with     Weight Problem     Waist circ. 49.75\"     HPI:  Jocelyn Weber is a 39 year old female who presents for the management of Obesity.  Has noted a steady weight gain over the years and inability to lose weight with effort.      The only time she was able to lose weight was in  while taking phentermine, was able to lose about 40 lbs.  FH is + for mother with hypothyroidism.  Previous labs were unremarkable, thyroid antibodies were not elevated, TSH was < 3 with normal T3 and T4 levels.    She has two young children.    She was interested in starting weight loss medication.  Phentermine was started 3/9/2017.   Topamax 25 mg bid added 2018.  Dose increased to 50 mg bid 2018.  Started a new job.    Joined [employer] at work - sponsored by the company.    Continues to tolerate phentermine well.  Topamax causing Pepsi to taste funny but otherwise tolerating well.    PMH/PSH:  Past Medical History:   Diagnosis Date     Obesity      Past Surgical History:   Procedure Laterality Date     BLOOD PATCH N/A 2015    Procedure: EPIDURAL BLOOD PATCH;  Surgeon: GENERIC ANESTHESIA PROVIDER;  [facility]: [facility]     BUNIONECTOMY  96      SECTION  3/12      SECTION N/A 2015    Procedure:  SECTION;  Surgeon: Becky Aceves MD;  [facility] L+D     HC TOOTH EXTRACTION W/FORCEP       TONSILLECTOMY       Family Hx:  Family History   Problem Relation Age of Onset     Family History Negative Mother      Other - See Comments Mother         hysterectomy d/t pre-cancer uterine polyps      Family History Negative Father      Other - See " Comments Father         aortic disection      Heart Disease Maternal Grandmother 65        enlarged heart     Heart Disease Maternal Grandfather      Cerebrovascular Disease Maternal Grandfather      Heart Disease Paternal Grandmother 40     Heart Disease Paternal Grandfather      Cerebrovascular Disease Paternal Grandfather 40     Ovarian Cancer Maternal Aunt      Thyroid disease: Yes: mother.         DM2: Yes: MGF         Autoimmune: DM1, SLE, RA, Vitiligo No    Social Hx:         MEDICATIONS:  has a current medication list which includes the following prescription(s): phentermine and topiramate.    ROS     ROS: 10 point ROS neg other than the symptoms noted above in the HPI.      Physical Exam   VS: /75 (BP Location: Right arm, Patient Position: Chair, Cuff Size: Adult Large)   Pulse 59   Temp 97.9  F (36.6  C) (Oral)   Resp 12   Wt 112.9 kg (249 lb)   Breastfeeding? No   BMI 39.00 kg/m     GENERAL: NAD, well dressed, answering questions appropriately, appears stated age.  HEENT: no exopthalmous, no proptosis, no lig lag, no retraction, no scleral icterus  RESPIRATORY: Clear. Normal respiratory effort.  CARDIOVASCULAR: RRR  EXTREMITIES: no edema  NEUROLOGY: CN grossly intact, no tremors  MSK: grossly intact. Normal gait and station.  PSYCH: Intact judgment and insight. A&OX3 with a cordial affect.    LABS:  Component    Latest Ref Rng & Units 1/31/2017   TSH    0.40 - 4.00 mU/L 2.13   T4 Free    0.76 - 1.46 ng/dL 1.02   Free T3    2.3 - 4.2 pg/mL 2.7   Thyroglobulin Antibody    <40 IU/mL <20   Thyroid Peroxidase Antibody    <35 IU/mL <10     !COMPREHENSIVE Latest Ref Rng & Units 1/31/2017   SODIUM 133 - 144 mmol/L 140   POTASSIUM 3.4 - 5.3 mmol/L 4.2   CHLORIDE 94 - 109 mmol/L 109   BUN 7 - 30 mg/dL 11   Creatinine 0.52 - 1.04 mg/dL 0.73   Glucose 70 - 99 mg/dL 93   ANION GAP 3 - 14 mmol/L 5   CALCIUM 8.5 - 10.1 mg/dL 8.8   ALBUMIN 3.4 - 5.0 g/dL 3.6     !LIPID/HEPATIC Latest Ref Rng & Units 4/9/2014  "1/31/2017   CHOLESTEROL <200 mg/dL 231 (H) 161   TRIGLYCERIDES <150 mg/dL 167 (H) 100   HDL CHOLESTEROL >49 mg/dL 41 (L) 37 (L)   LDL CHOLESTEROL, CALCULATED <100 mg/dL 157 (H) 104 (H)   VLDL-CHOLESTEROL 0 - 30 mg/dL 33 (H)    NON HDL CHOLESTEROL <130 mg/dL  124   CHOLESTEROL/HDL RATIO 0.0 - 5.0 5.7 (H)    AST 0 - 45 U/L  31   ALT 0 - 50 U/L  59 (H)     Component    Latest Ref Rng & Units 2/5/2017   Saliva Collection Time     2300   Cortisol Saliva    ug/dL 0.057     Vital Signs 9/30/2015 11/18/2016 1/31/2017 4/12/2017   Weight (LB) 224 lb 265 lb 1 oz 258 lb 14.4 oz 249 lb 6.4 oz   Height  5' 7\" 5' 6.75\" 5' 6.75\"   BMI  41.6 40.94 39.35     Vital Signs 12/6/2017 2/20/2018 4/9/2018 5/2/2018   Weight (LB) 240 lb 4.8 oz 239 lb 243 lb 235 lb 9.6 oz   Height    5' 7\"   BMI (Calculated)    36.98     Vital Signs 8/31/2018 9/11/2018 10/17/2018 12/7/2018   Weight (LB) 236 lb 237 lb 244 lb 249 lb   Height 5' 7\" 5' 7\"     BMI (Calculated) 37.04 37.2  39     Vital Signs 7/11/2019   Weight (LB) 249 lb   Height    BMI (Calculated) 39.00     Waist Circumference: 51\" (3/9/17).  46.75\" (4/12/2017).  47.75\" (7/26/2017).  44.25\" (12/6/2017). 42.75\" (5/2018).  47.5\" (12/2018).  47.25\" (3/2019).  49.75\" (today)    All pertinent notes, labs, and images personally reviewed by me.     A/P  Ms.Jonelle Weber is a 39 year old here for the evaluation of:    1. Class 2 obesity due to excess calories without serious comorbidity with body mass index (BMI) of 39.0 to 39.9 in adult    2. Hyperlipidemia LDL goal <130    3. CARDIOVASCULAR SCREENING; LDL GOAL LESS THAN 160    4. Morbid obesity (H)      I informed the pt that:  1.Weight loss medications can be taken to assist with weight reduction when combined with appropriate healthy lifestyle changes.  2.I discussed possible s/e, risks and benefits of weight loss medications.  3.All medications are FDA approved, however, some may be used ''off label'' for their weight loss benefits and some " ''short term'' medications can be used for longer periods to achieve desired clinical outcomes.  4.All patients taking weight loss medications must be seen in clinic for refill authorization.    Counseling exercise ( V65.41) - trying to exercise daily - now has more time, quit her second job  Dietary counseling( V65.3) - tracking intake using calorie tracking kendrick  Calculated BMI above the upper parameter and f/u plan was documented in the medical record.  Discussed indications, risks and benefits of all medications prescribed, and answered questions to patient's satisfaction.    Had lost 30 lbs since starting phentermine + diet and exercise efforts, 265-->249-->240-->235 lbs.  BMI has decreased 40.94-->37.92-->36.98.  Weight increased, 236-->249 lbs, no weight loss or weight gain in the past 6 months.  Usually misses pm dose of topamax  Continue Phentermine 37.5 mg every day + consistently take topiramate to 50 mg bid.  Consider IDM program     Hyperlipidemia - Currently untreated.  Total and LDL cholesterol has improved compared with 2014.  I would expect some further improvement with diet and exercise efforts. Obtain fasting lipid panel today    Labs ordered today:   Orders Placed This Encounter   Procedures     Comprehensive metabolic panel     TSH with free T4 reflex     Lipid panel reflex to direct LDL Fasting     Radiology/Consults ordered today: None    More than 50% of the time spent with Ms. Weber on counseling / coordinating her care.  Total face to face time was greater than or equal to 15 minutes.      Follow-up:  3 months    Nancy Goodson NP  Endocrinology  [facility]  CC:     Again, thank you for allowing me to participate in the care of your patient.        Sincerely,        ALO Lei CNP

## 2019-07-12 ENCOUNTER — MYC MEDICAL ADVICE (OUTPATIENT)
Dept: ENDOCRINOLOGY | Facility: CLINIC | Age: 39
End: 2019-07-12

## 2019-07-15 NOTE — RESULT ENCOUNTER NOTE
Jocelyn,  Your cholesterol numbers have increased compared to your last test. You can talk to your primary care provider to see if he or she recommends treatment.  Your liver and kidney function are normal.  Your thyroid test was normal.  Here's a copy of the results for your records.  Nancy Goodson NP  Endocrinology

## 2019-09-25 ENCOUNTER — ALLIED HEALTH/NURSE VISIT (OUTPATIENT)
Dept: NURSING | Facility: CLINIC | Age: 39
End: 2019-09-25
Payer: COMMERCIAL

## 2019-09-25 DIAGNOSIS — Z23 NEED FOR PROPHYLACTIC VACCINATION AND INOCULATION AGAINST INFLUENZA: Primary | ICD-10-CM

## 2019-09-25 PROCEDURE — 90686 IIV4 VACC NO PRSV 0.5 ML IM: CPT

## 2019-09-25 PROCEDURE — 90471 IMMUNIZATION ADMIN: CPT

## 2019-09-25 PROCEDURE — 99207 ZZC NO CHARGE NURSE ONLY: CPT

## 2019-10-24 ENCOUNTER — OFFICE VISIT (OUTPATIENT)
Dept: ENDOCRINOLOGY | Facility: CLINIC | Age: 39
End: 2019-10-24
Payer: COMMERCIAL

## 2019-10-24 VITALS
SYSTOLIC BLOOD PRESSURE: 106 MMHG | RESPIRATION RATE: 14 BRPM | DIASTOLIC BLOOD PRESSURE: 69 MMHG | WEIGHT: 243 LBS | BODY MASS INDEX: 38.06 KG/M2 | HEART RATE: 68 BPM

## 2019-10-24 DIAGNOSIS — E66.01 MORBID OBESITY DUE TO EXCESS CALORIES (H): ICD-10-CM

## 2019-10-24 PROCEDURE — 99213 OFFICE O/P EST LOW 20 MIN: CPT | Performed by: CLINICAL NURSE SPECIALIST

## 2019-10-24 RX ORDER — PHENTERMINE HYDROCHLORIDE 37.5 MG/1
37.5 TABLET ORAL
Qty: 32 TABLET | Refills: 3 | Status: SHIPPED | OUTPATIENT
Start: 2019-10-24 | End: 2020-02-04

## 2019-10-24 RX ORDER — TOPIRAMATE 50 MG/1
100 TABLET, FILM COATED ORAL 2 TIMES DAILY
Qty: 360 TABLET | Refills: 3 | Status: SHIPPED | OUTPATIENT
Start: 2019-10-24 | End: 2022-05-18

## 2019-10-24 NOTE — PROGRESS NOTES
Name: Jocelyn Weber (Last seen 2019)  F/u For   Chief Complaint   Patient presents with     Weight Problem     HPI:  Jocelyn Weber is a 39 year old female who presents for the management of Obesity.  Has noted a steady weight gain over the years and inability to lose weight with effort.      The only time she was able to lose weight was in  while taking phentermine, was able to lose about 40 lbs.  FH is + for mother with hypothyroidism.  Previous labs were unremarkable, thyroid antibodies were not elevated, TSH was < 3 with normal T3 and T4 levels.    She has two young children.    She was interested in starting weight loss medication.  Phentermine was started 3/9/2017.   Topamax 25 mg bid added 2018.  Dose increased to 50 mg bid 2018.  Started a new job after that.    Joined weight [employer] at work - sponsored by the company.    Continues to tolerate phentermine well.  Topamax causing [employer] to taste funny but otherwise tolerating well.    PMH/PSH:  Past Medical History:   Diagnosis Date     Obesity      Past Surgical History:   Procedure Laterality Date     BLOOD PATCH N/A 2015    Procedure: EPIDURAL BLOOD PATCH;  Surgeon: GENERIC ANESTHESIA PROVIDER;  [facility] OR     BUNIONECTOMY  96      SECTION  3/12      SECTION N/A 2015    Procedure:  SECTION;  Surgeon: Becky Aceves MD;  [facility] L+D     HC TOOTH EXTRACTION W/FORCEP       TONSILLECTOMY       Family Hx:  Family History   Problem Relation Age of Onset     Family History Negative Mother      Other - See Comments Mother         hysterectomy d/t pre-cancer uterine polyps      Family History Negative Father      Other - See Comments Father         aortic disection      Heart Disease Maternal Grandmother 65        enlarged heart     Heart Disease Maternal Grandfather      Cerebrovascular Disease Maternal Grandfather      Heart Disease Paternal Grandmother 40     Heart Disease Paternal  Grandfather      Cerebrovascular Disease Paternal Grandfather 40     Ovarian Cancer Maternal Aunt      Thyroid disease: Yes: mother.         DM2: Yes: MGF         Autoimmune: DM1, SLE, RA, Vitiligo No    Social Hx:         MEDICATIONS:  has a current medication list which includes the following prescription(s): phentermine and topiramate.    ROS     ROS: 10 point ROS neg other than the symptoms noted above in the HPI.      Physical Exam   VS: /69 (BP Location: Right arm, Patient Position: Chair, Cuff Size: Adult Large)   Pulse 68   Resp 14   Wt 110.2 kg (243 lb)   LMP 10/13/2019 (Approximate)   Breastfeeding? No   BMI 38.06 kg/m    GENERAL: NAD, well dressed, answering questions appropriately, appears stated age.  HEENT: no exopthalmous, no proptosis, no lig lag, no retraction, no scleral icterus  RESPIRATORY: Clear. Normal respiratory effort.  CARDIOVASCULAR: RRR  EXTREMITIES: no edema  NEUROLOGY: CN grossly intact, no tremors  MSK: grossly intact. Normal gait and station.  PSYCH: Intact judgment and insight. A&OX3 with a cordial affect.    LABS:  ENDO THYROID LABS-UMP Latest Ref Rng & Units 7/11/2019 1/31/2017   TSH 0.40 - 4.00 mU/L 2.15 2.13   T4 FREE 0.76 - 1.46 ng/dL  1.02   FREE T3 2.3 - 4.2 pg/mL  2.7   THYROGLOBULIN ANTIBODY <40 IU/mL  <20   THYR PEROXIDASE DANIEL <35 IU/mL  <10     !COMPREHENSIVE Latest Ref Rng & Units 7/11/2019   SODIUM 133 - 144 mmol/L 140   POTASSIUM 3.4 - 5.3 mmol/L 4.3   CHLORIDE 94 - 109 mmol/L 106   BUN 7 - 30 mg/dL 12   Creatinine 0.52 - 1.04 mg/dL 0.76   Glucose 70 - 99 mg/dL 92   ANION GAP 3 - 14 mmol/L 7   CALCIUM 8.5 - 10.1 mg/dL 8.8   ALBUMIN 3.4 - 5.0 g/dL 3.7   PROTEIN, TOTAL 6.8 - 8.8 g/dL 7.1     !LIPID/HEPATIC Latest Ref Rng & Units 7/11/2019   CHOLESTEROL <200 mg/dL 243 (H)   TRIGLYCERIDES <150 mg/dL 142   HDL CHOLESTEROL >49 mg/dL 45 (L)   LDL CHOLESTEROL, CALCULATED <100 mg/dL 170 (H)   VLDL-CHOLESTEROL 0 - 30 mg/dL    NON HDL CHOLESTEROL <130 mg/dL 198 (H)  "  CHOLESTEROL/HDL RATIO 0.0 - 5.0    AST 0 - 45 U/L 29   ALT 0 - 50 U/L 50     Component    Latest Ref Rng & Units 2/5/2017   Saliva Collection Time     2300   Cortisol Saliva    ug/dL 0.057     Vital Signs 9/30/2015 11/18/2016 1/31/2017 4/12/2017   Weight (LB) 224 lb 265 lb 1 oz 258 lb 14.4 oz 249 lb 6.4 oz   Height  5' 7\" 5' 6.75\" 5' 6.75\"   BMI  41.6 40.94 39.35     Vital Signs 12/6/2017 2/20/2018 4/9/2018 5/2/2018   Weight (LB) 240 lb 4.8 oz 239 lb 243 lb 235 lb 9.6 oz   Height    5' 7\"   BMI (Calculated)    36.98     Vital Signs 8/31/2018 9/11/2018 10/17/2018 12/7/2018   Weight (LB) 236 lb 237 lb 244 lb 249 lb   Height 5' 7\" 5' 7\"     BMI (Calculated) 37.04 37.2  39     Vital Signs 7/11/2019 10/24/2019   Weight (LB) 249 lb 243 lb   Height     BMI (Calculated)  38.06       Waist Circumference: 51\" (3/9/17).  46.75\" (4/12/2017).  47.75\" (7/26/2017).  44.25\" (12/6/2017). 42.75\" (5/2018).  47.5\" (12/2018).  47.25\" (3/2019).  49.75\" (7/2019). 44.5\" (today)    All pertinent notes, labs, and images personally reviewed by me.     A/P  Ms.Jonelle Weber is a 39 year old here for the management of:    No diagnosis found.  I informed the pt that:  1.Weight loss medications can be taken to assist with weight reduction when combined with appropriate healthy lifestyle changes.  2.I discussed possible s/e, risks and benefits of weight loss medications.  3.All medications are FDA approved, however, some may be used ''off label'' for their weight loss benefits and some ''short term'' medications can be used for longer periods to achieve desired clinical outcomes.  4.All patients taking weight loss medications must be seen in clinic for refill authorization.    Counseling exercise ( V65.41) - try to exercise daily  Dietary counseling( V65.3) - tracking intake using calorie tracking kendrick  Calculated BMI above the upper parameter and f/u plan was documented in the medical record.  Discussed indications, risks and benefits of " "all medications prescribed, and answered questions to patient's satisfaction.    Has lost 22 lbs since starting phentermine + diet and exercise efforts, 265-->249-->240-->243. lbs.  BMI has decreased 41-->38  Waist circumference has decreased 51\" --> 44\".  Weight decreased 6 lbs since last seen.  Continue Phentermine 37.5 mg every day + increase topiramate to 75 mg bid, then 100 mg bid if needed.  Weight loss goal 5% of current weight over the next 3 months = 4 lbs per month  Consider IDM program - diet recommendation handout provided.    Hyperlipidemia - Currently untreated.  Total and LDL cholesterol elevated.  I would expect some improvement with diet and exercise efforts.  Continue to monitor.    Labs ordered today:   No orders of the defined types were placed in this encounter.    Radiology/Consults ordered today: None    More than 50% of the time spent with Ms. Weber on counseling / coordinating her care - discussed management, treatment options, and goals of medical weight loss management in detail.  Total face to face time was greater than or equal to 15 minutes.      Follow-up:  3 months    Nancy Goodson NP  Endocrinology  [facility]  CC:   "

## 2019-10-24 NOTE — LETTER
10/24/2019         RE: Jocelyn Weber  [address] [facility] [address]        Dear Colleague,    Thank you for referring your patient, Jocelyn Weber, to the [facility]. Please see a copy of my visit note below.    Name: Jocelyn Weber (Last seen 2019)  F/u For   Chief Complaint   Patient presents with     Weight Problem     HPI:  Jocelyn Weber is a 39 year old female who presents for the management of Obesity.  Has noted a steady weight gain over the years and inability to lose weight with effort.      The only time she was able to lose weight was in  while taking phentermine, was able to lose about 40 lbs.  FH is + for mother with hypothyroidism.  Previous labs were unremarkable, thyroid antibodies were not elevated, TSH was < 3 with normal T3 and T4 levels.    She has two young children.    She was interested in starting weight loss medication.  Phentermine was started 3/9/2017.   Topamax 25 mg bid added 2018.  Dose increased to 50 mg bid 2018.  Started a new job after that.    Joined weight [employer] at work - sponsored by the company.    Continues to tolerate phentermine well.  Topamax causing [employer]epsi to taste funny but otherwise tolerating well.    PMH/PSH:  Past Medical History:   Diagnosis Date     Obesity      Past Surgical History:   Procedure Laterality Date     BLOOD PATCH N/A 2015    Procedure: EPIDURAL BLOOD PATCH;  Surgeon: GENERIC ANESTHESIA PROVIDER;  [facility]     BUNIONECTOMY  96      SECTION  3/12      SECTION N/A 2015    Procedure:  SECTION;  Surgeon: Becky Aceves MD;  [facility] L+D     HC TOOTH EXTRACTION W/FORCEP       TONSILLECTOMY       Family Hx:  Family History   Problem Relation Age of Onset     Family History Negative Mother      Other - See Comments Mother         hysterectomy d/t pre-cancer uterine polyps      Family History Negative Father      Other - See Comments  Father         aortic disection      Heart Disease Maternal Grandmother 65        enlarged heart     Heart Disease Maternal Grandfather      Cerebrovascular Disease Maternal Grandfather      Heart Disease Paternal Grandmother 40     Heart Disease Paternal Grandfather      Cerebrovascular Disease Paternal Grandfather 40     Ovarian Cancer Maternal Aunt      Thyroid disease: Yes: mother.         DM2: Yes: MGF         Autoimmune: DM1, SLE, RA, Vitiligo No    Social Hx:         MEDICATIONS:  has a current medication list which includes the following prescription(s): phentermine and topiramate.    ROS     ROS: 10 point ROS neg other than the symptoms noted above in the HPI.      Physical Exam   VS: /69 (BP Location: Right arm, Patient Position: Chair, Cuff Size: Adult Large)   Pulse 68   Resp 14   Wt 110.2 kg (243 lb)   LMP 10/13/2019 (Approximate)   Breastfeeding? No   BMI 38.06 kg/m     GENERAL: NAD, well dressed, answering questions appropriately, appears stated age.  HEENT: no exopthalmous, no proptosis, no lig lag, no retraction, no scleral icterus  RESPIRATORY: Clear. Normal respiratory effort.  CARDIOVASCULAR: RRR  EXTREMITIES: no edema  NEUROLOGY: CN grossly intact, no tremors  MSK: grossly intact. Normal gait and station.  PSYCH: Intact judgment and insight. A&OX3 with a cordial affect.    LABS:  ENDO THYROID LABS-UMP Latest Ref Rng & Units 7/11/2019 1/31/2017   TSH 0.40 - 4.00 mU/L 2.15 2.13   T4 FREE 0.76 - 1.46 ng/dL  1.02   FREE T3 2.3 - 4.2 pg/mL  2.7   THYROGLOBULIN ANTIBODY <40 IU/mL  <20   THYR PEROXIDASE DANIEL <35 IU/mL  <10     !COMPREHENSIVE Latest Ref Rng & Units 7/11/2019   SODIUM 133 - 144 mmol/L 140   POTASSIUM 3.4 - 5.3 mmol/L 4.3   CHLORIDE 94 - 109 mmol/L 106   BUN 7 - 30 mg/dL 12   Creatinine 0.52 - 1.04 mg/dL 0.76   Glucose 70 - 99 mg/dL 92   ANION GAP 3 - 14 mmol/L 7   CALCIUM 8.5 - 10.1 mg/dL 8.8   ALBUMIN 3.4 - 5.0 g/dL 3.7   PROTEIN, TOTAL 6.8 - 8.8 g/dL 7.1     !LIPID/HEPATIC  "Latest Ref Rng & Units 7/11/2019   CHOLESTEROL <200 mg/dL 243 (H)   TRIGLYCERIDES <150 mg/dL 142   HDL CHOLESTEROL >49 mg/dL 45 (L)   LDL CHOLESTEROL, CALCULATED <100 mg/dL 170 (H)   VLDL-CHOLESTEROL 0 - 30 mg/dL    NON HDL CHOLESTEROL <130 mg/dL 198 (H)   CHOLESTEROL/HDL RATIO 0.0 - 5.0    AST 0 - 45 U/L 29   ALT 0 - 50 U/L 50     Component    Latest Ref Rng & Units 2/5/2017   Saliva Collection Time     2300   Cortisol Saliva    ug/dL 0.057     Vital Signs 9/30/2015 11/18/2016 1/31/2017 4/12/2017   Weight (LB) 224 lb 265 lb 1 oz 258 lb 14.4 oz 249 lb 6.4 oz   Height  5' 7\" 5' 6.75\" 5' 6.75\"   BMI  41.6 40.94 39.35     Vital Signs 12/6/2017 2/20/2018 4/9/2018 5/2/2018   Weight (LB) 240 lb 4.8 oz 239 lb 243 lb 235 lb 9.6 oz   Height    5' 7\"   BMI (Calculated)    36.98     Vital Signs 8/31/2018 9/11/2018 10/17/2018 12/7/2018   Weight (LB) 236 lb 237 lb 244 lb 249 lb   Height 5' 7\" 5' 7\"     BMI (Calculated) 37.04 37.2  39     Vital Signs 7/11/2019 10/24/2019   Weight (LB) 249 lb 243 lb   Height     BMI (Calculated)  38.06       Waist Circumference: 51\" (3/9/17).  46.75\" (4/12/2017).  47.75\" (7/26/2017).  44.25\" (12/6/2017). 42.75\" (5/2018).  47.5\" (12/2018).  47.25\" (3/2019).  49.75\" (7/2019). 44.5\" (today)    All pertinent notes, labs, and images personally reviewed by me.     A/P  Ms.Jonelle Weber is a 39 year old here for the management of:    No diagnosis found.  I informed the pt that:  1.Weight loss medications can be taken to assist with weight reduction when combined with appropriate healthy lifestyle changes.  2.I discussed possible s/e, risks and benefits of weight loss medications.  3.All medications are FDA approved, however, some may be used ''off label'' for their weight loss benefits and some ''short term'' medications can be used for longer periods to achieve desired clinical outcomes.  4.All patients taking weight loss medications must be seen in clinic for refill authorization.    Counseling " "exercise ( V65.41) - try to exercise daily  Dietary counseling( V65.3) - tracking intake using calorie tracking kendrick  Calculated BMI above the upper parameter and f/u plan was documented in the medical record.  Discussed indications, risks and benefits of all medications prescribed, and answered questions to patient's satisfaction.    Has lost 22 lbs since starting phentermine + diet and exercise efforts, 265-->249-->240-->243. lbs.  BMI has decreased 41-->38  Waist circumference has decreased 51\" --> 44\".  Weight decreased 6 lbs since last seen.  Continue Phentermine 37.5 mg every day + increase topiramate to 75 mg bid, then 100 mg bid if needed.  Weight loss goal 5% of current weight over the next 3 months = 4 lbs per month  Consider IDM program - diet recommendation handout provided.    Hyperlipidemia - Currently untreated.  Total and LDL cholesterol elevated.  I would expect some improvement with diet and exercise efforts.  Continue to monitor.    Labs ordered today:   No orders of the defined types were placed in this encounter.    Radiology/Consults ordered today: None    More than 50% of the time spent with Ms. Weber on counseling / coordinating her care - discussed management, treatment options, and goals of medical weight loss management in detail.  Total face to face time was greater than or equal to 15 minutes.      Follow-up:  3 months    Nancy Goodson NP  Endocrinology  [facility]  CC:     Again, thank you for allowing me to participate in the care of your patient.        Sincerely,        ALO Lei CNP    "

## 2019-10-24 NOTE — PATIENT INSTRUCTIONS
"Waist circ. 44.5\"    You have lost a total of 22 lbs , 265 lbs --> 243 lbs  You have lost 6 lbs since last seen in July.  You have lost 6.5 \" from your waist.    Continue working on low carb diet and intermittent fasting.    Follow up again in January as scheduled.    Nancy Goodson NP  Endocrinology    "

## 2020-02-04 ENCOUNTER — OFFICE VISIT (OUTPATIENT)
Dept: ENDOCRINOLOGY | Facility: CLINIC | Age: 40
End: 2020-02-04
Payer: COMMERCIAL

## 2020-02-04 VITALS
DIASTOLIC BLOOD PRESSURE: 73 MMHG | HEART RATE: 54 BPM | BODY MASS INDEX: 39.35 KG/M2 | SYSTOLIC BLOOD PRESSURE: 122 MMHG | RESPIRATION RATE: 14 BRPM | WEIGHT: 250.7 LBS | HEIGHT: 67 IN

## 2020-02-04 DIAGNOSIS — E66.01 MORBID OBESITY DUE TO EXCESS CALORIES (H): ICD-10-CM

## 2020-02-04 PROCEDURE — 99213 OFFICE O/P EST LOW 20 MIN: CPT | Performed by: CLINICAL NURSE SPECIALIST

## 2020-02-04 RX ORDER — PHENTERMINE HYDROCHLORIDE 37.5 MG/1
37.5 TABLET ORAL
Qty: 32 TABLET | Refills: 3 | Status: SHIPPED | OUTPATIENT
Start: 2020-02-04 | End: 2022-05-18

## 2020-02-04 ASSESSMENT — MIFFLIN-ST. JEOR: SCORE: 1850.83

## 2020-02-04 NOTE — LETTER
"    2020         RE: Jocelyn Weber  [address]  [facility] [address]        Dear Colleague,    Thank you for referring your patient, Jocelyn Weber, to the [facility]. Please see a copy of my visit note below.    Name: Jocelyn Weber (Last seen 10/24/2019)  F/u For   Chief Complaint   Patient presents with     Weight Problem     Waist circ. 45.25\"     HPI:  Jocelyn Weber is a 39 year old female who presents for the management of Obesity.  Has noted a steady weight gain over the years and inability to lose weight with effort.      The only time she was able to lose weight was in  while taking phentermine, was able to lose about 40 lbs.  FH is + for mother with hypothyroidism.  Previous labs were unremarkable, thyroid antibodies were not elevated, TSH was < 3 with normal T3 and T4 levels.    She has two young children.    She was interested in starting weight loss medication.  Phentermine was started 3/9/2017.   Topamax 25 mg bid added 2018.  Dose increased to 50 mg bid 2018.  Frequently misses pm dose of Topamax.    Continues to tolerate phentermine well.  Topamax causing Pepsi to taste funny but otherwise tolerating well.    Wasn't watching her diet over the holidays.  Lost her job.  Has a final interview for a new job today.  Between the holiday's and stress of losing her job, she was not really following her meal plan.  Now motivated to resume diet and exercise efforts.    PMH/PSH:  Past Medical History:   Diagnosis Date     Obesity      Past Surgical History:   Procedure Laterality Date     BLOOD PATCH N/A 2015    Procedure: EPIDURAL BLOOD PATCH;  Surgeon: GENERIC ANESTHESIA PROVIDER;  [facility]: [facility] OR     BUNIONECTOMY  96      SECTION  3/12      SECTION N/A 2015    Procedure:  SECTION;  Surgeon: Becky Aceves MD;  [facility] L+D     HC TOOTH EXTRACTION W/FORCEP       TONSILLECTOMY       Family Hx:  Family " "History   Problem Relation Age of Onset     Family History Negative Mother      Other - See Comments Mother         hysterectomy d/t pre-cancer uterine polyps      Family History Negative Father      Other - See Comments Father         aortic disection      Heart Disease Maternal Grandmother 65        enlarged heart     Heart Disease Maternal Grandfather      Cerebrovascular Disease Maternal Grandfather      Heart Disease Paternal Grandmother 40     Heart Disease Paternal Grandfather      Cerebrovascular Disease Paternal Grandfather 40     Ovarian Cancer Maternal Aunt      Thyroid disease: Yes: mother.         DM2: Yes: MGF         Autoimmune: DM1, SLE, RA, Vitiligo No    Social Hx:         MEDICATIONS:  has a current medication list which includes the following prescription(s): phentermine and topiramate.    ROS     ROS: 10 point ROS neg other than the symptoms noted above in the HPI.      Physical Exam   VS: /73 (BP Location: Right arm, Patient Position: Chair, Cuff Size: Adult Large)   Pulse 54   Resp 14   Ht 1.695 m (5' 6.75\")   Wt 113.7 kg (250 lb 11.2 oz)   LMP 01/10/2020 (Exact Date)   Breastfeeding No   BMI 39.56 kg/m     GENERAL: NAD, well dressed, answering questions appropriately, appears stated age.  HEENT: no exopthalmous, no proptosis, no lig lag, no retraction, no scleral icterus  RESPIRATORY: Clear. Normal respiratory effort.  CARDIOVASCULAR: RRR  EXTREMITIES: no edema  NEUROLOGY: CN grossly intact, no tremors  MSK: grossly intact. Normal gait and station.  PSYCH: Intact judgment and insight. A&OX3 with a cordial affect.    LABS:  ENDO THYROID LABS-UMP Latest Ref Rng & Units 7/11/2019 1/31/2017   TSH 0.40 - 4.00 mU/L 2.15 2.13   T4 FREE 0.76 - 1.46 ng/dL  1.02   FREE T3 2.3 - 4.2 pg/mL  2.7   THYROGLOBULIN ANTIBODY <40 IU/mL  <20   THYR PEROXIDASE DANIEL <35 IU/mL  <10     !COMPREHENSIVE Latest Ref Rng & Units 7/11/2019   SODIUM 133 - 144 mmol/L 140   POTASSIUM 3.4 - 5.3 mmol/L 4.3 " "  CHLORIDE 94 - 109 mmol/L 106   BUN 7 - 30 mg/dL 12   Creatinine 0.52 - 1.04 mg/dL 0.76   Glucose 70 - 99 mg/dL 92   ANION GAP 3 - 14 mmol/L 7   CALCIUM 8.5 - 10.1 mg/dL 8.8   ALBUMIN 3.4 - 5.0 g/dL 3.7   PROTEIN, TOTAL 6.8 - 8.8 g/dL 7.1     !LIPID/HEPATIC Latest Ref Rng & Units 7/11/2019   CHOLESTEROL <200 mg/dL 243 (H)   TRIGLYCERIDES <150 mg/dL 142   HDL CHOLESTEROL >49 mg/dL 45 (L)   LDL CHOLESTEROL, CALCULATED <100 mg/dL 170 (H)   VLDL-CHOLESTEROL 0 - 30 mg/dL    NON HDL CHOLESTEROL <130 mg/dL 198 (H)   CHOLESTEROL/HDL RATIO 0.0 - 5.0    AST 0 - 45 U/L 29   ALT 0 - 50 U/L 50     Component    Latest Ref Rng & Units 2/5/2017   Saliva Collection Time     2300   Cortisol Saliva    ug/dL 0.057     Vital Signs 7/11/2019 10/24/2019 2/4/2020   Weight (LB) 249 lb 243 lb 250 lb 11.2 oz   Height   5' 6.75\"   BMI (Calculated)   39.56     Waist Circumference: 51\" (3/9/17).  46.75\" (4/12/2017).  47.75\" (7/26/2017).  44.25\" (12/6/2017). 42.75\" (5/2018).  47.5\" (12/2018).  47.25\" (3/2019).  49.75\" (7/2019). 45.25\" (today)    All pertinent notes, labs, and images personally reviewed by me.     A/P  Ms.Jonelle Weber is a 39 year old here for the management of:    1. Morbid obesity due to excess calories (H)    Current BMI 39-40.  Obesity related comorbidity:  Hyperlipidemia.    I informed the pt that:  1.Weight loss medications can be taken to assist with weight reduction when combined with appropriate healthy lifestyle changes.  2.I discussed possible s/e, risks and benefits of weight loss medications.  3.All medications are FDA approved, however, some may be used ''off label'' for their weight loss benefits and some ''short term'' medications can be used for longer periods to achieve desired clinical outcomes.  4.All patients taking weight loss medications must be seen in clinic for refill authorization.    Counseling exercise ( V65.41) - try to exercise daily  Dietary counseling( V65.3) - tracking intake using calorie " "tracking kendrick  Calculated BMI above the upper parameter and f/u plan was documented in the medical record.  Discussed indications, risks and benefits of all medications prescribed, and answered questions to patient's satisfaction.    Had lost 22 lbs since starting phentermine + diet and exercise efforts, 265-->249-->240-->243. Lbs.  Has gained 7# since last seen -->250 lbs, likely due to dietary indiscretions.  BMI has decreased 41-->39  Waist circumference has decreased 51\" --> 45\".      Feels motivated to resume diet and exercise efforts.  Continue Phentermine 37.5 mg every day + increase topiramate to 75 mg bid, then 100 once to twice daily as needed.  Weight loss goal 5% of current weight over the next 3 months = 5 lbs per month  Consider IDM program - diet recommendation handout provided.    Hyperlipidemia - Currently untreated.  Total and LDL cholesterol elevated.  I would expect some improvement with diet and exercise efforts.  Continue to monitor.    Labs ordered today:   No orders of the defined types were placed in this encounter.    Radiology/Consults ordered today: None    More than 50% of the time spent with Ms. Weber on counseling / coordinating her care discussing the above plan of care.The patient indicates understanding of the above issues and agrees with the plan set forth.  Total face to face time was 15 minutes..      Follow-up:  3 months    Nancy Goodson NP  Endocrinology  [facility]  CC:     Again, thank you for allowing me to participate in the care of your patient.        Sincerely,        ALO Lei CNP    "

## 2020-02-04 NOTE — PROGRESS NOTES
"Name: Jocelyn Weber (Last seen 10/24/2019)  F/u For   Chief Complaint   Patient presents with     Weight Problem     Waist circ. 45.25\"     HPI:  Jocelyn Weber is a 39 year old female who presents for the management of Obesity.  Has noted a steady weight gain over the years and inability to lose weight with effort.      The only time she was able to lose weight was in  while taking phentermine, was able to lose about 40 lbs.  FH is + for mother with hypothyroidism.  Previous labs were unremarkable, thyroid antibodies were not elevated, TSH was < 3 with normal T3 and T4 levels.    She has two young children.    She was interested in starting weight loss medication.  Phentermine was started 3/9/2017.   Topamax 25 mg bid added 2018.  Dose increased to 50 mg bid 2018.  Frequently misses pm dose of Topamax.    Continues to tolerate phentermine well.  Topamax causing Pepsi to taste funny but otherwise tolerating well.    Wasn't watching her diet over the holidays.  Lost her job.  Has a final interview for a new job today.  Between the holiday's and stress of losing her job, she was not really following her meal plan.  Now motivated to resume diet and exercise efforts.    PMH/PSH:  Past Medical History:   Diagnosis Date     Obesity      Past Surgical History:   Procedure Laterality Date     BLOOD PATCH N/A 2015    Procedure: EPIDURAL BLOOD PATCH;  Surgeon: GENERIC ANESTHESIA PROVIDER;  [facility]     BUNIONECTOMY  96      SECTION  3/12      SECTION N/A 2015    Procedure:  SECTION;  Surgeon: Becky Aceves MD;  [facility] L+D     HC TOOTH EXTRACTION W/FORCEP       TONSILLECTOMY       Family Hx:  Family History   Problem Relation Age of Onset     Family History Negative Mother      Other - See Comments Mother         hysterectomy d/t pre-cancer uterine polyps      Family History Negative Father      Other - See Comments Father         aortic disection " "     Heart Disease Maternal Grandmother 65        enlarged heart     Heart Disease Maternal Grandfather      Cerebrovascular Disease Maternal Grandfather      Heart Disease Paternal Grandmother 40     Heart Disease Paternal Grandfather      Cerebrovascular Disease Paternal Grandfather 40     Ovarian Cancer Maternal Aunt      Thyroid disease: Yes: mother.         DM2: Yes: MGF         Autoimmune: DM1, SLE, RA, Vitiligo No    Social Hx:         MEDICATIONS:  has a current medication list which includes the following prescription(s): phentermine and topiramate.    ROS     ROS: 10 point ROS neg other than the symptoms noted above in the HPI.      Physical Exam   VS: /73 (BP Location: Right arm, Patient Position: Chair, Cuff Size: Adult Large)   Pulse 54   Resp 14   Ht 1.695 m (5' 6.75\")   Wt 113.7 kg (250 lb 11.2 oz)   LMP 01/10/2020 (Exact Date)   Breastfeeding No   BMI 39.56 kg/m    GENERAL: NAD, well dressed, answering questions appropriately, appears stated age.  HEENT: no exopthalmous, no proptosis, no lig lag, no retraction, no scleral icterus  RESPIRATORY: Clear. Normal respiratory effort.  CARDIOVASCULAR: RRR  EXTREMITIES: no edema  NEUROLOGY: CN grossly intact, no tremors  MSK: grossly intact. Normal gait and station.  PSYCH: Intact judgment and insight. A&OX3 with a cordial affect.    LABS:  ENDO THYROID LABS-UMP Latest Ref Rng & Units 7/11/2019 1/31/2017   TSH 0.40 - 4.00 mU/L 2.15 2.13   T4 FREE 0.76 - 1.46 ng/dL  1.02   FREE T3 2.3 - 4.2 pg/mL  2.7   THYROGLOBULIN ANTIBODY <40 IU/mL  <20   THYR PEROXIDASE DANIEL <35 IU/mL  <10     !COMPREHENSIVE Latest Ref Rng & Units 7/11/2019   SODIUM 133 - 144 mmol/L 140   POTASSIUM 3.4 - 5.3 mmol/L 4.3   CHLORIDE 94 - 109 mmol/L 106   BUN 7 - 30 mg/dL 12   Creatinine 0.52 - 1.04 mg/dL 0.76   Glucose 70 - 99 mg/dL 92   ANION GAP 3 - 14 mmol/L 7   CALCIUM 8.5 - 10.1 mg/dL 8.8   ALBUMIN 3.4 - 5.0 g/dL 3.7   PROTEIN, TOTAL 6.8 - 8.8 g/dL 7.1     !LIPID/HEPATIC " "Latest Ref Rng & Units 7/11/2019   CHOLESTEROL <200 mg/dL 243 (H)   TRIGLYCERIDES <150 mg/dL 142   HDL CHOLESTEROL >49 mg/dL 45 (L)   LDL CHOLESTEROL, CALCULATED <100 mg/dL 170 (H)   VLDL-CHOLESTEROL 0 - 30 mg/dL    NON HDL CHOLESTEROL <130 mg/dL 198 (H)   CHOLESTEROL/HDL RATIO 0.0 - 5.0    AST 0 - 45 U/L 29   ALT 0 - 50 U/L 50     Component    Latest Ref Rng & Units 2/5/2017   Saliva Collection Time     2300   Cortisol Saliva    ug/dL 0.057     Vital Signs 7/11/2019 10/24/2019 2/4/2020   Weight (LB) 249 lb 243 lb 250 lb 11.2 oz   Height   5' 6.75\"   BMI (Calculated)   39.56     Waist Circumference: 51\" (3/9/17).  46.75\" (4/12/2017).  47.75\" (7/26/2017).  44.25\" (12/6/2017). 42.75\" (5/2018).  47.5\" (12/2018).  47.25\" (3/2019).  49.75\" (7/2019). 45.25\" (today)    All pertinent notes, labs, and images personally reviewed by me.     A/P  Ms.Jonelle Weber is a 39 year old here for the management of:    1. Morbid obesity due to excess calories (H)    Current BMI 39-40.  Obesity related comorbidity:  Hyperlipidemia.    I informed the pt that:  1.Weight loss medications can be taken to assist with weight reduction when combined with appropriate healthy lifestyle changes.  2.I discussed possible s/e, risks and benefits of weight loss medications.  3.All medications are FDA approved, however, some may be used ''off label'' for their weight loss benefits and some ''short term'' medications can be used for longer periods to achieve desired clinical outcomes.  4.All patients taking weight loss medications must be seen in clinic for refill authorization.    Counseling exercise ( V65.41) - try to exercise daily  Dietary counseling( V65.3) - tracking intake using calorie tracking kendrick  Calculated BMI above the upper parameter and f/u plan was documented in the medical record.  Discussed indications, risks and benefits of all medications prescribed, and answered questions to patient's satisfaction.    Had lost 22 lbs since " "starting phentermine + diet and exercise efforts, 265-->249-->240-->243. Lbs.  Has gained 7# since last seen -->250 lbs, likely due to dietary indiscretions.  BMI has decreased 41-->39  Waist circumference has decreased 51\" --> 45\".      Feels motivated to resume diet and exercise efforts.  Continue Phentermine 37.5 mg every day + increase topiramate to 75 mg bid, then 100 once to twice daily as needed.  Weight loss goal 5% of current weight over the next 3 months = 5 lbs per month  Consider IDM program - diet recommendation handout provided.    Hyperlipidemia - Currently untreated.  Total and LDL cholesterol elevated.  I would expect some improvement with diet and exercise efforts.  Continue to monitor.    Labs ordered today:   No orders of the defined types were placed in this encounter.    Radiology/Consults ordered today: None    More than 50% of the time spent with Ms. Weber on counseling / coordinating her care discussing the above plan of care.The patient indicates understanding of the above issues and agrees with the plan set forth.  Total face to face time was 15 minutes..      Follow-up:  3 months    Nancy Goodson NP  Endocrinology  [facility]  CC:   "

## 2020-03-02 ENCOUNTER — HEALTH MAINTENANCE LETTER (OUTPATIENT)
Age: 40
End: 2020-03-02

## 2020-05-04 ENCOUNTER — TELEPHONE (OUTPATIENT)
Dept: ENDOCRINOLOGY | Facility: CLINIC | Age: 40
End: 2020-05-04

## 2020-05-04 NOTE — TELEPHONE ENCOUNTER
Left message for patient of need to change upcoming appointment as scheduled for this Thursday, May 7th at 6:30 p.m. to a Video or Phone visit.  [employer] message sent as follows:    Isma Banks,    I left you a voicemail but wanted to send you a [employer] message as well regarding your upcoming appointment scheduled in Endocrinology.  You are scheduled to see Nancy Goodson NP, this Thursday, May 7th at 6:30 pm.  Unfortunately we are not seeing patients in clinic at this time as we are trying to minimize patient exposure and keep our patients healthy while also practicing social distancing due to COVID19.  We would be happy to do your visit by Video or phone as scheduled for 5/07/2020 at 6:30 p.m.  If this works for you, please notify the clinic so we can make the necessary changes to your appointment.  If not, please call us to reschedule your appointment.  We are currently not scheduling in clinic visits sooner than July 2020.  Please let us know what works best for you.      Thank you,  Cornelia Chowdhury M.A.  Endocrinology  [facility]  [phone] [facility]

## 2020-05-05 NOTE — TELEPHONE ENCOUNTER
Patient returned call.  States at this time, she is going to take a break from weight loss medications.  She cancelled her clinic appointment for now.  Cornelia Chowdhury CMA on 5/5/2020 at 2:12 PM

## 2020-11-11 ENCOUNTER — ALLIED HEALTH/NURSE VISIT (OUTPATIENT)
Dept: NURSING | Facility: CLINIC | Age: 40
End: 2020-11-11
Payer: COMMERCIAL

## 2020-11-11 DIAGNOSIS — Z23 NEED FOR PROPHYLACTIC VACCINATION AND INOCULATION AGAINST INFLUENZA: Primary | ICD-10-CM

## 2020-11-11 PROCEDURE — 90471 IMMUNIZATION ADMIN: CPT

## 2020-11-11 PROCEDURE — 99207 PR NO CHARGE NURSE ONLY: CPT

## 2020-11-11 PROCEDURE — 90686 IIV4 VACC NO PRSV 0.5 ML IM: CPT

## 2021-04-18 ENCOUNTER — HEALTH MAINTENANCE LETTER (OUTPATIENT)
Age: 41
End: 2021-04-18

## 2021-06-22 NOTE — PROGRESS NOTES
Conjuntivae and eyelids appear normal , Sclerae : White without injection, no icterus. Name: Jocelyn Weber (Last seen 2017)  Seen at the request of No ref. provider found For   Chief Complaint   Patient presents with     Follow Up For     weight loss     HPI:  Jocelyn Weber is a 36 year old female who presents for the evaluation/management of steady weight gain over the years and inability to lose weight with effort.  She is concerned it may be due to thyroid disease.      The only time she was able to lose weight was in  while taking phentermine, was able to lose about 40 lbs.  FH is + for mother with hypothyroidism.  Recent labs were unremarkable, thyroid antibodies were not elevated, TSH was < 3 with normal T3 and T4 levels.    She has two children, ages 5 and 18 months.    She is interested in starting weight loss medication.    PMH/PSH:  Past Medical History   Diagnosis Date     Obesity      Past Surgical History   Procedure Laterality Date      section  3/12     Tonsillectomy       Bunionectomy  96     Hc tooth extraction w/forcep        section N/A 2015     Procedure:  SECTION;  Surgeon: Becky Aceves MD;  [facility] L[facility]      N/A 2015     Procedure: OUT OF OR RECOVERY;  Surgeon: GENERIC ANESTHESIA PROVIDER;  [facility] OR     Blood patch N/A 2015     Procedure: EPIDURAL BLOOD PATCH;  Surgeon: GENERIC ANESTHESIA PROVIDER;  [facility] OR     Family Hx:  Family History   Problem Relation Age of Onset     HEART DISEASE Maternal Grandmother 65     enlarged heart     HEART DISEASE Maternal Grandfather      HEART DISEASE Paternal Grandmother 40     HEART DISEASE Paternal Grandfather      CEREBROVASCULAR DISEASE Maternal Grandfather      CEREBROVASCULAR DISEASE Paternal Grandfather 40     Family History Negative Mother      Family History Negative Father      Thyroid disease: Yes: mother.         DM2: Yes: MGF         Autoimmune: DM1, SLE, RA, Vitiligo No    Social Hx:         MEDICATIONS:  has a current medication list  "which includes the following prescription(s): phentermine, and the following Facility-Administered Medications: bupivacaine 0.25 % - epinephrine 1:200,000 and ketorolac.    ROS     ROS: 10 point ROS neg other than the symptoms noted above in the HPI.    GENERAL: no weight loss, fevers, chills, malaise, night sweats.   HEENT: no dysphagia, odynophagia, diplopia, neck pain or tenderness  CV: no chest pain, pressure, palpitations, skipped beats, LOC  LUNGS: no SOB, PERDUE, cough, sputum production, wheezing   ABDOMEN: no diarrhea or abdominal pain, + occasional constipation  EXTREMITIES: no rashes, ulcers, edema  NEUROLOGY: no changes in vision, tingling or numbness in hands or feet.   MSK: no muscle aches or pains, weakness  SKIN: no rashes or lesions  ENDOCRINE: no heat or cold intolerance    Physical Exam   VS: /72 (BP Location: Left arm, Cuff Size: Adult Regular)  Pulse 50  Resp 12  Ht 1.695 m (5' 6.75\")  Wt 117.8 kg (259 lb 9.6 oz)  BMI 40.96 kg/m2  GENERAL: AXOX3, NAD, well dressed, answering questions appropriately, appears stated age.  HEENT: no exophthalmos, no proptosis, EOMI, no lig lag, no retraction  NECK:  Supple, thyroid gland palpably full, no tenderness with palpation, no adenopathy.  CV: RRR, no rubs, gallops, no murmurs  LUNGS: CTAB, no wheezes, rales, or rhonchi  ABDOMEN: soft, nontender, nondistended  EXTREMITIES: no edema, +pulses, no rashes, no lesions  NEUROLOGY: CN grossly intact, no tremors  MSK: grossly intact  SKIN: no rashes, no lesions    LABS:  Component    Latest Ref Rng & Units 1/31/2017   TSH    0.40 - 4.00 mU/L 2.13   T4 Free    0.76 - 1.46 ng/dL 1.02   Free T3    2.3 - 4.2 pg/mL 2.7   Thyroglobulin Antibody    <40 IU/mL <20   Thyroid Peroxidase Antibody    <35 IU/mL <10     !COMPREHENSIVE Latest Ref Rng & Units 1/31/2017   SODIUM 133 - 144 mmol/L 140   POTASSIUM 3.4 - 5.3 mmol/L 4.2   CHLORIDE 94 - 109 mmol/L 109   BUN 7 - 30 mg/dL 11   Creatinine 0.52 - 1.04 mg/dL 0.73   Glucose " "70 - 99 mg/dL 93   ANION GAP 3 - 14 mmol/L 5   CALCIUM 8.5 - 10.1 mg/dL 8.8   ALBUMIN 3.4 - 5.0 g/dL 3.6     !LIPID/HEPATIC Latest Ref Rng & Units 4/9/2014 1/31/2017   CHOLESTEROL <200 mg/dL 231 (H) 161   TRIGLYCERIDES <150 mg/dL 167 (H) 100   HDL CHOLESTEROL >49 mg/dL 41 (L) 37 (L)   LDL CHOLESTEROL, CALCULATED <100 mg/dL 157 (H) 104 (H)   VLDL-CHOLESTEROL 0 - 30 mg/dL 33 (H)    NON HDL CHOLESTEROL <130 mg/dL  124   CHOLESTEROL/HDL RATIO 0.0 - 5.0 5.7 (H)    AST 0 - 45 U/L  31   ALT 0 - 50 U/L  59 (H)     Component    Latest Ref Rng & Units 2/5/2017   Saliva Collection Time     2300   Cortisol Saliva    ug/dL 0.057     Vital Signs 9/30/2015 11/18/2016 1/31/2017   Weight (LB) 224 lb 265 lb 1 oz 258 lb 14.4 oz   Height  5' 7\" 5' 6.75\"   BMI  41.6 40.94   Waist Circumference: 51\" (3/9/17).    All pertinent notes, labs, and images personally reviewed by me.     A/P  Ms.Jonelle Weebr is a 36 year old here for the evaluation of:    1. Morbid obesity due to excess calories (H)    2. BMI 40.0-44.9, adult (H)    3. Hyperlipidemia LDL goal <130      I informed the pt that:  1.Weight loss medications can be taken to assist with weight reduction when combined with appropriate healthy lifestyle changes.  2.I discussed possible s/e, risks and benefits of weight loss medications.  3.All medications are FDA approved, however, some may be used ''off label'' for their weight loss benefits and some ''short term'' medications can be used for longer periods to achieve desired clinical outcomes.  4.All patients taking weight loss medications must be seen in clinic for refill authorization.    Counseling exercise ( V65.41)  Dietary counseling( V65.3)  Calculated BMI above the upper parameter and f/u plan was documented in the medical record.  Discussed indications, risks and benefits of all medications prescribed, and answered questions to patient's satisfaction.  Start Phentermine 37.5 mg, 1/2 tab q am x 1 week then 1 tab q am as " tolerated.    Hyperlipidemia - Currently untreated.  Total and LDL cholesterol has improved compared with 2014.  I would expect some further improvement with diet and exercise efforts.  Will continue to monitor.    Labs ordered today:   No orders of the defined types were placed in this encounter.    Radiology/Consults ordered today: None    More than 50% of the time spent with Ms. Weber on counseling / coordinating her care.  Total face to face time was greater than or equal to 25 minutes.      Follow-up:  1 month    Nancy oGodson NP  Endocrinology  [facility]  CC: Carri Collins Ra

## 2021-10-03 ENCOUNTER — MYC MEDICAL ADVICE (OUTPATIENT)
Dept: FAMILY MEDICINE | Facility: CLINIC | Age: 41
End: 2021-10-03

## 2021-10-03 ENCOUNTER — HEALTH MAINTENANCE LETTER (OUTPATIENT)
Age: 41
End: 2021-10-03

## 2021-10-04 ENCOUNTER — IMMUNIZATION (OUTPATIENT)
Dept: FAMILY MEDICINE | Facility: CLINIC | Age: 41
End: 2021-10-04
Payer: COMMERCIAL

## 2021-10-04 PROCEDURE — 90686 IIV4 VACC NO PRSV 0.5 ML IM: CPT

## 2021-10-04 PROCEDURE — 90471 IMMUNIZATION ADMIN: CPT

## 2022-05-15 ENCOUNTER — HEALTH MAINTENANCE LETTER (OUTPATIENT)
Age: 42
End: 2022-05-15

## 2022-05-18 ENCOUNTER — OFFICE VISIT (OUTPATIENT)
Dept: FAMILY MEDICINE | Facility: CLINIC | Age: 42
End: 2022-05-18
Payer: COMMERCIAL

## 2022-05-18 VITALS
RESPIRATION RATE: 16 BRPM | DIASTOLIC BLOOD PRESSURE: 88 MMHG | HEIGHT: 67 IN | OXYGEN SATURATION: 98 % | HEART RATE: 64 BPM | WEIGHT: 285.5 LBS | SYSTOLIC BLOOD PRESSURE: 136 MMHG | BODY MASS INDEX: 44.81 KG/M2 | TEMPERATURE: 98.1 F

## 2022-05-18 DIAGNOSIS — Z00.00 ROUTINE GENERAL MEDICAL EXAMINATION AT A HEALTH CARE FACILITY: Primary | ICD-10-CM

## 2022-05-18 DIAGNOSIS — F32.2 CURRENT SEVERE EPISODE OF MAJOR DEPRESSIVE DISORDER WITHOUT PSYCHOTIC FEATURES WITHOUT PRIOR EPISODE (H): ICD-10-CM

## 2022-05-18 DIAGNOSIS — Z11.59 NEED FOR HEPATITIS C SCREENING TEST: ICD-10-CM

## 2022-05-18 DIAGNOSIS — Z23 HIGH PRIORITY FOR 2019-NCOV VACCINE: ICD-10-CM

## 2022-05-18 DIAGNOSIS — E66.01 MORBID OBESITY (H): ICD-10-CM

## 2022-05-18 LAB
ALBUMIN SERPL-MCNC: 3.6 G/DL (ref 3.4–5)
ALP SERPL-CCNC: 73 U/L (ref 40–150)
ALT SERPL W P-5'-P-CCNC: 30 U/L (ref 0–50)
ANION GAP SERPL CALCULATED.3IONS-SCNC: 6 MMOL/L (ref 3–14)
AST SERPL W P-5'-P-CCNC: 21 U/L (ref 0–45)
BILIRUB SERPL-MCNC: 0.5 MG/DL (ref 0.2–1.3)
BUN SERPL-MCNC: 11 MG/DL (ref 7–30)
CALCIUM SERPL-MCNC: 8.6 MG/DL (ref 8.5–10.1)
CHLORIDE BLD-SCNC: 106 MMOL/L (ref 94–109)
CHOLEST SERPL-MCNC: 216 MG/DL
CO2 SERPL-SCNC: 27 MMOL/L (ref 20–32)
CREAT SERPL-MCNC: 0.74 MG/DL (ref 0.52–1.04)
ERYTHROCYTE [DISTWIDTH] IN BLOOD BY AUTOMATED COUNT: 14 % (ref 10–15)
FASTING STATUS PATIENT QL REPORTED: YES
GFR SERPL CREATININE-BSD FRML MDRD: >90 ML/MIN/1.73M2
GLUCOSE BLD-MCNC: 86 MG/DL (ref 70–99)
HCT VFR BLD AUTO: 42.3 % (ref 35–47)
HCV AB SERPL QL IA: NONREACTIVE
HDLC SERPL-MCNC: 39 MG/DL
HGB BLD-MCNC: 14.1 G/DL (ref 11.7–15.7)
LDLC SERPL CALC-MCNC: 134 MG/DL
MCH RBC QN AUTO: 28 PG (ref 26.5–33)
MCHC RBC AUTO-ENTMCNC: 33.3 G/DL (ref 31.5–36.5)
MCV RBC AUTO: 84 FL (ref 78–100)
NONHDLC SERPL-MCNC: 177 MG/DL
PLATELET # BLD AUTO: 172 10E3/UL (ref 150–450)
POTASSIUM BLD-SCNC: 3.9 MMOL/L (ref 3.4–5.3)
PROT SERPL-MCNC: 7 G/DL (ref 6.8–8.8)
RBC # BLD AUTO: 5.04 10E6/UL (ref 3.8–5.2)
SODIUM SERPL-SCNC: 139 MMOL/L (ref 133–144)
TRIGL SERPL-MCNC: 216 MG/DL
TSH SERPL DL<=0.005 MIU/L-ACNC: 1.95 MU/L (ref 0.4–4)
WBC # BLD AUTO: 5.6 10E3/UL (ref 4–11)

## 2022-05-18 PROCEDURE — 91305 COVID-19,PF,PFIZER (12+ YRS): CPT | Performed by: FAMILY MEDICINE

## 2022-05-18 PROCEDURE — 99214 OFFICE O/P EST MOD 30 MIN: CPT | Mod: 25 | Performed by: FAMILY MEDICINE

## 2022-05-18 PROCEDURE — 85027 COMPLETE CBC AUTOMATED: CPT | Performed by: FAMILY MEDICINE

## 2022-05-18 PROCEDURE — 36415 COLL VENOUS BLD VENIPUNCTURE: CPT | Performed by: FAMILY MEDICINE

## 2022-05-18 PROCEDURE — 80053 COMPREHEN METABOLIC PANEL: CPT | Performed by: FAMILY MEDICINE

## 2022-05-18 PROCEDURE — 80061 LIPID PANEL: CPT | Performed by: FAMILY MEDICINE

## 2022-05-18 PROCEDURE — 99385 PREV VISIT NEW AGE 18-39: CPT | Mod: 25 | Performed by: FAMILY MEDICINE

## 2022-05-18 PROCEDURE — 84443 ASSAY THYROID STIM HORMONE: CPT | Performed by: FAMILY MEDICINE

## 2022-05-18 PROCEDURE — 96127 BRIEF EMOTIONAL/BEHAV ASSMT: CPT | Performed by: FAMILY MEDICINE

## 2022-05-18 PROCEDURE — 86803 HEPATITIS C AB TEST: CPT | Performed by: FAMILY MEDICINE

## 2022-05-18 PROCEDURE — 0054A COVID-19,PF,PFIZER (12+ YRS): CPT | Performed by: FAMILY MEDICINE

## 2022-05-18 RX ORDER — HYDROXYZINE HYDROCHLORIDE 25 MG/1
25 TABLET, FILM COATED ORAL EVERY 6 HOURS PRN
Qty: 90 TABLET | Refills: 1 | Status: SHIPPED | OUTPATIENT
Start: 2022-05-18 | End: 2022-12-09

## 2022-05-18 ASSESSMENT — ANXIETY QUESTIONNAIRES
7. FEELING AFRAID AS IF SOMETHING AWFUL MIGHT HAPPEN: NEARLY EVERY DAY
2. NOT BEING ABLE TO STOP OR CONTROL WORRYING: NEARLY EVERY DAY
3. WORRYING TOO MUCH ABOUT DIFFERENT THINGS: MORE THAN HALF THE DAYS
3. WORRYING TOO MUCH ABOUT DIFFERENT THINGS: MORE THAN HALF THE DAYS
IF YOU CHECKED OFF ANY PROBLEMS ON THIS QUESTIONNAIRE, HOW DIFFICULT HAVE THESE PROBLEMS MADE IT FOR YOU TO DO YOUR WORK, TAKE CARE OF THINGS AT HOME, OR GET ALONG WITH OTHER PEOPLE: VERY DIFFICULT
6. BECOMING EASILY ANNOYED OR IRRITABLE: MORE THAN HALF THE DAYS
7. FEELING AFRAID AS IF SOMETHING AWFUL MIGHT HAPPEN: NEARLY EVERY DAY
5. BEING SO RESTLESS THAT IT IS HARD TO SIT STILL: MORE THAN HALF THE DAYS
1. FEELING NERVOUS, ANXIOUS, OR ON EDGE: NEARLY EVERY DAY
1. FEELING NERVOUS, ANXIOUS, OR ON EDGE: NEARLY EVERY DAY
6. BECOMING EASILY ANNOYED OR IRRITABLE: MORE THAN HALF THE DAYS
2. NOT BEING ABLE TO STOP OR CONTROL WORRYING: NEARLY EVERY DAY
GAD7 TOTAL SCORE: 17
5. BEING SO RESTLESS THAT IT IS HARD TO SIT STILL: MORE THAN HALF THE DAYS
GAD7 TOTAL SCORE: 17
IF YOU CHECKED OFF ANY PROBLEMS ON THIS QUESTIONNAIRE, HOW DIFFICULT HAVE THESE PROBLEMS MADE IT FOR YOU TO DO YOUR WORK, TAKE CARE OF THINGS AT HOME, OR GET ALONG WITH OTHER PEOPLE: VERY DIFFICULT
GAD7 TOTAL SCORE: 17

## 2022-05-18 ASSESSMENT — ENCOUNTER SYMPTOMS
PARESTHESIAS: 0
EYE PAIN: 0
NAUSEA: 0
DYSURIA: 0
HEADACHES: 0
MYALGIAS: 0
ABDOMINAL PAIN: 0
CHILLS: 0
HEMATURIA: 0
CONSTIPATION: 0
DIZZINESS: 0
COUGH: 0
FREQUENCY: 0
JOINT SWELLING: 1
ARTHRALGIAS: 1
HEARTBURN: 0
SORE THROAT: 0
DIARRHEA: 0
WEAKNESS: 0
SHORTNESS OF BREATH: 0
PALPITATIONS: 0
FEVER: 0
NERVOUS/ANXIOUS: 1
BREAST MASS: 0
HEMATOCHEZIA: 0

## 2022-05-18 ASSESSMENT — PATIENT HEALTH QUESTIONNAIRE - PHQ9
SUM OF ALL RESPONSES TO PHQ QUESTIONS 1-9: 13
10. IF YOU CHECKED OFF ANY PROBLEMS, HOW DIFFICULT HAVE THESE PROBLEMS MADE IT FOR YOU TO DO YOUR WORK, TAKE CARE OF THINGS AT HOME, OR GET ALONG WITH OTHER PEOPLE: VERY DIFFICULT
5. POOR APPETITE OR OVEREATING: MORE THAN HALF THE DAYS
5. POOR APPETITE OR OVEREATING: MORE THAN HALF THE DAYS
SUM OF ALL RESPONSES TO PHQ QUESTIONS 1-9: 13

## 2022-05-18 ASSESSMENT — PAIN SCALES - GENERAL: PAINLEVEL: MILD PAIN (2)

## 2022-05-18 NOTE — PROGRESS NOTES
SUBJECTIVE:   CC: Jocelyn Weber is an 41 year old woman who presents for preventive health visit. Answers for HPI/ROS submitted by the patient on 2022  If you checked off any problems, how difficult have these problems made it for you to do your work, take care of things at home, or get along with other people?: Very difficult  PHQ9 TOTAL SCORE: 13          Patient has been advised of split billing requirements and indicates understanding: Yes  Healthy Habits:     Getting at least 3 servings of Calcium per day:  Yes    Bi-annual eye exam:  NO    Dental care twice a year:  Yes    Sleep apnea or symptoms of sleep apnea:  None    Diet:  Regular (no restrictions)    Frequency of exercise:  2-3 days/week    Duration of exercise:  15-30 minutes    Taking medications regularly:  Yes    Medication side effects:  Not applicable and None    PHQ-2 Total Score: 4    Additional concerns today:  Yes    PHQ 2022   PHQ-9 Total Score 13   Q9: Thoughts of better off dead/self-harm past 2 weeks Not at all     RAISSA-7 SCORE 2022   Total Score 17     Mom with new onset heart disease and this has really upset her.  mom has thyroid disease  Often in tears.  Dad  of heart disease.     cholesterol tests have been high, she is concerned about her weight.  Is not taking care of herself, wants to make a change, but has been depressed, lack of motivation. Wants to exercise more. has gained since last visit.    LDL Cholesterol Calculated   Date Value Ref Range Status   2022 134 (H) <=100 mg/dL Final   2019 170 (H) <100 mg/dL Final     Comment:     Above desirable:  100-129 mg/dl  Borderline High:  130-159 mg/dL  High:             160-189 mg/dL  Very high:       >189 mg/dl                   Today's PHQ-2 Score:   PHQ-2 (  [employer]) 2022   Q1: Little interest or pleasure in doing things 2   Q2: Feeling down, depressed or hopeless 2   PHQ-2 Score 4   Q1: Little interest or pleasure in doing things More  than half the days   Q2: Feeling down, depressed or hopeless More than half the days   PHQ-2 Score 4       Abuse: Current or Past (Physical, Sexual or Emotional) - NO  Do you feel safe in your environment? YES    Have you ever done Advance Care Planning? (For example, a Health Directive, POLST, or a discussion with a medical provider or your loved ones about your wishes): No, advance care planning information given to patient to review.  Advanced care planning was discussed at today's visit.    Social History     Tobacco Use     Smoking status: Former Smoker     Smokeless tobacco: Never Used     Tobacco comment: passive smoker in her 20's   Substance Use Topics     Alcohol use: Yes     Comment: occasionally     If you drink alcohol do you typically have >3 drinks per day or >7 drinks per week? No    Alcohol Use 5/18/2022   Prescreen: >3 drinks/day or >7 drinks/week? No   Prescreen: >3 drinks/day or >7 drinks/week? -   No flowsheet data found.    Reviewed orders with patient.  Reviewed health maintenance and updated orders accordingly - Yes  BP Readings from Last 3 Encounters:   05/18/22 136/88   02/04/20 122/73   10/24/19 106/69    Wt Readings from Last 3 Encounters:   05/18/22 129.5 kg (285 lb 8 oz)   02/04/20 113.7 kg (250 lb 11.2 oz)   10/24/19 110.2 kg (243 lb)                  Recent Labs   Lab Test 05/18/22  0811 07/11/19  0729 01/31/17  0827   * 170* 104*   HDL 39* 45* 37*   TRIG 216* 142 100   ALT 30 50 59*   CR 0.74 0.76 0.73   GFRESTIMATED >90 >90 >90  Non African American GFR Calc     GFRESTBLACK  --  >90 >90  African American GFR Calc     POTASSIUM 3.9 4.3 4.2   TSH 1.95 2.15 2.13        Breast Cancer Screening:  Any new diagnosis of family breast, ovarian, or bowel cancer? No    FHS-7: No flowsheet data found.    Patient under 40 years of age: Routine Mammogram Screening not recommended.   Pertinent mammograms are reviewed under the imaging tab.    History of abnormal Pap smear: NO - age 30-65  "PAP every 5 years with negative HPV co-testing recommended  PAP / HPV Latest Ref Rng & Units 9/11/2018 4/9/2014   PAP (Historical) - NIL NIL   HPV16 NEG:Negative Negative -   HPV18 NEG:Negative Negative -   HRHPV NEG:Negative Negative -     Reviewed and updated as needed this visit by clinical staff   Tobacco  Allergies  Meds  Problems  Med Hx  Surg Hx  Fam Hx  Soc   Hx          Reviewed and updated as needed this visit by Provider                       Review of Systems   Constitutional: Negative for chills and fever.   HENT: Negative for congestion, ear pain, hearing loss and sore throat.    Eyes: Negative for pain and visual disturbance.   Respiratory: Negative for cough and shortness of breath.    Cardiovascular: Negative for chest pain, palpitations and peripheral edema.   Gastrointestinal: Negative for abdominal pain, constipation, diarrhea, heartburn, hematochezia and nausea.   Breasts:  Negative for tenderness, breast mass and discharge.   Genitourinary: Positive for vaginal bleeding. Negative for dysuria, frequency, genital sores, hematuria, pelvic pain, urgency and vaginal discharge.   Musculoskeletal: Positive for arthralgias and joint swelling. Negative for myalgias.   Skin: Negative for rash.   Neurological: Negative for dizziness, weakness, headaches and paresthesias.   Psychiatric/Behavioral: Negative for mood changes. The patient is nervous/anxious.           OBJECTIVE:   /88 (BP Location: Right arm, Patient Position: Sitting, Cuff Size: Adult Large)   Pulse 64   Temp 98.1  F (36.7  C) (Oral)   Resp 16   Ht 1.689 m (5' 6.5\")   Wt 129.5 kg (285 lb 8 oz)   LMP 05/15/2022   SpO2 98%   BMI 45.39 kg/m    Physical Exam  GENERAL: healthy, alert and no distress  EYES: Eyes grossly normal to inspection, PERRL and conjunctivae and sclerae normal  HENT: ear canals and TM's normal, nose and mouth without ulcers or lesions  NECK: no adenopathy, no asymmetry, masses, or scars and thyroid " normal to palpation  RESP: lungs clear to auscultation - no rales, rhonchi or wheezes  BREAST: normal without masses, tenderness or nipple discharge and no palpable axillary masses or adenopathy  CV: regular rate and rhythm, normal S1 S2, no S3 or S4, no murmur, click or rub, no peripheral edema and peripheral pulses strong  ABDOMEN: soft, nontender, no hepatosplenomegaly, no masses and bowel sounds normal  MS: no gross musculoskeletal defects noted, no edema  SKIN: no suspicious lesions or rashes  NEURO: Normal strength and tone, mentation intact and speech normal  PSYCH: mentation appears normal, affect normal/bright    Diagnostic Test Results:  Labs reviewed in Epic    ASSESSMENT/PLAN:   (Z00.00) Routine general medical examination at a health care facility  (primary encounter diagnosis)  Comment: normal exam, labs pendedAnswers for HPI/ROS submitted by the patient on 5/18/2022  If you checked off any problems, how difficult have these problems made it for you to do your work, take care of things at home, or get along with other people?: Very difficult  PHQ9 TOTAL SCORE: 13      Plan: Lipid panel reflex to direct LDL Fasting, TSH         with free T4 reflex, Comprehensive metabolic         panel (BMP + Alb, Alk Phos, ALT, AST, Total.         Bili, TP), CBC with platelets            (Z11.59) Need for hepatitis C screening test  Comment: agrees  Plan: Hepatitis C Screen Reflex to HCV RNA Quant and         Genotype              (F32.2) Current severe episode of major depressive disorder without psychotic features without prior episode (H)  Comment: will start med, ok to use atarax for sleep or 1/2 tab for anxiety, referral placed  Plan: sertraline (ZOLOFT) 50 MG tablet, hydrOXYzine         (ATARAX) 25 MG tablet, Adult Mental Health          Referral            (Z23) High priority for 2019-nCoV vaccine  Comment: agrees  Plan: COVID-19,PF,PFIZER (12+ Yrs GRAY LABEL)          Morbid obesity-discussed diet and  "exercise and treating depression with help with motivation. This will also help her cholesterol and risk of heart disease to loose weight      Patient has been advised of split billing requirements and indicates understanding: Yes    COUNSELING:  Reviewed preventive health counseling, as reflected in patient instructions       Regular exercise       Healthy diet/nutrition    Estimated body mass index is 45.39 kg/m  as calculated from the following:    Height as of this encounter: 1.689 m (5' 6.5\").    Weight as of this encounter: 129.5 kg (285 lb 8 oz).    Weight management plan: Discussed healthy diet and exercise guidelines    She reports that she has quit smoking. She has never used smokeless tobacco.      Counseling Resources:  ATP IV Guidelines  Pooled Cohorts Equation Calculator  Breast Cancer Risk Calculator  BRCA-Related Cancer Risk Assessment: FHS-7 Tool  FRAX Risk Assessment  ICSI Preventive Guidelines  Dietary Guidelines for Americans, 2010  USDA's MyPlate  ASA Prophylaxis  Lung CA Screening    Jennifer Nicholson MD  [facility]  "

## 2022-05-18 NOTE — NURSING NOTE
"Chief Complaint   Patient presents with     Physical     Blood Draw     Fasting labs.      Initial /88 (BP Location: Right arm, Patient Position: Sitting, Cuff Size: Adult Large)   Pulse 64   Temp 98.1  F (36.7  C) (Oral)   Resp 16   Ht 1.689 m (5' 6.5\")   Wt 129.5 kg (285 lb 8 oz)   LMP 05/15/2022   SpO2 98%   BMI 45.39 kg/m   Estimated body mass index is 45.39 kg/m  as calculated from the following:    Height as of this encounter: 1.689 m (5' 6.5\").    Weight as of this encounter: 129.5 kg (285 lb 8 oz).  BP completed using cuff size large right arm    Lisa Magill, CMA    "

## 2022-06-21 ENCOUNTER — VIRTUAL VISIT (OUTPATIENT)
Dept: FAMILY MEDICINE | Facility: CLINIC | Age: 42
End: 2022-06-21
Payer: COMMERCIAL

## 2022-06-21 DIAGNOSIS — J01.00 ACUTE NON-RECURRENT MAXILLARY SINUSITIS: ICD-10-CM

## 2022-06-21 DIAGNOSIS — F32.2 CURRENT SEVERE EPISODE OF MAJOR DEPRESSIVE DISORDER WITHOUT PSYCHOTIC FEATURES WITHOUT PRIOR EPISODE (H): Primary | ICD-10-CM

## 2022-06-21 PROCEDURE — 99214 OFFICE O/P EST MOD 30 MIN: CPT | Mod: GT | Performed by: FAMILY MEDICINE

## 2022-06-21 RX ORDER — DOXYCYCLINE 100 MG/1
100 CAPSULE ORAL 2 TIMES DAILY
Qty: 10 CAPSULE | Refills: 0 | Status: SHIPPED | OUTPATIENT
Start: 2022-06-21 | End: 2022-12-09

## 2022-06-21 ASSESSMENT — PATIENT HEALTH QUESTIONNAIRE - PHQ9
5. POOR APPETITE OR OVEREATING: NEARLY EVERY DAY
SUM OF ALL RESPONSES TO PHQ QUESTIONS 1-9: 14

## 2022-06-21 ASSESSMENT — ANXIETY QUESTIONNAIRES
IF YOU CHECKED OFF ANY PROBLEMS ON THIS QUESTIONNAIRE, HOW DIFFICULT HAVE THESE PROBLEMS MADE IT FOR YOU TO DO YOUR WORK, TAKE CARE OF THINGS AT HOME, OR GET ALONG WITH OTHER PEOPLE: VERY DIFFICULT
3. WORRYING TOO MUCH ABOUT DIFFERENT THINGS: NEARLY EVERY DAY
2. NOT BEING ABLE TO STOP OR CONTROL WORRYING: NEARLY EVERY DAY
GAD7 TOTAL SCORE: 21
GAD7 TOTAL SCORE: 21
7. FEELING AFRAID AS IF SOMETHING AWFUL MIGHT HAPPEN: NEARLY EVERY DAY
6. BECOMING EASILY ANNOYED OR IRRITABLE: NEARLY EVERY DAY
5. BEING SO RESTLESS THAT IT IS HARD TO SIT STILL: NEARLY EVERY DAY
1. FEELING NERVOUS, ANXIOUS, OR ON EDGE: NEARLY EVERY DAY

## 2022-06-21 NOTE — PROGRESS NOTES
Jocelyn is a 42 year old who is being evaluated via a billable video visit.      How would you like to obtain your AVS? MyChart  If the video visit is dropped, the invitation should be resent by: Text to cell phone: [phone]  Will anyone else be joining your video visit? No        Assessment & Plan     Current severe episode of major depressive disorder without psychotic features without prior episode (H)  Will take full dose for a few more weeks and then do Evisit    Acute non-recurrent maxillary sinusitis  Will treat if longer than 10-14 days of worsening sx, pt understands  - doxycycline hyclate (VIBRAMYCIN) 100 MG capsule; Take 1 capsule (100 mg) by mouth 2 times daily    Ordering of each unique test  Prescription drug management         Depression Screening Follow Up    PHQ 6/21/2022   PHQ-9 Total Score 14   Q9: Thoughts of better off dead/self-harm past 2 weeks Not at all         Follow Up Actions Taken           Return in about 1 month (around 7/21/2022) for E-Visit, Depression/anxiety.    Jennifer Nicholson MD  [facility]   Jocelyn is a 42 year old, presenting for the following health issues:  MOOD CHANGES, Sinus Problem, and Video Visit      HPI     Depression and Anxiety Follow-Up    How are you doing with your depression since your last visit? Improved slightly     How are you doing with your anxiety since your last visit?  No change    Are you having other symptoms that might be associated with depression or anxiety? YES     Have you had a significant life event? OTHER: HAD COVID  last week tested positive     Do you have any concerns with your use of alcohol or other drugs? No     Feels more hopeful, has a plan, talking to therapist soon, started walking again, 3 times per week    zoloft 25mg x 3 weeks and 50mg daily for 1 week now, she thought she was     Atarax at bedtime , makes her real tired, and is going to take a 1/2 dose    Social History     Tobacco  Use     Smoking status: Former Smoker     Smokeless tobacco: Never Used     Tobacco comment: passive smoker in her 20's   Vaping Use     Vaping Use: Never used   Substance Use Topics     Alcohol use: Yes     Comment: occasionally     Drug use: No     PHQ 5/18/2022 6/21/2022   PHQ-9 Total Score 13 14   Q9: Thoughts of better off dead/self-harm past 2 weeks Not at all Not at all     RAISSA-7 SCORE 5/18/2022 5/18/2022 6/21/2022   Total Score 17 17 21     Last PHQ-9 6/21/2022   1.  Little interest or pleasure in doing things 0   2.  Feeling down, depressed, or hopeless 1   3.  Trouble falling or staying asleep, or sleeping too much 3   4.  Feeling tired or having little energy 3   5.  Poor appetite or overeating 0   6.  Feeling bad about yourself 1   7.  Trouble concentrating 3   8.  Moving slowly or restless 3   Q9: Thoughts of better off dead/self-harm past 2 weeks 0   PHQ-9 Total Score 14   Difficulty at work, home, or with people Very difficult     RAISSA-7  6/21/2022   1. Feeling nervous, anxious, or on edge 3   2. Not being able to stop or control worrying 3   3. Worrying too much about different things 3   4. Trouble relaxing 3   5. Being so restless that it is hard to sit still 3   6. Becoming easily annoyed or irritable 3   7. Feeling afraid, as if something awful might happen 3   RAISSA-7 Total Score 21   If you checked any problems, how difficult have they made it for you to do your work, take care of things at home, or get along with other people? Very difficult       Suicide Assessment Five-step Evaluation and Treatment (SAFE-T)      How many servings of fruits and vegetables do you eat daily?  2-3    On average, how many sweetened beverages do you drink each day (Examples: soda, juice, sweet tea, etc.  Do NOT count diet or artificially sweetened beverages)?   1    How many days per week do you exercise enough to make your heart beat faster? 3 or less    How many minutes a day do you exercise enough to make your  heart beat faster? 30 - 60    How many days per week do you miss taking your medication? 0    Acute Illness  Acute illness concerns: SINUS ISSUES  Onset/Duration: 1 WEEK   Symptoms:  Fever: LOW GRADE FEVER YESTERDAY.   Chills/Sweats: no  Headache (location?): YES  Sinus Pressure: YES  Conjunctivitis:  no  Ear Pain: YES: both  Rhinorrhea: YES- POST NASAL  Congestion: no  Sore Throat: YES- from the post nasal drainage   Cough: no  Wheeze: YES  Decreased Appetite: YES  Nausea: no  Vomiting: no  Diarrhea: no  Dysuria/Freq.: no  Dysuria or Hematuria: no  Fatigue/Achiness: YES  Sick/Strep Exposure: no  Therapies tried and outcome: mucinex,ibuprofen, tylenol and Afrin.  Rest.      Hx of sinus infections in the past and feels the same, ears and jaw hurt, and getting worse.  Has been years, was using afrin for 3 days, then stopped it, using mucinex and that is helping.       Review of Systems   CONSTITUTIONAL: NEGATIVE for fever, chills, change in weight  ENT/MOUTH: NEGATIVE for ear, mouth and throat problems  RESP: NEGATIVE for significant cough or SOB  CV: NEGATIVE for chest pain, palpitations or peripheral edema      Objective    Vitals - Patient Reported  Weight (Patient Reported): 127 kg (280 lb)  SpO2 (Patient Reported): 96  Pulse (Patient Reported): 82  Pain Score: Moderate Pain (5)  Pain Loc: Other - see comment (sinus)      Vitals:  No vitals were obtained today due to virtual visit.    Physical Exam   GENERAL: Healthy, alert and no distress  EYES: Eyes grossly normal to inspection.  No discharge or erythema, or obvious scleral/conjunctival abnormalities.  RESP: No audible wheeze, cough, or visible cyanosis.  No visible retractions or increased work of breathing.    SKIN: Visible skin clear. No significant rash, abnormal pigmentation or lesions.  NEURO: Cranial nerves grossly intact.  Mentation and speech appropriate for age.  PSYCH: Mentation appears normal, affect normal/bright, judgement and insight intact,  normal speech and appearance well-groomed.                Video-Visit Details    Video Start Time: 11:33 AM    Type of service:  Video Visit    Video End Time:12:00 PM    Originating Location (pt. Location): Home    Distant Location (provider location):  [facility]     Platform used for Video Visit: [employer]    Salo Leong.

## 2022-06-21 NOTE — COMMUNITY RESOURCES LIST (ENGLISH)
06/21/2022   [facility] - Outpatient Clinics  Lisa Magill  For questions about this resource list or additional care needs, please contact your primary care clinic or care manager.  Phone: [phone]   Email: N/A   Address: [address] [place] [id number]   Hours: N/A        Hotlines and Helplines       Hotline - Crisis help  1  [facility] - Child Protection - [facility] Crisis Response Unit Distance: 2.99 miles      COVID-19 Status: Phone/Virtual   [address] [place] [address]  Language: English  Hours: Mon - Sun Open 24 Hours   Phone: (281) 267-5243 Email: [email or web address]. Website: [email or web address]     2  [facility] Crisis Response Unit - Suicide and Crisis Response Hotline Distance: 11.15 miles      COVID-19 Status: Phone/Virtual   [address] [facility] [address] [place] [address]  Language: English  Hours: Mon - Sun Open 24 Hours   Phone: (893) 650-5279 Email: leah@[email or web address]. Website: [email or web address].[facility]/HealthFamily/HandlingEmergencies/Help          Mental Health       Individual counseling  3  [facility] Distance: 0.71 miles      COVID-19 Status: Regular Operations, COVID-19 Status: Phone/Virtual   [address] [place] [address]  Language: English, Senegalese  Hours: Mon - Thu 7:00 AM - 8:00 PM , Fri 7:00 AM - 6:00 PM  Fees: Insurance, Self Pay   Phone: (162) 252-5841 Website: [email or web address]     4  [facility] Distance: 2.59 miles      COVID-19 Status: Phone/Virtual   [address] [facility] [address] [place] [address]  Language: English  Hours: Mon - Thu 8:30 AM - 5:30 PM , Fri 8:30 AM - 4:00 PM  Fees: Insurance, Self Pay   Phone: (662) 271-5755 Email: [email or web address] Website: [email or web address] [email or web address] [email or web address]/locations/[facility]/     Mental health crisis care  5  [facility]  [facility] Distance: 7.09 miles      COVID-19 Status: Regular Operations, COVID-19 Status: Phone/Virtual   [address] [place] [address]  Language: English  Hours: Mon - Thu 8:30 AM - 9:00 PM , Fri 8:30 AM - 5:00 PM , Sat 8:00 AM - 4:00 PM  Fees: Insurance, Self Pay, Sliding Fee   Phone: (937) 836-5875 Email: [email or web address] Website: [email or web address]     6  [facility] - 24-Hour Mental Health Practitioner Distance: 10.52 miles      COVID-19 Status: Regular Operations, COVID-19 Status: Phone/Virtual   [address] [place] [address]  Language: English, Hmong  Hours: Mon - Fri 8:00 AM - 4:30 PM  Fees: Insurance, Self Pay   Phone: (497) 167-4157 Email: info@[employer] Website: http://www.[employer]/     Mental health support group  7  [facility] Distance: 5.26 miles      COVID-19 Status: Phone/Virtual   [address] [address] [place] [address]  Language: English  Hours: Mon - Fri 9:00 AM - 5:00 PM Appt. Only  Fees: Free   Phone: (107) 866-1287 Email: [email or web address] Website: [email or web address]     8  [facility] (Main Office) Distance: 10.47 miles      COVID-19 Status: Phone/Virtual   [address] [place] [address]  Language: English  Hours: Mon - Fri 9:00 AM - 5:00 PM  Fees: Free   Phone: (459) 867-5337 Email: info@[employer].org Website: http://[employer].org          Important Numbers & Websites       Emergency Services   911  City Services   311  Poison Control   (341) 968-3832  Suicide Prevention Lifeline   (516) 854-7072 (TALK)  Child Abuse Hotline   (545) 554-1713 (4-A-Child)  Sexual Assault Hotline   (444) 992-3023 (HOPE)  National Runaway Safeline   (157) 505-1374 (RUNAWAY)  All-Options Talkline   (786) 823-5969  Substance Abuse Referral   (549) 406-2957 (HELP)

## 2022-09-10 ENCOUNTER — HEALTH MAINTENANCE LETTER (OUTPATIENT)
Age: 42
End: 2022-09-10

## 2022-11-10 ENCOUNTER — TELEPHONE (OUTPATIENT)
Dept: FAMILY MEDICINE | Facility: CLINIC | Age: 42
End: 2022-11-10

## 2022-11-25 ASSESSMENT — ANXIETY QUESTIONNAIRES
7. FEELING AFRAID AS IF SOMETHING AWFUL MIGHT HAPPEN: MORE THAN HALF THE DAYS
5. BEING SO RESTLESS THAT IT IS HARD TO SIT STILL: NEARLY EVERY DAY
GAD7 TOTAL SCORE: 15
4. TROUBLE RELAXING: NEARLY EVERY DAY
IF YOU CHECKED OFF ANY PROBLEMS ON THIS QUESTIONNAIRE, HOW DIFFICULT HAVE THESE PROBLEMS MADE IT FOR YOU TO DO YOUR WORK, TAKE CARE OF THINGS AT HOME, OR GET ALONG WITH OTHER PEOPLE: SOMEWHAT DIFFICULT
3. WORRYING TOO MUCH ABOUT DIFFERENT THINGS: SEVERAL DAYS
6. BECOMING EASILY ANNOYED OR IRRITABLE: SEVERAL DAYS
2. NOT BEING ABLE TO STOP OR CONTROL WORRYING: MORE THAN HALF THE DAYS
1. FEELING NERVOUS, ANXIOUS, OR ON EDGE: NEARLY EVERY DAY

## 2022-11-25 ASSESSMENT — PATIENT HEALTH QUESTIONNAIRE - PHQ9: SUM OF ALL RESPONSES TO PHQ QUESTIONS 1-9: 11

## 2022-12-01 ENCOUNTER — ALLIED HEALTH/NURSE VISIT (OUTPATIENT)
Dept: FAMILY MEDICINE | Facility: CLINIC | Age: 42
End: 2022-12-01
Payer: COMMERCIAL

## 2022-12-01 DIAGNOSIS — Z23 NEED FOR INFLUENZA VACCINATION: Primary | ICD-10-CM

## 2022-12-01 PROCEDURE — 90686 IIV4 VACC NO PRSV 0.5 ML IM: CPT

## 2022-12-01 PROCEDURE — 90471 IMMUNIZATION ADMIN: CPT

## 2022-12-01 PROCEDURE — 99207 PR NO CHARGE NURSE ONLY: CPT

## 2022-12-05 ASSESSMENT — ANXIETY QUESTIONNAIRES
GAD7 TOTAL SCORE: 15
GAD7 TOTAL SCORE: 15
7. FEELING AFRAID AS IF SOMETHING AWFUL MIGHT HAPPEN: MORE THAN HALF THE DAYS
8. IF YOU CHECKED OFF ANY PROBLEMS, HOW DIFFICULT HAVE THESE MADE IT FOR YOU TO DO YOUR WORK, TAKE CARE OF THINGS AT HOME, OR GET ALONG WITH OTHER PEOPLE?: SOMEWHAT DIFFICULT

## 2022-12-05 ASSESSMENT — PATIENT HEALTH QUESTIONNAIRE - PHQ9
SUM OF ALL RESPONSES TO PHQ QUESTIONS 1-9: 11
10. IF YOU CHECKED OFF ANY PROBLEMS, HOW DIFFICULT HAVE THESE PROBLEMS MADE IT FOR YOU TO DO YOUR WORK, TAKE CARE OF THINGS AT HOME, OR GET ALONG WITH OTHER PEOPLE: SOMEWHAT DIFFICULT

## 2022-12-09 ENCOUNTER — VIRTUAL VISIT (OUTPATIENT)
Dept: FAMILY MEDICINE | Facility: CLINIC | Age: 42
End: 2022-12-09
Payer: COMMERCIAL

## 2022-12-09 DIAGNOSIS — J32.9 RHINOSINUSITIS: ICD-10-CM

## 2022-12-09 DIAGNOSIS — F41.9 ANXIETY: Primary | ICD-10-CM

## 2022-12-09 DIAGNOSIS — F32.1 CURRENT MODERATE EPISODE OF MAJOR DEPRESSIVE DISORDER WITHOUT PRIOR EPISODE (H): ICD-10-CM

## 2022-12-09 PROCEDURE — 99214 OFFICE O/P EST MOD 30 MIN: CPT | Mod: GT | Performed by: PHYSICIAN ASSISTANT

## 2022-12-09 RX ORDER — SERTRALINE HYDROCHLORIDE 100 MG/1
100 TABLET, FILM COATED ORAL DAILY
Qty: 90 TABLET | Refills: 0 | Status: SHIPPED | OUTPATIENT
Start: 2022-12-09 | End: 2023-03-19

## 2022-12-09 ASSESSMENT — PATIENT HEALTH QUESTIONNAIRE - PHQ9
10. IF YOU CHECKED OFF ANY PROBLEMS, HOW DIFFICULT HAVE THESE PROBLEMS MADE IT FOR YOU TO DO YOUR WORK, TAKE CARE OF THINGS AT HOME, OR GET ALONG WITH OTHER PEOPLE: SOMEWHAT DIFFICULT
SUM OF ALL RESPONSES TO PHQ QUESTIONS 1-9: 11

## 2022-12-09 ASSESSMENT — ANXIETY QUESTIONNAIRES: GAD7 TOTAL SCORE: 15

## 2022-12-09 NOTE — PROGRESS NOTES
"Jocelyn is a 42 year old who is being evaluated via a billable video visit.      How would you like to obtain your AVS? MyChart  If the video visit is dropped, the invitation should be resent by: Text to cell phone: [phone]  Will anyone else be joining your video visit? No      Assessment & Plan     Anxiety  ]Current moderate episode of major depressive disorder without prior episode (H)  Symptoms improved but room for improvement. Still with rumination, mostly at night but did not like hydroxyzine. Therapy going well. Reasonable to trial increased dosing. Follow-up no later thatn 6 months and continue with therapy  - sertraline (ZOLOFT) 100 MG tablet; Take 1 tablet (100 mg) by mouth daily    Rhinosinusitis  SHe will take doxycycline that she was previously prescribed but never used. Continue otc therapies as well     BMI:   Estimated body mass index is 45.39 kg/m  as calculated from the following:    Height as of 5/18/22: 1.689 m (5' 6.5\").    Weight as of 5/18/22: 129.5 kg (285 lb 8 oz).     Return in about 6 months (around 6/9/2023).    Lele Perera PA-C  [facility]    Alistair Banks is a 42 year old, presenting for the following health issues:  No chief complaint on file.       History of Present Illness       Mental Health Follow-up:  Patient presents to follow-up on Depression & Anxiety.Patient's depression since last visit has been:  Better  The patient is not having other symptoms associated with depression.  Patient's anxiety since last visit has been:  No change  The patient is not having other symptoms associated with anxiety.  Any significant life events: health concerns  Patient is feeling anxious or having panic attacks.  Patient has no concerns about alcohol or drug use.    She eats 2-3 servings of fruits and vegetables daily.She consumes 2 sweetened beverage(s) daily.She exercises with enough effort to increase her heart rate 10 to 19 minutes per day.  She " exercises with enough effort to increase her heart rate 3 or less days per week.   She is taking medications regularly.    Today's PHQ-9         PHQ-9 Total Score: 11    PHQ-9 Q9 Thoughts of better off dead/self-harm past 2 weeks :   Not at all    How difficult have these problems made it for you to do your work, take care of things at home, or get along with other people: Somewhat difficult  Today's RAISSA-7 Score: 15       Depression and Anxiety Follow-Up    How are you doing with your depression since your last visit? Improved     How are you doing with your anxiety since your last visit?  No change    Are you having other symptoms that might be associated with depression or anxiety? No    Have you had a significant life event? Health Concerns     Do you have any concerns with your use of alcohol or other drugs? No    Social History     Tobacco Use     Smoking status: Former     Smokeless tobacco: Never     Tobacco comments:     passive smoker in her 20's   Vaping Use     Vaping Use: Never used   Substance Use Topics     Alcohol use: Yes     Comment: occasionally     Drug use: No     PHQ 11/25/2022 11/25/2022 11/25/2022   PHQ-9 Total Score 11 11 11   Q9: Thoughts of better off dead/self-harm past 2 weeks Not at all Not at all Not at all     RAISSA-7 SCORE 11/25/2022 11/25/2022 11/25/2022   Total Score - - 15 (severe anxiety)   Total Score 15 15 15     Last PHQ-9 11/25/2022   1.  Little interest or pleasure in doing things 1   2.  Feeling down, depressed, or hopeless 1   3.  Trouble falling or staying asleep, or sleeping too much 2   4.  Feeling tired or having little energy 1   5.  Poor appetite or overeating 1   6.  Feeling bad about yourself 2   7.  Trouble concentrating 2   8.  Moving slowly or restless 1   Q9: Thoughts of better off dead/self-harm past 2 weeks 0   PHQ-9 Total Score 11   Difficulty at work, home, or with people -     RAISSA-7  11/25/2022   1. Feeling nervous, anxious, or on edge 3   2. Not being able  to stop or control worrying 2   3. Worrying too much about different things 1   4. Trouble relaxing 3   5. Being so restless that it is hard to sit still 3   6. Becoming easily annoyed or irritable 1   7. Feeling afraid, as if something awful might happen 2   RAISSA-7 Total Score 15   If you checked any problems, how difficult have they made it for you to do your work, take care of things at home, or get along with other people? Somewhat difficult     Jocelyn Weber is a 42 year old female who presents today for sertraline check  She denies any side effects to the medication  Also started therapy as well (in August)  Using hydroxyzine prn; not tolerating and not finding very effective   --has not needed during the day time  Therapy has helped more with the day to day anxiety  Sertraline has helped more of the low mood symptoms; more hopeful    Acute Illness  Acute illness concerns: ear pain   Onset/Duration: 1 week  Symptoms:  Fever: No  Chills/Sweats: No  Headache (location?): YES  Sinus Pressure: YES  Conjunctivitis:  No  Ear Pain: YES: left  Rhinorrhea: YES  Congestion: YES  Sore Throat: YES  Cough: no  Wheeze: No  Decreased Appetite: YES  Nausea: No  Vomiting: No  Diarrhea: No  Dysuria/Freq.: No  Dysuria or Hematuria: No  Fatigue/Achiness: No  Sick/Strep Exposure: No  Therapies tried and outcome: none   Patient went to [place] in October and has been having sinus and ear symptoms since then  Worsened the past few weeks  Lots of congestions/pressure -- back of left side of throat and left ear painful  There is a lot of tooth pain      Review of Systems   Constitutional, HEENT, cardiovascular, pulmonary, gi and gu systems are negative, except as otherwise noted.      Objective           Vitals:  No vitals were obtained today due to virtual visit.    Physical Exam   GENERAL: Healthy, alert and no distress  EYES: Eyes grossly normal to inspection.  No discharge or erythema, or obvious scleral/conjunctival  abnormalities.  RESP: No audible wheeze, cough, or visible cyanosis.  No visible retractions or increased work of breathing.    SKIN: Visible skin clear. No significant rash, abnormal pigmentation or lesions.  NEURO: Cranial nerves grossly intact.  Mentation and speech appropriate for age.  PSYCH: Mentation appears normal, affect normal/bright, judgement and insight intact, normal speech and appearance well-groomed.                Video-Visit Details    Video Start Time: 1:01 PM    Type of service:  Video Visit    Video End Time:1:26 PM    Originating Location (pt. Location): Home        Distant Location (provider location):  Off-site    Platform used for Video Visit: Divided

## 2022-12-14 ENCOUNTER — OFFICE VISIT (OUTPATIENT)
Dept: ORTHOPEDICS | Facility: CLINIC | Age: 42
End: 2022-12-14
Payer: COMMERCIAL

## 2022-12-14 ENCOUNTER — ANCILLARY PROCEDURE (OUTPATIENT)
Dept: GENERAL RADIOLOGY | Facility: CLINIC | Age: 42
End: 2022-12-14
Attending: STUDENT IN AN ORGANIZED HEALTH CARE EDUCATION/TRAINING PROGRAM
Payer: COMMERCIAL

## 2022-12-14 VITALS — BODY MASS INDEX: 42.38 KG/M2 | HEIGHT: 67 IN | WEIGHT: 270 LBS

## 2022-12-14 DIAGNOSIS — S69.92XA LEFT WRIST INJURY, INITIAL ENCOUNTER: Primary | ICD-10-CM

## 2022-12-14 DIAGNOSIS — S50.02XA CONTUSION OF LEFT ELBOW, INITIAL ENCOUNTER: ICD-10-CM

## 2022-12-14 DIAGNOSIS — S69.92XA LEFT WRIST INJURY, INITIAL ENCOUNTER: ICD-10-CM

## 2022-12-14 PROCEDURE — 99204 OFFICE O/P NEW MOD 45 MIN: CPT | Performed by: STUDENT IN AN ORGANIZED HEALTH CARE EDUCATION/TRAINING PROGRAM

## 2022-12-14 PROCEDURE — 73110 X-RAY EXAM OF WRIST: CPT | Mod: TC | Performed by: RADIOLOGY

## 2022-12-14 NOTE — LETTER
12/14/2022         RE: Jocelyn Weber  [address] [place] [address]        Dear Colleague,    Thank you for referring your patient, Jocelyn Weber, to the [facility] [place]. Please see a copy of my visit note below.    ASSESSMENT & PLAN       1. Left wrist injury, initial encounter    2. Contusion of left elbow, initial encounter      Jocelyn Weber is a 42 year old right-handed female presenting for evaluation of acute left wrist and elbow pain after a fall off of a ladder 2.5 weeks ago (DOI 11/27/22). Initial X-rays in Urgent Care were negative immediately after the injury. Repeat wrist X-rays were performed today to rule out occult scaphoid or radius fracture. There is no evidence of scaphoid fracture. There is a linear lucency about the volar distal radius on the oblique view of her wrist X-rays today, although favor nutrient vessel with absence of cortical disruption or periosteal reaction on ultrasound. There is a firm hematoma over the volar wrist. Bedside ultrasound was consistent with hematoma without evidence of underlying tendon or bony injury. Overall evaluation is consistent with soft tissue contusion. We reviewed treatment options inclusive of activity modifications, bracing, NSAIDS, formal Hand Therapy or advanced imaging (MRI).    At this time she chooses to proceed with a trial of the following:  - Continue the velcro wrist brace consistently for the next week then wean as tolerated.  - Work on gentle wrist and elbow range of motion.  - Heat versus ice as needed for pain.   - Activity as tolerated based on pain. Avoid activities that provoke your symptoms.   - Topical diclofenac (Voltaren) gel may be applied to the painful area 3-4 times per day for up to 2 weeks, then reduce to as-needed. This is an over-the-counter topical non-steroidal anti-inflammatory (NSAID) medication. Do not use with other NSAIDS like ibuprofen or  "advil.    Please schedule a follow up appointment to see me after another 2-3 weeks if pain persists, or sooner as needed for persistence or worsening of pain. You may call our direct clinic number ([phone]) at any time with questions or concerns.    Keila Ramirez MD, [facility]    -----    SUBJECTIVE  Jocelyn Weber is a/an 42 year old Right handed female who is seen as a self referral for evaluation of left elbow and left wrist pain. The patient is seen by themselves.    Onset: 11/27/22 - 2.5 weeks ago. Patient describes injury as she fell off a ladder, approx 7 ft, landing on an outstretched left hand. She noted immediate pain of the left wrist and elbow. She was seen in [facility] where X-rays of the wrist and elbow were read as normal. She was instructed to wear a velcro left wrist brace and an arm sling for 5 days and follow up in clinic for repeat X-rays to rule out occult wrist fracture. Since that time, the medial elbow pain and bruising have near resolved. She noted intermittent numbness and tingling (of the \"funny bone\") initially but this has resolved. Left volar/radial wrist swelling, bruising and pain have improved although she continues to note a firm tender hematoma about the volar/radial wrist. Notes intermittent wrist pain with loading (ie pushing off of seat).  Location of Pain: left radial and volar wrist, left medial elbow  Rating of Pain at worst: 6/10  Rating of Pain Currently: 1/10  Worsened by: wrist extension, pushing, elbow tender to touch  Better with: rest / activity avoidance, Tylenol  Treatments tried: rest/activity avoidance, Tylenol, ibuprofen, previous imaging (xray 11/27/22 - [facility]) and wrist brace (wore for 5 days), sling (wore for 5 days)  Associated symptoms: swelling (new) and bruising in left wrist, tingling in left elbow (no radiation to forearm or hand)  Orthopedic history: NO  Relevant surgical " "history: NO  Social history: works - desk job    Past Medical History:   Diagnosis Date     Obesity      Social History     Socioeconomic History     Marital status:    Occupational History     Occupation: [employer],      Employer: [employer]   Tobacco Use     Smoking status: Former     Smokeless tobacco: Never     Tobacco comments:     passive smoker in her 20's   Vaping Use     Vaping Use: Never used   Substance and Sexual Activity     Alcohol use: Yes     Comment: occasionally     Drug use: No     Sexual activity: Not Currently   Other Topics Concern     Parent/sibling w/ CABG, MI or angioplasty before 65F 55M? No   Social History Narrative    Caffeine intake/servings daily - 1    Calcium intake/servings daily - 3    Exercise 2 times weekly - describe     Sunscreen used - Yes    Seatbelts used - Yes    Guns stored in the home - Yes    Self Breast Exam - Yes    Pap test up to date -  No due today     Eye exam up to date -  Yes    Dental exam up to date -  Yes    DEXA scan up to date -  Not Applicable    Flex Sig/Colonoscopy up to date -  Not Applicable    Mammography up to date -  Not Applicable    Immunizations reviewed and up to date - Yes    Abuse: Current or Past (Physical, Sexual or Emotional) -  No     Do you feel safe in your environment - Yes    Do you cope well with stress - Yes    Do you suffer from insomnia - No    Last updated by: ALO Del Rosario CNM                Patient's past medical, surgical, social, and family histories were reviewed today and no changes are noted.    REVIEW OF SYSTEMS:  10 point ROS is negative other than symptoms noted above in HPI, Past Medical History or as stated below  Constitutional: NEGATIVE for fever, chills, change in weight  Skin: NEGATIVE for worrisome rashes, moles or lesions  GI/: NEGATIVE for bowel or bladder changes  Neuro: NEGATIVE for weakness, dizziness or paresthesias    OBJECTIVE:  Ht 1.689 m (5' 6.5\")   " Wt 122.5 kg (270 lb)   BMI 42.93 kg/m     General: healthy, alert and in no distress  HEENT: no scleral icterus or conjunctival erythema  Skin: no suspicious lesions or rash. No jaundice.  CV: regular rhythm by palpation  Resp: normal respiratory effort without conversational dyspnea   Psych: normal mood and affect  Gait: normal steady gait with appropriate coordination and balance  Neuro: Normal sensory exam of bilateral hands.   MSK:  LEFT ELBOW  Inspection:    No swelling, bruising, discoloration, or obvious deformity or asymmetry  Palpation:    Tender about the medial epicondyle. Remainder of bony, ligamentous and tendinous landmarks are nontender.    Crepitus is Absent  Range of Motion:     Elbow: Extension full / flexion full / pronation full / supination full  Strength:    No deficits in elbow flexion, extension, pronation, or supination.  Special Tests:    Positive: None    Negative: Pain with resisted wrist extension, pain with resisted middle finger extension, pain with resisted wrist flexion, cubital tunnel (ulnar Tinel's)    LEFT HAND & WRIST  Inspection:    Slight swelling and resolving bruising about the volar aspect of the distal radius. No discoloration, or obvious deformity or asymmetry  Palpation:    Tender about the volar aspect of the distal radius with approx 1 x 2 cm soft tissue mass about the volar wrist. This is tender to palpation. Minimal overlying bruising. No erythema, warmth, skin breakdown. No tenderness about the scaphoid. Remainder of bony and ligamentous line marks are nontender.    Crepitus is Absent    Metacarpals: normal    Thumb: normal    Fingers: normal  Range of Motion:    Full active flexion and extension at MCP, PIP, and DIP joints; normal finger cascade without malrotation.      Full wrist flexion, extension, pronation, supination, and ulnar/radial deviation (provokes wrist pain).  Strength:    No deficits in wrist flexion, extension, ulnar/radial deviation (+ volar/radial  pain).    Hand/finger extension full, flexion full, abduction full, adduction full, opposition full    Independent visualization of the below image:  Recent Results (from the past 24 hour(s))   XR Wrist Left G/E 3 Views    Narrative    XR WRIST LEFT G/E 3 VIEWS 12/14/2022 8:48 AM     HISTORY: 2 weeks from left wrist FOOSH injury, eval for scaphoid  fracture; Left wrist injury, initial encounter    COMPARISON: None.      Impression    IMPRESSION: Normal.    PATI FERNANDEZ MD         SYSTEM ID:  [id number]       XR ELBOW 3 VIEWS LEFT   LOCATION: [facility] [place]   DATE/TIME: 11/27/2022 3:46 PM     INDICATION: Left elbow pain.   COMPARISON: None.     IMPRESSION:   Normal joint spaces and alignment. No fracture or joint effusion.    EXAM: XR WRIST 3 VIEWS LEFT   LOCATION: [facility] [place]   DATE/TIME: 11/27/2022 3:45 PM     INDICATION: Left wrist pain.   COMPARISON: None.     IMPRESSION:   Normal joint spaces and alignment. No fracture.        Keila Ramirez MD [facility]      Again, thank you for allowing me to participate in the care of your patient.        Sincerely,        Keila Ramirez MD

## 2022-12-14 NOTE — PROGRESS NOTES
ASSESSMENT & PLAN       1. Left wrist injury, initial encounter    2. Contusion of left elbow, initial encounter      Jocelyn Weber is a 42 year old right-handed female presenting for evaluation of acute left wrist and elbow pain after a fall off of a ladder 2.5 weeks ago (DOI 11/27/22). Initial X-rays in Urgent Care were negative immediately after the injury. Repeat wrist X-rays were performed today to rule out occult scaphoid or radius fracture. There is no evidence of scaphoid fracture. There is a linear lucency about the volar distal radius on the oblique view of her wrist X-rays today, although favor nutrient vessel with absence of cortical disruption or periosteal reaction on ultrasound. There is a firm hematoma over the volar wrist. Bedside ultrasound was consistent with hematoma without evidence of underlying tendon or bony injury. Overall evaluation is consistent with soft tissue contusion. We reviewed treatment options inclusive of activity modifications, bracing, NSAIDS, formal Hand Therapy or advanced imaging (MRI).    At this time she chooses to proceed with a trial of the following:  - Continue the velcro wrist brace consistently for the next week then wean as tolerated.  - Work on gentle wrist and elbow range of motion.  - Heat versus ice as needed for pain.   - Activity as tolerated based on pain. Avoid activities that provoke your symptoms.   - Topical diclofenac (Voltaren) gel may be applied to the painful area 3-4 times per day for up to 2 weeks, then reduce to as-needed. This is an over-the-counter topical non-steroidal anti-inflammatory (NSAID) medication. Do not use with other NSAIDS like ibuprofen or advil.    Please schedule a follow up appointment to see me after another 2-3 weeks if pain persists, or sooner as needed for persistence or worsening of pain. You may call our direct clinic number ([phone]) at any time with questions or concerns.    Keila Ramirez MD,  "CAQSM  [facility]    -----    SUBJECTIVE  Jocelyn Weber is a/an 42 year old Right handed female who is seen as a self referral for evaluation of left elbow and left wrist pain. The patient is seen by themselves.    Onset: 11/27/22 - 2.5 weeks ago. Patient describes injury as she fell off a ladder, approx 7 ft, landing on an outstretched left hand. She noted immediate pain of the left wrist and elbow. She was seen in [facility] where X-rays of the wrist and elbow were read as normal. She was instructed to wear a velcro left wrist brace and an arm sling for 5 days and follow up in clinic for repeat X-rays to rule out occult wrist fracture. Since that time, the medial elbow pain and bruising have near resolved. She noted intermittent numbness and tingling (of the \"funny bone\") initially but this has resolved. Left volar/radial wrist swelling, bruising and pain have improved although she continues to note a firm tender hematoma about the volar/radial wrist. Notes intermittent wrist pain with loading (ie pushing off of seat).  Location of Pain: left radial and volar wrist, left medial elbow  Rating of Pain at worst: 6/10  Rating of Pain Currently: 1/10  Worsened by: wrist extension, pushing, elbow tender to touch  Better with: rest / activity avoidance, Tylenol  Treatments tried: rest/activity avoidance, Tylenol, ibuprofen, previous imaging (xray 11/27/22 - [facility]) and wrist brace (wore for 5 days), sling (wore for 5 days)  Associated symptoms: swelling (new) and bruising in left wrist, tingling in left elbow (no radiation to forearm or hand)  Orthopedic history: NO  Relevant surgical history: NO  Social history: works - desk job    Past Medical History:   Diagnosis Date     Obesity      Social History     Socioeconomic History     Marital status:    Occupational History     Occupation: [employer],      Employer: [employer]   Tobacco Use " "    Smoking status: Former     Smokeless tobacco: Never     Tobacco comments:     passive smoker in her 20's   Vaping Use     Vaping Use: Never used   Substance and Sexual Activity     Alcohol use: Yes     Comment: occasionally     Drug use: No     Sexual activity: Not Currently   Other Topics Concern     Parent/sibling w/ CABG, MI or angioplasty before 65F 55M? No   Social History Narrative    Caffeine intake/servings daily - 1    Calcium intake/servings daily - 3    Exercise 2 times weekly - describe     Sunscreen used - Yes    Seatbelts used - Yes    Guns stored in the home - Yes    Self Breast Exam - Yes    Pap test up to date -  No due today     Eye exam up to date -  Yes    Dental exam up to date -  Yes    DEXA scan up to date -  Not Applicable    Flex Sig/Colonoscopy up to date -  Not Applicable    Mammography up to date -  Not Applicable    Immunizations reviewed and up to date - Yes    Abuse: Current or Past (Physical, Sexual or Emotional) -  No     Do you feel safe in your environment - Yes    Do you cope well with stress - Yes    Do you suffer from insomnia - No    Last updated by: ALO Del Rosario CNM                Patient's past medical, surgical, social, and family histories were reviewed today and no changes are noted.    REVIEW OF SYSTEMS:  10 point ROS is negative other than symptoms noted above in HPI, Past Medical History or as stated below  Constitutional: NEGATIVE for fever, chills, change in weight  Skin: NEGATIVE for worrisome rashes, moles or lesions  GI/: NEGATIVE for bowel or bladder changes  Neuro: NEGATIVE for weakness, dizziness or paresthesias    OBJECTIVE:  Ht 1.689 m (5' 6.5\")   Wt 122.5 kg (270 lb)   BMI 42.93 kg/m     General: healthy, alert and in no distress  HEENT: no scleral icterus or conjunctival erythema  Skin: no suspicious lesions or rash. No jaundice.  CV: regular rhythm by palpation  Resp: normal respiratory effort without conversational dyspnea "   Psych: normal mood and affect  Gait: normal steady gait with appropriate coordination and balance  Neuro: Normal sensory exam of bilateral hands.   MSK:  LEFT ELBOW  Inspection:    No swelling, bruising, discoloration, or obvious deformity or asymmetry  Palpation:    Tender about the medial epicondyle. Remainder of bony, ligamentous and tendinous landmarks are nontender.    Crepitus is Absent  Range of Motion:     Elbow: Extension full / flexion full / pronation full / supination full  Strength:    No deficits in elbow flexion, extension, pronation, or supination.  Special Tests:    Positive: None    Negative: Pain with resisted wrist extension, pain with resisted middle finger extension, pain with resisted wrist flexion, cubital tunnel (ulnar Tinel's)    LEFT HAND & WRIST  Inspection:    Slight swelling and resolving bruising about the volar aspect of the distal radius. No discoloration, or obvious deformity or asymmetry  Palpation:    Tender about the volar aspect of the distal radius with approx 1 x 2 cm soft tissue mass about the volar wrist. This is tender to palpation. Minimal overlying bruising. No erythema, warmth, skin breakdown. No tenderness about the scaphoid. Remainder of bony and ligamentous line marks are nontender.    Crepitus is Absent    Metacarpals: normal    Thumb: normal    Fingers: normal  Range of Motion:    Full active flexion and extension at MCP, PIP, and DIP joints; normal finger cascade without malrotation.      Full wrist flexion, extension, pronation, supination, and ulnar/radial deviation (provokes wrist pain).  Strength:    No deficits in wrist flexion, extension, ulnar/radial deviation (+ volar/radial pain).    Hand/finger extension full, flexion full, abduction full, adduction full, opposition full    Independent visualization of the below image:  Recent Results (from the past 24 hour(s))   XR Wrist Left G/E 3 Views    Narrative    XR WRIST LEFT G/E 3 VIEWS 12/14/2022 8:48 AM      HISTORY: 2 weeks from left wrist FOOSH injury, eval for scaphoid  fracture; Left wrist injury, initial encounter    COMPARISON: None.      Impression    IMPRESSION: Normal.    PATI FERNANDEZ MD         SYSTEM ID:  [id number]       XR ELBOW 3 VIEWS LEFT   LOCATION: [facility]   DATE/TIME: 11/27/2022 3:46 PM     INDICATION: Left elbow pain.   COMPARISON: None.     IMPRESSION:   Normal joint spaces and alignment. No fracture or joint effusion.    EXAM: XR WRIST 3 VIEWS LEFT   LOCATION: [facility] [place]   DATE/TIME: 11/27/2022 3:45 PM     INDICATION: Left wrist pain.   COMPARISON: None.     IMPRESSION:   Normal joint spaces and alignment. No fracture.        Keila Ramirez MD [facility]

## 2022-12-14 NOTE — PATIENT INSTRUCTIONS
1. Left wrist injury, initial encounter    2. Contusion of left elbow, initial encounter      Jocelyn Weber is a 42 year old right-handed female presenting for evaluation of acute left wrist and elbow pain after a fall off of a ladder 2.5 weeks ago (DOI 11/27/22). Initial X-rays in Urgent Care were negative immediately after the injury. Repeat wrist X-rays were performed today to rule out occult scaphoid or radius fracture. There is a firm hematoma over the volar wrist. Bedside ultrasound was consistent with hematoma without evidence of underlying tendon or bony injury. Overall evaluation is consistent with soft tissue contusion. We reviewed treatment options inclusive of activity modifications, bracing, NSAIDS, formal Hand Therapy or advanced imaging (MRI).    At this time she chooses to proceed with a trial of the following:  - Continue the velcro wrist brace consistently for the next week then wean as tolerated.  - Work on gentle wrist and elbow range of motion.  - Heat versus ice as needed for pain.   - Activity as tolerated based on pain. Avoid activities that provoke your symptoms.   - Topical diclofenac (Voltaren) gel may be applied to the painful area 3-4 times per day for up to 2 weeks, then reduce to as-needed. This is an over-the-counter topical non-steroidal anti-inflammatory (NSAID) medication. Do not use with other NSAIDS like ibuprofen or advil.    Please schedule a follow up appointment to see me after another 2-3 weeks if pain persists, or sooner as needed for persistence or worsening of pain. You may call our direct clinic number ([phone]) at any time with questions or concerns.    Keila Ramirez MD, [facility]

## 2023-03-19 ENCOUNTER — MYC REFILL (OUTPATIENT)
Dept: FAMILY MEDICINE | Facility: CLINIC | Age: 43
End: 2023-03-19
Payer: COMMERCIAL

## 2023-03-19 DIAGNOSIS — F41.9 ANXIETY: ICD-10-CM

## 2023-03-19 DIAGNOSIS — F32.1 CURRENT MODERATE EPISODE OF MAJOR DEPRESSIVE DISORDER WITHOUT PRIOR EPISODE (H): ICD-10-CM

## 2023-03-21 RX ORDER — SERTRALINE HYDROCHLORIDE 100 MG/1
100 TABLET, FILM COATED ORAL DAILY
Qty: 90 TABLET | Refills: 0 | Status: SHIPPED | OUTPATIENT
Start: 2023-03-21 | End: 2023-08-15

## 2023-03-21 NOTE — TELEPHONE ENCOUNTER
Routing refill request to provider for review/approval because:  See pt comment  Elevated PHQ-9  PHQ 11/25/2022 11/25/2022 11/25/2022   PHQ-9 Total Score 11 11 11   Q9: Thoughts of better off dead/self-harm past 2 weeks Not at all Not at all Not at all      Dalia Harmon RN, BSN  [facility]

## 2023-03-29 ASSESSMENT — ANXIETY QUESTIONNAIRES
GAD7 TOTAL SCORE: 12
5. BEING SO RESTLESS THAT IT IS HARD TO SIT STILL: MORE THAN HALF THE DAYS
4. TROUBLE RELAXING: MORE THAN HALF THE DAYS
1. FEELING NERVOUS, ANXIOUS, OR ON EDGE: MORE THAN HALF THE DAYS
3. WORRYING TOO MUCH ABOUT DIFFERENT THINGS: SEVERAL DAYS
7. FEELING AFRAID AS IF SOMETHING AWFUL MIGHT HAPPEN: MORE THAN HALF THE DAYS
IF YOU CHECKED OFF ANY PROBLEMS ON THIS QUESTIONNAIRE, HOW DIFFICULT HAVE THESE PROBLEMS MADE IT FOR YOU TO DO YOUR WORK, TAKE CARE OF THINGS AT HOME, OR GET ALONG WITH OTHER PEOPLE: SOMEWHAT DIFFICULT
6. BECOMING EASILY ANNOYED OR IRRITABLE: MORE THAN HALF THE DAYS
2. NOT BEING ABLE TO STOP OR CONTROL WORRYING: SEVERAL DAYS
8. IF YOU CHECKED OFF ANY PROBLEMS, HOW DIFFICULT HAVE THESE MADE IT FOR YOU TO DO YOUR WORK, TAKE CARE OF THINGS AT HOME, OR GET ALONG WITH OTHER PEOPLE?: SOMEWHAT DIFFICULT
GAD7 TOTAL SCORE: 12
7. FEELING AFRAID AS IF SOMETHING AWFUL MIGHT HAPPEN: MORE THAN HALF THE DAYS
GAD7 TOTAL SCORE: 12

## 2023-03-29 ASSESSMENT — PATIENT HEALTH QUESTIONNAIRE - PHQ9
SUM OF ALL RESPONSES TO PHQ QUESTIONS 1-9: 12
SUM OF ALL RESPONSES TO PHQ QUESTIONS 1-9: 12
10. IF YOU CHECKED OFF ANY PROBLEMS, HOW DIFFICULT HAVE THESE PROBLEMS MADE IT FOR YOU TO DO YOUR WORK, TAKE CARE OF THINGS AT HOME, OR GET ALONG WITH OTHER PEOPLE: SOMEWHAT DIFFICULT

## 2023-03-30 ENCOUNTER — OFFICE VISIT (OUTPATIENT)
Dept: FAMILY MEDICINE | Facility: CLINIC | Age: 43
End: 2023-03-30
Payer: COMMERCIAL

## 2023-03-30 VITALS
OXYGEN SATURATION: 98 % | SYSTOLIC BLOOD PRESSURE: 110 MMHG | RESPIRATION RATE: 14 BRPM | HEIGHT: 67 IN | TEMPERATURE: 97.6 F | WEIGHT: 280 LBS | HEART RATE: 47 BPM | DIASTOLIC BLOOD PRESSURE: 84 MMHG | BODY MASS INDEX: 43.95 KG/M2

## 2023-03-30 DIAGNOSIS — F41.9 ANXIETY: ICD-10-CM

## 2023-03-30 DIAGNOSIS — L91.8 ACROCHORDON: Primary | ICD-10-CM

## 2023-03-30 DIAGNOSIS — F32.1 CURRENT MODERATE EPISODE OF MAJOR DEPRESSIVE DISORDER WITHOUT PRIOR EPISODE (H): ICD-10-CM

## 2023-03-30 PROCEDURE — 99213 OFFICE O/P EST LOW 20 MIN: CPT | Performed by: PHYSICIAN ASSISTANT

## 2023-03-30 ASSESSMENT — PATIENT HEALTH QUESTIONNAIRE - PHQ9
SUM OF ALL RESPONSES TO PHQ QUESTIONS 1-9: 12
10. IF YOU CHECKED OFF ANY PROBLEMS, HOW DIFFICULT HAVE THESE PROBLEMS MADE IT FOR YOU TO DO YOUR WORK, TAKE CARE OF THINGS AT HOME, OR GET ALONG WITH OTHER PEOPLE: SOMEWHAT DIFFICULT

## 2023-03-30 ASSESSMENT — ANXIETY QUESTIONNAIRES: GAD7 TOTAL SCORE: 12

## 2023-03-30 ASSESSMENT — PAIN SCALES - GENERAL: PAINLEVEL: NO PAIN (0)

## 2023-03-30 NOTE — PROGRESS NOTES
"  Assessment & Plan     Acramyon  She does want full body exam/screen and so can have this tag removed theere as well. It is directly under the bra strap and irritated regularly  - Adult Dermatology Referral; Future    Anxiety  Current moderate episode of major depressive disorder without prior episode (H)  She had a good improvement of mood from the increased sertraline dose but noted worsening leg hyperhydrosis. We'll cut back to 50mg once again and she'll send an update with mood symptoms and side effects. We can consider at that point staying at 50mg, increasing to 75mg or adding buspar. She would not need an initial appt for that as we discussed that today         BMI:   Estimated body mass index is 44.52 kg/m  as calculated from the following:    Height as of this encounter: 1.689 m (5' 6.5\").    Weight as of this encounter: 127 kg (280 lb).       Depression Screening Follow Up    PHQ 3/29/2023   PHQ-9 Total Score 12   Q9: Thoughts of better off dead/self-harm past 2 weeks Not at all       Lele Perera PA-C  [facility] JOSHUA Banks is a 42 year old, presenting for the following health issues:  Recheck Medication  No flowsheet data found  .  History of Present Illness       Mental Health Follow-up:  Patient presents to follow-up on Depression & Anxiety.Patient's depression since last visit has been:  No change  The patient is not having other symptoms associated with depression.  Patient's anxiety since last visit has been:  No change  The patient is not having other symptoms associated with anxiety.  Any significant life events: health concerns  Patient is feeling anxious or having panic attacks.  Patient has no concerns about alcohol or drug use.    She eats 2-3 servings of fruits and vegetables daily.She consumes 3 sweetened beverage(s) daily.She exercises with enough effort to increase her heart rate 10 to 19 minutes per day.  She exercises with enough effort " to increase her heart rate 3 or less days per week.   She is taking medications regularly.    Today's PHQ-9         PHQ-9 Total Score: 12    PHQ-9 Q9 Thoughts of better off dead/self-harm past 2 weeks :   Not at all    How difficult have these problems made it for you to do your work, take care of things at home, or get along with other people: Somewhat difficult  Today's RAISSA-7 Score: 12     Medication Followup of Sertraline    Taking Medication as prescribed: yes    Side Effects:  Sweating on legs     Medication Helping Symptoms:  yes    Depression and Anxiety Follow-Up    How are you doing with your depression since your last visit? No change    How are you doing with your anxiety since your last visit?  No change    Are you having other symptoms that might be associated with depression or anxiety? No    Have you had a significant life event? Health Concerns     Do you have any concerns with your use of alcohol or other drugs? No    Social History     Tobacco Use     Smoking status: Former     Smokeless tobacco: Never     Tobacco comments:     passive smoker in her 20's   Vaping Use     Vaping Use: Never used   Substance Use Topics     Alcohol use: Yes     Comment: occasionally     Drug use: No     PHQ 11/25/2022 11/25/2022 3/29/2023   PHQ-9 Total Score 11 11 12   Q9: Thoughts of better off dead/self-harm past 2 weeks Not at all Not at all Not at all     RAISSA-7 SCORE 11/25/2022 11/25/2022 3/29/2023   Total Score - 15 (severe anxiety) 12 (moderate anxiety)   Total Score 15 15 12     Last PHQ-9 3/29/2023   1.  Little interest or pleasure in doing things 2   2.  Feeling down, depressed, or hopeless 1   3.  Trouble falling or staying asleep, or sleeping too much 3   4.  Feeling tired or having little energy 1   5.  Poor appetite or overeating 1   6.  Feeling bad about yourself 2   7.  Trouble concentrating 1   8.  Moving slowly or restless 1   Q9: Thoughts of better off dead/self-harm past 2 weeks 0   PHQ-9 Total Score  "12   Difficulty at work, home, or with people -     RAISSA-7  3/29/2023   1. Feeling nervous, anxious, or on edge 2   2. Not being able to stop or control worrying 1   3. Worrying too much about different things 1   4. Trouble relaxing 2   5. Being so restless that it is hard to sit still 2   6. Becoming easily annoyed or irritable 2   7. Feeling afraid, as if something awful might happen 2   RAISSA-7 Total Score 12   If you checked any problems, how difficult have they made it for you to do your work, take care of things at home, or get along with other people? Somewhat difficult     Suicide Assessment Five-step Evaluation and Treatment (SAFE-T)        Jocelyn Weber is a 42 year old female who presents today for medication check   At her last virtual visit we increased her sertraline to 100mg  She has overall tolerated the medication well but has developed a NEW side effect of increased sweating of the legs at bed time   She thinks the increased dose has made some improvements (in addition to therapy)  Bumps in the road seem to be less exacerbating of symptoms  She has not had any panic moments since then  Had a few episodes where she was a bit more emotional/teary but these are fewer and far between  No other concerns      Review of Systems   Constitutional, HEENT, cardiovascular, pulmonary, gi and gu systems are negative, except as otherwise noted.      Objective    /84 (BP Location: Right arm, Patient Position: Sitting, Cuff Size: Adult Large)   Pulse (!) 47   Temp 97.6  F (36.4  C) (Tympanic)   Resp 14   Ht 1.689 m (5' 6.5\")   Wt 127 kg (280 lb)   LMP 03/25/2023 (Approximate)   SpO2 98%   BMI 44.52 kg/m    Body mass index is 44.52 kg/m .  Physical Exam   GENERAL: healthy, alert and no distress  SKIN: small spherical stalked papule along the left shoulder, directly under the bra strap  PSYCH: mentation appears normal, affect normal/bright                "

## 2023-03-30 NOTE — PATIENT INSTRUCTIONS
Send a note with about 5 tbs of the sertraline remaining with an update on how the 50 is going (both side effects and mood)

## 2023-05-08 NOTE — PROGRESS NOTES
[facility] Dermatology Note  Encounter Date: May 9, 2023  Office Visit     Dermatology Problem List:  0. NUBs  - L medial shoulder s/p shave bx 5/9/2023  - L medial thigh s/p shave bx 5/9/2023  1. Hx of atypical nevus  - R cheek    Last FBSE: 5/9/2023  ____________________________________________    Assessment & Plan:    # NUB, L medial shoulder ddx inflamed nevus vs other  # NUB, L medial thigh ddx nevus lipomatosus vs neurofibroma vs other  - Shave biopsy today; see procedure note below.    # Cherry angiomas  # Seborrheic keratoses  Discussed the natural history and benign nature of this lesion. Reassurance provided that no additional treatment is necessary.     # Solar lentigines  # Multiple benign nevi  - Monitor for ABCDEs of melanoma   - Continue sun protection - recommend SPF 30 or higher with frequent application   - Return sooner if noticing changing or symptomatic lesions    Procedures Performed:   - Shave biopsy procedure note, location(s): see above. After discussion of benefits and risks including but not limited to bleeding, infection, scar, incomplete removal, recurrence, and non-diagnostic biopsy, written consent and photographs were obtained. The area was cleaned with isopropyl alcohol. 0.5mL of 1% lidocaine with epinephrine was injected to obtain adequate anesthesia of lesion(s). Shave biopsy at site(s) performed. Hemostasis was achieved with aluminium chloride. Petrolatum ointment and a sterile dressing were applied. The patient tolerated the procedure and no complications were noted. The patient was provided with verbal and written post care instructions.     Follow-up: pending pathology, 1 year(s) in-person, or earlier for new or changing lesions    Staff and Scribe:     Scribe Disclosure:  Dawit ESCOBAR, am serving as a scribe to document services personally performed by Galilea Pulido PA-C based on data collection and the provider's statements to me.     Provider  "Disclosure:   The documentation recorded by the scribe accurately reflects the services I personally performed and the decisions made by me.    All risks, benefits and alternatives were discussed with patient.  Patient is in agreement and understands the assessment and plan.  All questions were answered.  Sun Screen Education was given.   Return to Clinic annually or sooner as needed.   Galilea Pulido PA-C   [facility]   ____________________________________________    CC: Skin Check (Skin tag on left shoulder would like removed)    HPI:  Ms. Jocelyn Weber is a(n) 42 year old female who presents today as a return patient for a FBSE. Last seen in dermatology by Dr. Mcneill on 12/5/2014, at which time patient received a skin exam of the face with no further intervention.    Today, patient reviews her history of mole removal, including a \"pre-cancerous\" mole on the R cheek removed in her teens. She used tanning beds and received plenty of sun exposure as a teen, but now tried to avoid the sun and regularly wears sunscreen. She also endorses an irritating spot on her L shoulder that rubs on her clothing.     Patient is otherwise feeling well, without additional skin concerns.    Labs Reviewed:  N/A    Physical Exam:  Vitals: LMP 03/25/2023 (Approximate)   SKIN: Total skin excluding the undergarment areas was performed. The exam included the head/face, neck, both arms, chest, back, abdomen, both legs, digits and/or nails.   - 5 mm tan soft exophytic papule on the L medial shoulder.  - 1 cm soft pink papule on the L medial thigh.  - There are dome shaped bright red papules on the trunk and extremities.   - Multiple regular brown pigmented macules and papules are identified on the trunk and extremities.   - Scattered brown macules on sun exposed areas.  - There are waxy stuck on tan to brown papules on the trunk and extremities.   - No other lesions of concern on areas examined. "             Medications:  Current Outpatient Medications   Medication     biotin 1000 MCG TABS tablet     multivitamin w/minerals (MULTIVITAMINS W/MINERALS) tablet     Omega-3 Fish Oil 500 MG capsule     sertraline (ZOLOFT) 100 MG tablet     No current facility-administered medications for this visit.      Past Medical History:   Patient Active Problem List   Diagnosis     CARDIOVASCULAR SCREENING; LDL GOAL LESS THAN 160     Obesity     Hyperlipidemia LDL goal <130     Need for Tdap vaccination     anti-E alloimmunization     Positive testing for group B Streptococcus     Status post  section     BMI 39.0-39.9,adult     Obesity (BMI 35.0-39.9) with comorbidity (H)     Past Medical History:   Diagnosis Date     Obesity         CC No referring provider defined for this encounter. on close of this encounter.

## 2023-05-09 ENCOUNTER — OFFICE VISIT (OUTPATIENT)
Dept: FAMILY MEDICINE | Facility: CLINIC | Age: 43
End: 2023-05-09
Payer: COMMERCIAL

## 2023-05-09 DIAGNOSIS — L81.4 SOLAR LENTIGO: Primary | ICD-10-CM

## 2023-05-09 DIAGNOSIS — D49.2 NEOPLASM OF UNSPECIFIED BEHAVIOR OF BONE, SOFT TISSUE, AND SKIN: ICD-10-CM

## 2023-05-09 DIAGNOSIS — L82.1 SEBORRHEIC KERATOSIS: ICD-10-CM

## 2023-05-09 DIAGNOSIS — D18.01 CHERRY ANGIOMA: ICD-10-CM

## 2023-05-09 DIAGNOSIS — Z12.83 SKIN CANCER SCREENING: ICD-10-CM

## 2023-05-09 DIAGNOSIS — D22.9 MULTIPLE BENIGN NEVI: ICD-10-CM

## 2023-05-09 PROCEDURE — 99203 OFFICE O/P NEW LOW 30 MIN: CPT | Mod: 25 | Performed by: PHYSICIAN ASSISTANT

## 2023-05-09 PROCEDURE — 88305 TISSUE EXAM BY PATHOLOGIST: CPT | Performed by: DERMATOLOGY

## 2023-05-09 PROCEDURE — 11103 TANGNTL BX SKIN EA SEP/ADDL: CPT | Performed by: PHYSICIAN ASSISTANT

## 2023-05-09 PROCEDURE — 11102 TANGNTL BX SKIN SINGLE LES: CPT | Performed by: PHYSICIAN ASSISTANT

## 2023-05-09 RX ORDER — MULTIPLE VITAMINS W/ MINERALS TAB 9MG-400MCG
1 TAB ORAL DAILY
COMMUNITY
Start: 2023-05-09

## 2023-05-09 RX ORDER — METAPROTERENOL SULFATE 10 MG
TABLET ORAL
COMMUNITY
Start: 2023-05-09

## 2023-05-09 RX ORDER — BIOTIN 1 MG
1000 TABLET ORAL DAILY
COMMUNITY
Start: 2023-05-09 | End: 2023-08-18

## 2023-05-09 ASSESSMENT — PAIN SCALES - GENERAL: PAINLEVEL: NO PAIN (0)

## 2023-05-09 NOTE — LETTER
5/9/2023         RE: Jocelyn Weber  [address] [facility] [address]        Dear Colleague,    Thank you for referring your patient, Jocelyn Weber, to the [facility]. Please see a copy of my visit note below.    [facility] Dermatology Note  Encounter Date: May 9, 2023  Office Visit     Dermatology Problem List:  0. NUBs  - L medial shoulder s/p shave bx 5/9/2023  - L medial thigh s/p shave bx 5/9/2023  1. Hx of atypical nevus  - R cheek    Last FBSE: 5/9/2023  ____________________________________________    Assessment & Plan:    # NUB, L medial shoulder ddx inflamed nevus vs other  # NUB, L medial thigh ddx nevus lipomatosus vs neurofibroma vs other  - Shave biopsy today; see procedure note below.    # Cherry angiomas  # Seborrheic keratoses  Discussed the natural history and benign nature of this lesion. Reassurance provided that no additional treatment is necessary.     # Solar lentigines  # Multiple benign nevi  - Monitor for ABCDEs of melanoma   - Continue sun protection - recommend SPF 30 or higher with frequent application   - Return sooner if noticing changing or symptomatic lesions    Procedures Performed:   - Shave biopsy procedure note, location(s): see above. After discussion of benefits and risks including but not limited to bleeding, infection, scar, incomplete removal, recurrence, and non-diagnostic biopsy, written consent and photographs were obtained. The area was cleaned with isopropyl alcohol. 0.5mL of 1% lidocaine with epinephrine was injected to obtain adequate anesthesia of lesion(s). Shave biopsy at site(s) performed. Hemostasis was achieved with aluminium chloride. Petrolatum ointment and a sterile dressing were applied. The patient tolerated the procedure and no complications were noted. The patient was provided with verbal and written post care instructions.     Follow-up: pending pathology, 1 year(s) in-person, or  "earlier for new or changing lesions    Staff and Scribe:     Scribe Disclosure:  I, Dawit Santosdel, am serving as a scribe to document services personally performed by Galilea Pulido PA-C based on data collection and the provider's statements to me.     Provider Disclosure:   The documentation recorded by the scribe accurately reflects the services I personally performed and the decisions made by me.    All risks, benefits and alternatives were discussed with patient.  Patient is in agreement and understands the assessment and plan.  All questions were answered.  Sun Screen Education was given.   Return to Clinic annually or sooner as needed.   Galilea Pulido PA-C   [facility]   ____________________________________________    CC: Skin Check (Skin tag on left shoulder would like removed)    HPI:  Ms. Jocelyn Weber is a(n) 42 year old female who presents today as a return patient for a FBSE. Last seen in dermatology by Dr. Mcneill on 12/5/2014, at which time patient received a skin exam of the face with no further intervention.    Today, patient reviews her history of mole removal, including a \"pre-cancerous\" mole on the R cheek removed in her teens. She used tanning beds and received plenty of sun exposure as a teen, but now tried to avoid the sun and regularly wears sunscreen. She also endorses an irritating spot on her L shoulder that rubs on her clothing.     Patient is otherwise feeling well, without additional skin concerns.    Labs Reviewed:  N/A    Physical Exam:  Vitals: LMP 03/25/2023 (Approximate)   SKIN: Total skin excluding the undergarment areas was performed. The exam included the head/face, neck, both arms, chest, back, abdomen, both legs, digits and/or nails.   - 5 mm tan soft exophytic papule on the L medial shoulder.  - 1 cm soft pink papule on the L medial thigh.  - There are dome shaped bright red papules on the trunk and extremities.   - Multiple " regular brown pigmented macules and papules are identified on the trunk and extremities.   - Scattered brown macules on sun exposed areas.  - There are waxy stuck on tan to brown papules on the trunk and extremities.   - No other lesions of concern on areas examined.             Medications:  Current Outpatient Medications   Medication     biotin 1000 MCG TABS tablet     multivitamin w/minerals (MULTIVITAMINS W/MINERALS) tablet     Omega-3 Fish Oil 500 MG capsule     sertraline (ZOLOFT) 100 MG tablet     No current facility-administered medications for this visit.      Past Medical History:   Patient Active Problem List   Diagnosis     CARDIOVASCULAR SCREENING; LDL GOAL LESS THAN 160     Obesity     Hyperlipidemia LDL goal <130     Need for Tdap vaccination     anti-E alloimmunization     Positive testing for group B Streptococcus     Status post  section     BMI 39.0-39.9,adult     Obesity (BMI 35.0-39.9) with comorbidity (H)     Past Medical History:   Diagnosis Date     Obesity         CC No referring provider defined for this encounter. on close of this encounter.      Again, thank you for allowing me to participate in the care of your patient.        Sincerely,        Galilea Pulido PA-C

## 2023-05-09 NOTE — PATIENT INSTRUCTIONS
Wound Care After a Biopsy    What is a skin biopsy?  A skin biopsy allows the doctor to examine a very small piece of tissue under the microscope to determine the diagnosis and the best treatment for the skin condition. A local anesthetic (numbing medicine) is injected with a very small needle into the skin area to be tested. A small piece of skin is taken from the area. Sometimes a suture (stitch) is used.     What are the risks of a skin biopsy?  I will experience scar, bleeding, swelling, pain, crusting and redness. I may experience incomplete removal or recurrence. Risks of this procedure are excessive bleeding, bruising, infection, nerve damage, numbness, thick (hypertrophic or keloidal) scar and non-diagnostic biopsy.    How should I care for my wound for the first 24 hours?  Keep the wound dry and covered for 24 hours  If it bleeds, hold direct pressure on the area for 15 minutes. If bleeding does not stop, call us or go to the emergency room  Avoid strenuous exercise the first 1-2 days or as your doctor instructs you    How should I care for the wound after 24 hours?  After 24 hours, remove the bandage  You may bathe or shower as normal  If you had a scalp biopsy, you can shampoo as usual and can use shower water to clean the biopsy site daily  Clean the wound once a day with gentle soap and water  Do not scrub, be gentle  Apply white petroleum/Vaseline after cleaning the wound with a cotton swab or a clean finger, and keep the site covered with a Bandaid /bandage. Bandages are not necessary with a scalp biopsy  If you are unable to cover the site with a Bandaid /bandage, re-apply ointment 2-3 times a day to keep the site moist. Moisture will help with healing  Avoid strenuous activity for first 1-2 days  Avoid lakes, rivers, pools, and oceans until the stitches are removed or the site is healed    How do I clean my wound?  Wash hands thoroughly with soap or use hand  before all wound care  Clean  the wound with gentle soap and water  Apply white petroleum/Vaseline  to wound after it is clean  Replace the Bandaid /bandage to keep the wound covered for the first few days or as instructed by your doctor  If you had a scalp biopsy, warm shower water to the area on a daily basis should suffice    What should I use to clean my wound?   Cotton-tipped applicators (Qtips )  White petroleum jelly (Vaseline ). Use a clean new container and use Q-tips to apply.  Bandaids  as needed  Gentle soap     How should I care for my wound long term?  Do not get your wound dirty  Keep up with wound care for one week or until the area is healed.  A small scab will form and fall off by itself when the area is completely healed. The area will be red and will become pink in color as it heals. Sun protection is very important for how your scar will turn out. Sunscreen with an SPF 30 or greater is recommended once the area is healed.  You should have some soreness but it should be mild and slowly go away over several days. Talk to your doctor about using tylenol for pain,    When should I call my doctor?  If you have increased:   Pain or swelling  Pus or drainage (clear or slightly yellow drainage is ok)  Temperature over 100F  Spreading redness or warmth around wound    When will I hear about my results?  The biopsy results can take 2 weeks to come back.  Your results will automatically release to [employer] before your provider has even reviewed them.  The clinic will call you with the results, send you a [employer] message, or have you schedule a follow-up clinic or phone time to discuss the results.  Contact our clinics if you do not hear from us in 2 weeks.    Who should I call with questions?  [facility]: [phone]  [facility]: [phone]  For urgent needs outside of business hours call the [facility] at [phone] and ask for the dermatology resident on call      Patient Education     Checking for Skin Cancer  You can find cancer early by checking your skin each month. There are 3 kinds of skin cancer. They are melanoma, basal cell carcinoma, and squamous cell carcinoma. Doing monthly skin checks is the best way to find new marks or skin changes. Follow the instructions below for checking your skin.   The ABCDEs of checking moles for melanoma   Check your moles or growths for signs of melanoma using ABCDE:   Asymmetry: the sides of the mole or growth don t match  Border: the edges are ragged, notched, or blurred  Color: the color within the mole or growth varies  Diameter: the mole or growth is larger than 6 mm (size of a pencil eraser)  Evolving: the size, shape, or color of the mole or growth is changing (evolving is not shown in the images below)    Checking for other types of skin cancer  Basal cell carcinoma or squamous cell carcinoma have symptoms such as:     A spot or mole that looks different from all other marks on your skin  Changes in how an area feels, such as itching, tenderness, or pain  Changes in the skin's surface, such as oozing, bleeding, or scaliness  A sore that does not heal  New swelling or redness beyond the border of a mole    Who s at risk?  Anyone can get skin cancer. But you are at greater risk if you have:   Fair skin, light-colored hair, or light-colored eyes  Many moles or abnormal moles on your skin  A history of sunburns from sunlight or tanning beds  A family history of skin cancer  A history of exposure to radiation or chemicals  A weakened immune system  If you have had skin cancer in the past, you are at risk for recurring skin cancer.   How to check your skin  Do your monthly skin checkups in front of a full-length mirror. Check all parts of your body, including your:   Head (ears, face, neck, and scalp)  Torso (front, back, and sides)  Arms (tops, undersides, upper, and lower armpits)  Hands (palms, backs, and fingers, including  under the nails)  Buttocks and genitals  Legs (front, back, and sides)  Feet (tops, soles, toes, including under the nails, and between toes)  If you have a lot of moles, take digital photos of them each month. Make sure to take photos both up close and from a distance. These can help you see if any moles change over time.   Most skin changes are not cancer. But if you see any changes in your skin, call your doctor right away. Only he or she can diagnose a problem. If you have skin cancer, seeing your doctor can be the first step toward getting the treatment that could save your life.   [employer] last reviewed this educational content on 4/1/2019 2000-2020 [employer]. [address] [place] [address], [place] [address]. All rights reserved. This information is not intended as a substitute for professional medical care. Always follow your healthcare professional's instructions.       When should I call my doctor?  If you are worsening or not improving, please, contact us or seek urgent care as noted below.     Who should I call with questions (adults)?  [facility] (adult and pediatric): [phone]  [facility] (adult): [phone]  For urgent needs outside of business hours call the [facility] at [phone] and ask for the dermatology resident on call to be paged  If this is a medical emergency and you are unable to reach an ER, Call [phone]    Who should I call with questions (pediatric)?  [facility]- Pediatric Dermatology  Dr. Juanita Meade, Dr. Carlton Ramos, Dr. Dennise Valle, SAMANTHA Doherty, Dr. Radha Rios, Dr. Elyssa Carney & Dr. Fausto Olivas  Non-urgent nurse triage line; [phone]- Simona and Macey BAUTIST[facility] Care Coordinatormonika Rivas (/Complex ) [phone]    If you need a prescription refill, please contact your pharmacy. Refills are approved or denied by our  Physicians during normal business hours, Monday through Fridays  Per office policy, refills will not be granted if you have not been seen within the past year (or sooner depending on your child's condition)    Scheduling Information:  Pediatric Appointment Scheduling and Call Center [phone]  Radiology Scheduling- [phone]  Sedation Unit Scheduling- [phone]  [place] Scheduling- General [phone]; Pediatric Dermatology [phone]  Main  Services: [phone]  Macedonian: [phone]  Scottish: [phone]  Hmong/Solomon Islander/Armenian: [phone]  Preadmission Nursing Department Fax Number: [phone] (Fax all pre-operative paperwork to this number)    For urgent matters arising during evenings, weekends, or holidays that cannot wait for normal business hours please call [phone] and ask for the dermatology resident on call to be paged.

## 2023-05-10 LAB
PATH REPORT.COMMENTS IMP SPEC: NORMAL
PATH REPORT.COMMENTS IMP SPEC: NORMAL
PATH REPORT.FINAL DX SPEC: NORMAL
PATH REPORT.GROSS SPEC: NORMAL
PATH REPORT.MICROSCOPIC SPEC OTHER STN: NORMAL
PATH REPORT.RELEVANT HX SPEC: NORMAL

## 2023-07-23 ENCOUNTER — HEALTH MAINTENANCE LETTER (OUTPATIENT)
Age: 43
End: 2023-07-23

## 2023-08-08 ASSESSMENT — ENCOUNTER SYMPTOMS
SHORTNESS OF BREATH: 0
NAUSEA: 0
NERVOUS/ANXIOUS: 1
EYE PAIN: 0
PARESTHESIAS: 0
JOINT SWELLING: 0
ARTHRALGIAS: 0
BREAST MASS: 0
DIARRHEA: 0
COUGH: 0
SORE THROAT: 0
CHILLS: 0
HEMATOCHEZIA: 0
MYALGIAS: 0
DIZZINESS: 0
HEMATURIA: 0
ABDOMINAL PAIN: 0
WEAKNESS: 0
FEVER: 0
FREQUENCY: 0
DYSURIA: 0
CONSTIPATION: 0
HEADACHES: 0
HEARTBURN: 0
PALPITATIONS: 0

## 2023-08-14 ASSESSMENT — PATIENT HEALTH QUESTIONNAIRE - PHQ9
SUM OF ALL RESPONSES TO PHQ QUESTIONS 1-9: 12
SUM OF ALL RESPONSES TO PHQ QUESTIONS 1-9: 12
10. IF YOU CHECKED OFF ANY PROBLEMS, HOW DIFFICULT HAVE THESE PROBLEMS MADE IT FOR YOU TO DO YOUR WORK, TAKE CARE OF THINGS AT HOME, OR GET ALONG WITH OTHER PEOPLE: VERY DIFFICULT

## 2023-08-15 ENCOUNTER — OFFICE VISIT (OUTPATIENT)
Dept: FAMILY MEDICINE | Facility: CLINIC | Age: 43
End: 2023-08-15
Payer: COMMERCIAL

## 2023-08-15 VITALS
DIASTOLIC BLOOD PRESSURE: 78 MMHG | OXYGEN SATURATION: 98 % | BODY MASS INDEX: 45 KG/M2 | HEART RATE: 66 BPM | SYSTOLIC BLOOD PRESSURE: 110 MMHG | RESPIRATION RATE: 16 BRPM | HEIGHT: 67 IN | WEIGHT: 286.7 LBS | TEMPERATURE: 98.4 F

## 2023-08-15 DIAGNOSIS — N92.4 EXCESSIVE BLEEDING IN PREMENOPAUSAL PERIOD: ICD-10-CM

## 2023-08-15 DIAGNOSIS — Z12.4 CERVICAL CANCER SCREENING: ICD-10-CM

## 2023-08-15 DIAGNOSIS — F41.9 ANXIETY: ICD-10-CM

## 2023-08-15 DIAGNOSIS — F32.1 CURRENT MODERATE EPISODE OF MAJOR DEPRESSIVE DISORDER WITHOUT PRIOR EPISODE (H): ICD-10-CM

## 2023-08-15 DIAGNOSIS — Z00.00 ROUTINE GENERAL MEDICAL EXAMINATION AT A HEALTH CARE FACILITY: Primary | ICD-10-CM

## 2023-08-15 LAB
ALBUMIN SERPL BCG-MCNC: 4.7 G/DL (ref 3.5–5.2)
ALP SERPL-CCNC: 70 U/L (ref 35–104)
ALT SERPL W P-5'-P-CCNC: 27 U/L (ref 0–50)
ANION GAP SERPL CALCULATED.3IONS-SCNC: 11 MMOL/L (ref 7–15)
AST SERPL W P-5'-P-CCNC: 31 U/L (ref 0–45)
BILIRUB SERPL-MCNC: 0.4 MG/DL
BUN SERPL-MCNC: 16.4 MG/DL (ref 6–20)
CALCIUM SERPL-MCNC: 9.5 MG/DL (ref 8.6–10)
CHLORIDE SERPL-SCNC: 104 MMOL/L (ref 98–107)
CHOLEST SERPL-MCNC: 260 MG/DL
CREAT SERPL-MCNC: 0.78 MG/DL (ref 0.51–0.95)
DEPRECATED HCO3 PLAS-SCNC: 24 MMOL/L (ref 22–29)
ERYTHROCYTE [DISTWIDTH] IN BLOOD BY AUTOMATED COUNT: 13.6 % (ref 10–15)
GFR SERPL CREATININE-BSD FRML MDRD: >90 ML/MIN/1.73M2
GLUCOSE SERPL-MCNC: 92 MG/DL (ref 70–99)
HCT VFR BLD AUTO: 42.3 % (ref 35–47)
HDLC SERPL-MCNC: 40 MG/DL
HGB BLD-MCNC: 14.2 G/DL (ref 11.7–15.7)
LDLC SERPL CALC-MCNC: 173 MG/DL
MCH RBC QN AUTO: 28.5 PG (ref 26.5–33)
MCHC RBC AUTO-ENTMCNC: 33.6 G/DL (ref 31.5–36.5)
MCV RBC AUTO: 85 FL (ref 78–100)
NONHDLC SERPL-MCNC: 220 MG/DL
PLATELET # BLD AUTO: 196 10E3/UL (ref 150–450)
POTASSIUM SERPL-SCNC: 4.4 MMOL/L (ref 3.4–5.3)
PROT SERPL-MCNC: 7.3 G/DL (ref 6.4–8.3)
RBC # BLD AUTO: 4.98 10E6/UL (ref 3.8–5.2)
SODIUM SERPL-SCNC: 139 MMOL/L (ref 136–145)
TRIGL SERPL-MCNC: 233 MG/DL
TSH SERPL DL<=0.005 MIU/L-ACNC: 2.35 UIU/ML (ref 0.3–4.2)
WBC # BLD AUTO: 5.4 10E3/UL (ref 4–11)

## 2023-08-15 PROCEDURE — 80061 LIPID PANEL: CPT | Performed by: FAMILY MEDICINE

## 2023-08-15 PROCEDURE — 90746 HEPB VACCINE 3 DOSE ADULT IM: CPT | Performed by: FAMILY MEDICINE

## 2023-08-15 PROCEDURE — 0124A COVID-19 BIVALENT 12+ (PFIZER): CPT | Performed by: FAMILY MEDICINE

## 2023-08-15 PROCEDURE — 85027 COMPLETE CBC AUTOMATED: CPT | Performed by: FAMILY MEDICINE

## 2023-08-15 PROCEDURE — 91312 COVID-19 BIVALENT 12+ (PFIZER): CPT | Performed by: FAMILY MEDICINE

## 2023-08-15 PROCEDURE — G0145 SCR C/V CYTO,THINLAYER,RESCR: HCPCS | Performed by: FAMILY MEDICINE

## 2023-08-15 PROCEDURE — 87624 HPV HI-RISK TYP POOLED RSLT: CPT | Performed by: FAMILY MEDICINE

## 2023-08-15 PROCEDURE — 90471 IMMUNIZATION ADMIN: CPT | Performed by: FAMILY MEDICINE

## 2023-08-15 PROCEDURE — 96127 BRIEF EMOTIONAL/BEHAV ASSMT: CPT | Performed by: FAMILY MEDICINE

## 2023-08-15 PROCEDURE — 99396 PREV VISIT EST AGE 40-64: CPT | Mod: 25 | Performed by: FAMILY MEDICINE

## 2023-08-15 PROCEDURE — 99214 OFFICE O/P EST MOD 30 MIN: CPT | Mod: 25 | Performed by: FAMILY MEDICINE

## 2023-08-15 PROCEDURE — 80053 COMPREHEN METABOLIC PANEL: CPT | Performed by: FAMILY MEDICINE

## 2023-08-15 PROCEDURE — 84443 ASSAY THYROID STIM HORMONE: CPT | Performed by: FAMILY MEDICINE

## 2023-08-15 PROCEDURE — 36415 COLL VENOUS BLD VENIPUNCTURE: CPT | Performed by: FAMILY MEDICINE

## 2023-08-15 RX ORDER — NORGESTIMATE AND ETHINYL ESTRADIOL 0.25-0.035
1 KIT ORAL DAILY
Qty: 120 TABLET | Refills: 3 | Status: SHIPPED | OUTPATIENT
Start: 2023-08-15 | End: 2024-09-17

## 2023-08-15 RX ORDER — SERTRALINE HYDROCHLORIDE 100 MG/1
50 TABLET, FILM COATED ORAL DAILY
Qty: 90 TABLET | Refills: 3 | Status: SHIPPED | OUTPATIENT
Start: 2023-08-15

## 2023-08-15 ASSESSMENT — ENCOUNTER SYMPTOMS
SHORTNESS OF BREATH: 0
ABDOMINAL PAIN: 0
ARTHRALGIAS: 0
HEMATURIA: 0
PALPITATIONS: 0
EYE PAIN: 0
CONSTIPATION: 0
DIARRHEA: 0
HEMATOCHEZIA: 0
NERVOUS/ANXIOUS: 1
WEAKNESS: 0
FEVER: 0
BREAST MASS: 0
JOINT SWELLING: 0
NAUSEA: 0
DYSURIA: 0
MYALGIAS: 0
SORE THROAT: 0
FREQUENCY: 0
PARESTHESIAS: 0
HEARTBURN: 0
COUGH: 0
HEADACHES: 0
CHILLS: 0
DIZZINESS: 0

## 2023-08-15 ASSESSMENT — ANXIETY QUESTIONNAIRES
GAD7 TOTAL SCORE: 11
1. FEELING NERVOUS, ANXIOUS, OR ON EDGE: MORE THAN HALF THE DAYS
GAD7 TOTAL SCORE: 11
7. FEELING AFRAID AS IF SOMETHING AWFUL MIGHT HAPPEN: SEVERAL DAYS
3. WORRYING TOO MUCH ABOUT DIFFERENT THINGS: SEVERAL DAYS
2. NOT BEING ABLE TO STOP OR CONTROL WORRYING: SEVERAL DAYS
IF YOU CHECKED OFF ANY PROBLEMS ON THIS QUESTIONNAIRE, HOW DIFFICULT HAVE THESE PROBLEMS MADE IT FOR YOU TO DO YOUR WORK, TAKE CARE OF THINGS AT HOME, OR GET ALONG WITH OTHER PEOPLE: VERY DIFFICULT
IF YOU CHECKED OFF ANY PROBLEMS ON THIS QUESTIONNAIRE, HOW DIFFICULT HAVE THESE PROBLEMS MADE IT FOR YOU TO DO YOUR WORK, TAKE CARE OF THINGS AT HOME, OR GET ALONG WITH OTHER PEOPLE: VERY DIFFICULT
3. WORRYING TOO MUCH ABOUT DIFFERENT THINGS: SEVERAL DAYS
5. BEING SO RESTLESS THAT IT IS HARD TO SIT STILL: NEARLY EVERY DAY
6. BECOMING EASILY ANNOYED OR IRRITABLE: SEVERAL DAYS
1. FEELING NERVOUS, ANXIOUS, OR ON EDGE: MORE THAN HALF THE DAYS
GAD7 TOTAL SCORE: 11
5. BEING SO RESTLESS THAT IT IS HARD TO SIT STILL: NEARLY EVERY DAY
7. FEELING AFRAID AS IF SOMETHING AWFUL MIGHT HAPPEN: SEVERAL DAYS
2. NOT BEING ABLE TO STOP OR CONTROL WORRYING: SEVERAL DAYS
6. BECOMING EASILY ANNOYED OR IRRITABLE: SEVERAL DAYS

## 2023-08-15 ASSESSMENT — PAIN SCALES - GENERAL: PAINLEVEL: NO PAIN (0)

## 2023-08-15 ASSESSMENT — PATIENT HEALTH QUESTIONNAIRE - PHQ9
5. POOR APPETITE OR OVEREATING: MORE THAN HALF THE DAYS
5. POOR APPETITE OR OVEREATING: MORE THAN HALF THE DAYS

## 2023-08-15 NOTE — PATIENT INSTRUCTIONS
Preventive Health Recommendations  Female Ages 40 to 49    Yearly exam:   See your health care provider every year in order to  Review health changes.   Discuss preventive care.    Review your medicines if your doctor prescribed any.    Get a Pap test every three years (unless you have an abnormal result and your provider advises testing more often).    If you get Pap tests with HPV test, you only need to test every 5 years, unless you have an abnormal result. You do not need a Pap test if your uterus was removed (hysterectomy) and you have not had cancer.    You should be tested each year for STDs (sexually transmitted diseases), if you're at risk.   Ask your doctor if you should have a mammogram.    Have a colonoscopy (test for colon cancer) if someone in your family has had colon cancer or polyps before age 50.     Have a cholesterol test every 5 years.     Have a diabetes test (fasting glucose) after age 45. If you are at risk for diabetes, you should have this test every 3 years.    Shots: Get a flu shot each year. Get a tetanus shot every 10 years.     Nutrition:   Eat at least 5 servings of fruits and vegetables each day.  Eat whole-grain bread, whole-wheat pasta and brown rice instead of white grains and rice.  Get adequate Calcium and Vitamin D.      Lifestyle  Exercise at least 150 minutes a week (an average of 30 minutes a day, 5 days a week). This will help you control your weight and prevent disease.  Limit alcohol to one drink per day.  No smoking.   Wear sunscreen to prevent skin cancer.  See your dentist every six months for an exam and cleaning.  topamax

## 2023-08-15 NOTE — PROGRESS NOTES
SUBJECTIVE:   CC: Jocelyn is an 43 year old who presents for preventive health visit.       8/15/2023     8:03 AM   Additional Questions   Roomed by Lisa Magill, CMA   Accompanied by self         8/15/2023     8:03 AM   Patient Reported Additional Medications   Patient reports taking the following new medications NONE       Healthy Habits:     Getting at least 3 servings of Calcium per day:  Yes    Bi-annual eye exam:  NO    Dental care twice a year:  Yes    Sleep apnea or symptoms of sleep apnea:  None and Daytime drowsiness    Diet:  Regular (no restrictions)    Frequency of exercise:  2-3 days/week    Duration of exercise:  15-30 minutes    Taking medications regularly:  Yes    Medication side effects:  None    Additional concerns today:  No    Today's PHQ-9 Score:       2023    10:45 AM   PHQ-9 SCORE   PHQ-9 Total Score MyChart 12 (Moderate depression)   PHQ-9 Total Score 12         2022    10:09 AM 3/29/2023     1:43 PM 8/15/2023     8:18 AM   RAISSA-7 SCORE   Total Score 15 (severe anxiety) 12 (moderate anxiety)    Total Score 15    15    15 12 11                        Social History     Tobacco Use    Smoking status: Former    Smokeless tobacco: Never    Tobacco comments:     passive smoker in her 20's   Substance Use Topics    Alcohol use: Yes     Comment: occasionally             2023    12:39 PM   Alcohol Use   Prescreen: >3 drinks/day or >7 drinks/week? No          No data to display              Reviewed orders with patient.  Reviewed health maintenance and updated orders accordingly - Yes  Labs reviewed in EPIC  BP Readings from Last 3 Encounters:   08/15/23 110/78   23 110/84   22 136/88    Wt Readings from Last 3 Encounters:   08/15/23 130 kg (286 lb 11.2 oz)   23 127 kg (280 lb)   22 122.5 kg (270 lb)                    Breast Cancer Screenin/18/2022     6:46 AM   Breast CA Risk Assessment (FHS-7)   Do you have a family history of breast, colon, or  "ovarian cancer? No / Unknown         Mammogram Screening - Offered annual screening and updated Health Maintenance for [place] plan based on risk factor consideration  Pertinent mammograms are reviewed under the imaging tab.    History of abnormal Pap smear: NO - age 30-65 PAP every 5 years with negative HPV co-testing recommended      Latest Ref Rng & Units 9/11/2018     9:10 AM 9/11/2018     8:42 AM 4/9/2014    12:00 AM   PAP / HPV   PAP (Historical)   NIL  NIL    HPV 16 DNA NEG^Negative Negative      HPV 18 DNA NEG^Negative Negative      Other HR HPV NEG^Negative Negative        Reviewed and updated as needed this visit by clinical staff   Tobacco  Allergies  Meds  Problems  Med Hx  Surg Hx  Fam Hx          Reviewed and updated as needed this visit by Provider                     Review of Systems   Constitutional:  Negative for chills and fever.   HENT:  Negative for congestion, ear pain, hearing loss and sore throat.    Eyes:  Negative for pain and visual disturbance.   Respiratory:  Negative for cough and shortness of breath.    Cardiovascular:  Negative for chest pain, palpitations and peripheral edema.   Gastrointestinal:  Negative for abdominal pain, constipation, diarrhea, heartburn, hematochezia and nausea.   Breasts:  Negative for tenderness, breast mass and discharge.   Genitourinary:  Negative for dysuria, frequency, genital sores, hematuria, pelvic pain, urgency, vaginal bleeding and vaginal discharge.   Musculoskeletal:  Negative for arthralgias, joint swelling and myalgias.   Skin:  Negative for rash.   Neurological:  Negative for dizziness, weakness, headaches and paresthesias.   Psychiatric/Behavioral:  Positive for mood changes. The patient is nervous/anxious.           OBJECTIVE:   /78 (BP Location: Right arm, Patient Position: Sitting, Cuff Size: Adult Large)   Pulse 66   Temp 98.4  F (36.9  C) (Oral)   Resp 16   Ht 1.689 m (5' 6.5\")   Wt 130 kg (286 lb 11.2 oz)   LMP " 08/05/2023   SpO2 98%   BMI 45.58 kg/m    Physical Exam  GENERAL: healthy, alert and no distress  EYES: Eyes grossly normal to inspection, PERRL and conjunctivae and sclerae normal  HENT: ear canals and TM's normal, nose and mouth without ulcers or lesions  NECK: no adenopathy, no asymmetry, masses, or scars and thyroid normal to palpation  RESP: lungs clear to auscultation - no rales, rhonchi or wheezes  BREAST: normal without masses, tenderness or nipple discharge and no palpable axillary masses or adenopathy  CV: regular rate and rhythm, normal S1 S2, no S3 or S4, no murmur, click or rub, no peripheral edema and peripheral pulses strong  ABDOMEN: soft, nontender, no hepatosplenomegaly, no masses and bowel sounds normal   (female): normal female external genitalia, normal urethral meatus, vaginal mucosa pink, moist, well rugated, and normal cervix/adnexa/uterus without masses or discharge  MS: no gross musculoskeletal defects noted, no edema  SKIN: no suspicious lesions or rashes  NEURO: Normal strength and tone, mentation intact and speech normal  PSYCH: mentation appears normal, affect normal/bright    Diagnostic Test Results:  Labs reviewed in Epic    ASSESSMENT/PLAN:   (Z00.00) Routine general medical examination at a health care facility  (primary encounter diagnosis)  Comment: normal exam  Plan: TSH with free T4 reflex, CBC with platelets,         Comprehensive metabolic panel (BMP + Alb, Alk         Phos, ALT, AST, Total. Bili, TP), Lipid panel         reflex to direct LDL Fasting        Due for labs    (Z12.4) Cervical cancer screening  Comment: pap done  Plan: Pap Screen with HPV - recommended age 30 - 65         years, HPV Hold (Lab Only), HPV High Risk Types        DNA Cervical            (F41.9) Anxiety  Comment: controlled, checking [id number]: sertraline (ZOLOFT) 100 MG tablet, TSH with         free T4 reflex              (F32.1) Current moderate episode of major depressive disorder without prior  "episode (H)  Comment: controlled, refiled med  Plan: sertraline (ZOLOFT) 100 MG tablet, TSH with         free T4 reflex            (N92.4) Excessive bleeding in premenopausal period  Comment: start OCPs , recheck in 3 months  Plan: norgestimate-ethinyl estradiol (ORTHO-CYCLEN)         0.25-35 MG-MCG tablet, TSH with free T4 reflex            Patient has been advised of split billing requirements and indicates understanding: Yes    Depression Screening Follow Up        8/14/2023    10:45 AM   PHQ   PHQ-9 Total Score 12   Q9: Thoughts of better off dead/self-harm past 2 weeks Not at all         Follow Up Actions Taken  Crisis resource information provided in After Visit Summary       COUNSELING:  Reviewed preventive health counseling, as reflected in patient instructions       Regular exercise       Healthy diet/nutrition      BMI:   Estimated body mass index is 45.58 kg/m  as calculated from the following:    Height as of this encounter: 1.689 m (5' 6.5\").    Weight as of this encounter: 130 kg (286 lb 11.2 oz).   Weight management plan: Discussed healthy diet and exercise guidelines      She reports that she has quit smoking. She has never used smokeless tobacco.          Jennifer Nicholson MD  [facility]  "

## 2023-08-15 NOTE — NURSING NOTE
"Chief Complaint   Patient presents with    Physical     With Pap    Blood Draw     Fasting Labs.      Initial /78 (BP Location: Right arm, Patient Position: Sitting, Cuff Size: Adult Large)   Pulse 66   Temp 98.4  F (36.9  C) (Oral)   Resp 16   Ht 1.689 m (5' 6.5\")   Wt 130 kg (286 lb 11.2 oz)   LMP 08/05/2023   SpO2 98%   BMI 45.58 kg/m   Estimated body mass index is 45.58 kg/m  as calculated from the following:    Height as of this encounter: 1.689 m (5' 6.5\").    Weight as of this encounter: 130 kg (286 lb 11.2 oz).  BP completed using cuff size large right arm    Lisa Magill, CMA    "

## 2023-08-17 LAB
BKR LAB AP GYN ADEQUACY: NORMAL
BKR LAB AP GYN INTERPRETATION: NORMAL
BKR LAB AP HPV REFLEX: NORMAL
BKR LAB AP LMP: NORMAL
BKR LAB AP PREVIOUS ABNORMAL: NORMAL
PATH REPORT.COMMENTS IMP SPEC: NORMAL
PATH REPORT.COMMENTS IMP SPEC: NORMAL
PATH REPORT.RELEVANT HX SPEC: NORMAL

## 2023-08-21 LAB
HUMAN PAPILLOMA VIRUS 16 DNA: NEGATIVE
HUMAN PAPILLOMA VIRUS 18 DNA: NEGATIVE
HUMAN PAPILLOMA VIRUS FINAL DIAGNOSIS: NORMAL
HUMAN PAPILLOMA VIRUS OTHER HR: NEGATIVE

## 2023-09-04 ENCOUNTER — MYC MEDICAL ADVICE (OUTPATIENT)
Dept: FAMILY MEDICINE | Facility: CLINIC | Age: 43
End: 2023-09-04
Payer: COMMERCIAL

## 2023-09-18 ENCOUNTER — IMMUNIZATION (OUTPATIENT)
Dept: FAMILY MEDICINE | Facility: CLINIC | Age: 43
End: 2023-09-18
Payer: COMMERCIAL

## 2023-09-18 DIAGNOSIS — Z23 NEED FOR PROPHYLACTIC VACCINATION AND INOCULATION AGAINST INFLUENZA: Primary | ICD-10-CM

## 2023-09-18 PROCEDURE — 90686 IIV4 VACC NO PRSV 0.5 ML IM: CPT

## 2023-09-18 PROCEDURE — 99207 PR NO CHARGE NURSE ONLY: CPT

## 2023-09-18 PROCEDURE — 90471 IMMUNIZATION ADMIN: CPT

## 2023-11-15 ENCOUNTER — VIRTUAL VISIT (OUTPATIENT)
Dept: FAMILY MEDICINE | Facility: CLINIC | Age: 43
End: 2023-11-15
Attending: FAMILY MEDICINE
Payer: COMMERCIAL

## 2023-11-15 DIAGNOSIS — E78.5 HYPERLIPIDEMIA LDL GOAL <130: ICD-10-CM

## 2023-11-15 DIAGNOSIS — N92.0 SPOTTING BETWEEN MENSES: ICD-10-CM

## 2023-11-15 DIAGNOSIS — F41.1 GAD (GENERALIZED ANXIETY DISORDER): ICD-10-CM

## 2023-11-15 DIAGNOSIS — F33.0 MILD EPISODE OF RECURRENT MAJOR DEPRESSIVE DISORDER (H): Primary | ICD-10-CM

## 2023-11-15 DIAGNOSIS — E66.01 MORBID OBESITY (H): ICD-10-CM

## 2023-11-15 PROCEDURE — 99214 OFFICE O/P EST MOD 30 MIN: CPT | Mod: VID | Performed by: FAMILY MEDICINE

## 2023-11-15 ASSESSMENT — ANXIETY QUESTIONNAIRES
1. FEELING NERVOUS, ANXIOUS, OR ON EDGE: MORE THAN HALF THE DAYS
GAD7 TOTAL SCORE: 8
7. FEELING AFRAID AS IF SOMETHING AWFUL MIGHT HAPPEN: SEVERAL DAYS
GAD7 TOTAL SCORE: 8
2. NOT BEING ABLE TO STOP OR CONTROL WORRYING: SEVERAL DAYS
5. BEING SO RESTLESS THAT IT IS HARD TO SIT STILL: SEVERAL DAYS
6. BECOMING EASILY ANNOYED OR IRRITABLE: SEVERAL DAYS
3. WORRYING TOO MUCH ABOUT DIFFERENT THINGS: SEVERAL DAYS
IF YOU CHECKED OFF ANY PROBLEMS ON THIS QUESTIONNAIRE, HOW DIFFICULT HAVE THESE PROBLEMS MADE IT FOR YOU TO DO YOUR WORK, TAKE CARE OF THINGS AT HOME, OR GET ALONG WITH OTHER PEOPLE: SOMEWHAT DIFFICULT

## 2023-11-15 ASSESSMENT — PATIENT HEALTH QUESTIONNAIRE - PHQ9
5. POOR APPETITE OR OVEREATING: SEVERAL DAYS
SUM OF ALL RESPONSES TO PHQ QUESTIONS 1-9: 3

## 2023-11-15 NOTE — PROGRESS NOTES
Jocelyn is a 43 year old who is being evaluated via a billable video visit.      How would you like to obtain your AVS? MyChart  If the video visit is dropped, the invitation should be resent by: Text to cell phone: [phone]  Will anyone else be joining your video visit? No          Assessment & Plan     Mild episode of recurrent major depressive disorder (H24)  Improving, cont same    Morbid obesity (H)  Discussed options, will trail this Potential medication side effects were discussed with the patient; let me know if any occur.   - naltrexone-bupropion (CONTRAVE) 8-90 MG per 12 hr tablet; Take 1 tablet by mouth 2 times daily  If not covered, will order individual pills seperately    Hyperlipidemia LDL goal <130  Diet discussed, will recheck yearly    RAISSA (generalized anxiety disorder)  Still not controlled but situational and doing ok, does want to cont zoloft 50mg    Spotting between menses  Ok to stop pills for 1 week to have period and then resume continuous pills  Discussed optional IUD in future if pills fail to regulate    Ordering of each unique test  Prescription drug management  32 minutes spent by me on the date of the encounter doing chart review, history and exam, documentation and further activities per the note       Work on weight loss  Regular exercise    Jennifer Nicholson MD  [facility]    Alistair Banks is a 43 year old, presenting for the following health issues:  Depression        11/15/2023     8:51 AM   Additional Questions   Roomed by Elham Yarbrough of Present Illness       Mental Health Follow-up:  Patient presents to follow-up on Depression.Patient's depression since last visit has been:  Better  The patient is not having other symptoms associated with depression.      Any significant life events: No  Patient is not feeling anxious or having panic attacks.  Patient has no concerns about alcohol or drug use.    Reason for visit:  Follow up to  new medications    She eats 2-3 servings of fruits and vegetables daily.She consumes 3 sweetened beverage(s) daily.She exercises with enough effort to increase her heart rate 10 to 19 minutes per day.  She exercises with enough effort to increase her heart rate 3 or less days per week.   She is taking medications regularly.         3/29/2023     1:43 PM 8/14/2023    10:45 AM 11/15/2023     8:54 AM   PHQ   PHQ-9 Total Score 12 12 3   Q9: Thoughts of better off dead/self-harm past 2 weeks Not at all Not at all Not at all         8/15/2023     8:18 AM 8/15/2023     9:24 AM 11/15/2023     8:55 AM   RAISSA-7 SCORE   Total Score 11 11 8     Taking zoloft 50mg and happy with it.    Her kitchen has a leak and it is torn apart and it is expensive and unexpected.    Severe obesity, was considering wegovy,  is on ozempic and is working well.  Trying to eat better and exercising and not working, tried phentermine and did not like it, topamax and was with phentermine and did not work.  In 2020 sent home and working from home since and the weight has gone up.      Medication Followup of birth control  Taking Medication as prescribed: NO  Side Effects:  none  Medication Helping Symptoms:  yes helped with mood but not helping with bleeding  Spotting every day this month and not predictable.   Taking the pill every night, never missed a pill and is taking them continuous.  At first she was getting a light period once per month, and that stopped but now is is spotting this month.  Prior to this periods were very heavy and with clots.  Pms sx are gone and not as jeronimo and skin not breaking out, so the pills are nice.         Review of Systems   CONSTITUTIONAL: NEGATIVE for fever, chills, change in weight  ENT/MOUTH: NEGATIVE for ear, mouth and throat problems  RESP: NEGATIVE for significant cough or SOB  CV: NEGATIVE for chest pain, palpitations or peripheral edema      Objective    Vitals - Patient Reported  Weight (Patient  "Reported): 129.7 kg (286 lb)  Height (Patient Reported): 170.2 cm (5' 7\")  BMI (Based on Pt Reported Ht/Wt): 44.79      Vitals:  No vitals were obtained today due to virtual visit.    Physical Exam   GENERAL: Healthy, alert and no distress  EYES: Eyes grossly normal to inspection.  No discharge or erythema, or obvious scleral/conjunctival abnormalities.  RESP: No audible wheeze, cough, or visible cyanosis.  No visible retractions or increased work of breathing.    SKIN: Visible skin clear. No significant rash, abnormal pigmentation or lesions.  NEURO: Cranial nerves grossly intact.  Mentation and speech appropriate for age.  PSYCH: Mentation appears normal, affect normal/bright, judgement and insight intact, normal speech and appearance well-groomed.    Revewied lipids, glucose and weights            Video-Visit Details    Type of service:  Video Visit     Originating Location (pt. Location): Home    Distant Location (provider location):  On-site  Platform used for Video Visit: Rogers"

## 2024-02-12 ASSESSMENT — ANXIETY QUESTIONNAIRES
GAD7 TOTAL SCORE: 10
1. FEELING NERVOUS, ANXIOUS, OR ON EDGE: SEVERAL DAYS
3. WORRYING TOO MUCH ABOUT DIFFERENT THINGS: MORE THAN HALF THE DAYS
IF YOU CHECKED OFF ANY PROBLEMS ON THIS QUESTIONNAIRE, HOW DIFFICULT HAVE THESE PROBLEMS MADE IT FOR YOU TO DO YOUR WORK, TAKE CARE OF THINGS AT HOME, OR GET ALONG WITH OTHER PEOPLE: SOMEWHAT DIFFICULT
GAD7 TOTAL SCORE: 10
2. NOT BEING ABLE TO STOP OR CONTROL WORRYING: SEVERAL DAYS
6. BECOMING EASILY ANNOYED OR IRRITABLE: SEVERAL DAYS
7. FEELING AFRAID AS IF SOMETHING AWFUL MIGHT HAPPEN: SEVERAL DAYS
8. IF YOU CHECKED OFF ANY PROBLEMS, HOW DIFFICULT HAVE THESE MADE IT FOR YOU TO DO YOUR WORK, TAKE CARE OF THINGS AT HOME, OR GET ALONG WITH OTHER PEOPLE?: SOMEWHAT DIFFICULT
7. FEELING AFRAID AS IF SOMETHING AWFUL MIGHT HAPPEN: SEVERAL DAYS
5. BEING SO RESTLESS THAT IT IS HARD TO SIT STILL: MORE THAN HALF THE DAYS
4. TROUBLE RELAXING: MORE THAN HALF THE DAYS

## 2024-02-14 ASSESSMENT — PATIENT HEALTH QUESTIONNAIRE - PHQ9
SUM OF ALL RESPONSES TO PHQ QUESTIONS 1-9: 7
10. IF YOU CHECKED OFF ANY PROBLEMS, HOW DIFFICULT HAVE THESE PROBLEMS MADE IT FOR YOU TO DO YOUR WORK, TAKE CARE OF THINGS AT HOME, OR GET ALONG WITH OTHER PEOPLE: SOMEWHAT DIFFICULT
SUM OF ALL RESPONSES TO PHQ QUESTIONS 1-9: 7

## 2024-02-15 ENCOUNTER — VIRTUAL VISIT (OUTPATIENT)
Dept: FAMILY MEDICINE | Facility: CLINIC | Age: 44
End: 2024-02-15
Attending: FAMILY MEDICINE
Payer: COMMERCIAL

## 2024-02-15 ENCOUNTER — LAB (OUTPATIENT)
Dept: LAB | Facility: CLINIC | Age: 44
End: 2024-02-15
Payer: COMMERCIAL

## 2024-02-15 DIAGNOSIS — E66.01 MORBID OBESITY (H): ICD-10-CM

## 2024-02-15 DIAGNOSIS — E78.5 HYPERLIPIDEMIA LDL GOAL <130: ICD-10-CM

## 2024-02-15 DIAGNOSIS — Z12.31 VISIT FOR SCREENING MAMMOGRAM: ICD-10-CM

## 2024-02-15 DIAGNOSIS — F33.2 MODERATELY SEVERE RECURRENT MAJOR DEPRESSION (H): Primary | ICD-10-CM

## 2024-02-15 LAB
ALBUMIN SERPL BCG-MCNC: 4.5 G/DL (ref 3.5–5.2)
ALP SERPL-CCNC: 71 U/L (ref 40–150)
ALT SERPL W P-5'-P-CCNC: 21 U/L (ref 0–50)
ANION GAP SERPL CALCULATED.3IONS-SCNC: 10 MMOL/L (ref 7–15)
AST SERPL W P-5'-P-CCNC: 26 U/L (ref 0–45)
BILIRUB SERPL-MCNC: 0.3 MG/DL
BUN SERPL-MCNC: 12.1 MG/DL (ref 6–20)
CALCIUM SERPL-MCNC: 9.9 MG/DL (ref 8.6–10)
CHLORIDE SERPL-SCNC: 101 MMOL/L (ref 98–107)
CHOLEST SERPL-MCNC: 273 MG/DL
CREAT SERPL-MCNC: 0.86 MG/DL (ref 0.51–0.95)
DEPRECATED HCO3 PLAS-SCNC: 29 MMOL/L (ref 22–29)
EGFRCR SERPLBLD CKD-EPI 2021: 87 ML/MIN/1.73M2
FASTING STATUS PATIENT QL REPORTED: YES
GLUCOSE SERPL-MCNC: 88 MG/DL (ref 70–99)
HDLC SERPL-MCNC: 57 MG/DL
LDLC SERPL CALC-MCNC: 138 MG/DL
NONHDLC SERPL-MCNC: 216 MG/DL
POTASSIUM SERPL-SCNC: 4.3 MMOL/L (ref 3.4–5.3)
PROT SERPL-MCNC: 7.5 G/DL (ref 6.4–8.3)
SODIUM SERPL-SCNC: 140 MMOL/L (ref 135–145)
TRIGL SERPL-MCNC: 391 MG/DL

## 2024-02-15 PROCEDURE — 99214 OFFICE O/P EST MOD 30 MIN: CPT | Mod: 95 | Performed by: FAMILY MEDICINE

## 2024-02-15 PROCEDURE — 80053 COMPREHEN METABOLIC PANEL: CPT

## 2024-02-15 PROCEDURE — 80061 LIPID PANEL: CPT

## 2024-02-15 PROCEDURE — 36415 COLL VENOUS BLD VENIPUNCTURE: CPT

## 2024-02-15 NOTE — PROGRESS NOTES
"Jocelyn is a 43 year old who is being evaluated via a billable video visit.      How would you like to obtain your AVS? MyChart  If the video visit is dropped, the invitation should be resent by: Text to cell phone: [phone]  Will anyone else be joining your video visit? No          Assessment & Plan     Moderately severe recurrent major depression (H)  Doing well! Cont same, rechecking every 6 months    Morbid obesity (H)  Raise dose, doing well cont exercise and healthy diet  - naltrexone-bupropion (CONTRAVE) 8-90 MG per 12 hr tablet; Take 2 tablets by mouth 2 times daily  - Comprehensive metabolic panel (BMP + Alb, Alk Phos, ALT, AST, Total. Bili, TP); Future  BMI is still above 40 , Follow up every 6 months    Visit for screening mammogram  Agrees   - MA SCREENING DIGITAL BILAT - Future  (s+30); Future    Hyperlipidemia LDL goal <130  Agrees to come in today, is fasting  - Lipid panel reflex to direct LDL Fasting; Future    Ordering of each unique test  Prescription drug management  25 minutes spent by me on the date of the encounter doing chart review, history and exam, documentation and further activities per the note      BMI  Estimated body mass index is 45.58 kg/m  as calculated from the following:    Height as of 8/15/23: 1.689 m (5' 6.5\").    Weight as of 8/15/23: 130 kg (286 lb 11.2 oz).   Weight management plan: Discussed healthy diet and exercise guidelines      Work on weight loss  Regular exercise    Alistair Banks is a 43 year old, presenting for the following health issues:  Recheck Medication (Contrave), Anxiety, Diabetes, and Lipids    History of Present Illness       Mental Health Follow-up:  Patient presents to follow-up on Depression & Anxiety.Patient's depression since last visit has been:  Better  The patient is not having other symptoms associated with depression.  Patient's anxiety since last visit has been:  No change  The patient is not having other symptoms associated with " anxiety.  Any significant life events: health concerns  Patient is feeling anxious or having panic attacks.  Patient has no concerns about alcohol or drug use.    Hyperlipidemia:  She presents for follow up of hyperlipidemia.   She is not taking medication to lower cholesterol. She is not having myalgia or other side effects to statin medications.    Reason for visit:  Follow up from last appt/new medication    She eats 2-3 servings of fruits and vegetables daily.She consumes 1 sweetened beverage(s) daily.She exercises with enough effort to increase her heart rate 10 to 19 minutes per day.  She exercises with enough effort to increase her heart rate 4 days per week.   She is taking medications regularly.       Hyperlipidemia Follow-Up    Are you regularly taking any medication or supplement to lower your cholesterol?   No  Are you having muscle aches or other side effects that you think could be caused by your cholesterol lowering medication?  No  Made some diet changes prior to holidays and then slipped a bit    Depression and Anxiety Follow-Up  How are you doing with your depression since your last visit? Improved maybe due to contrave?  How are you doing with your anxiety since your last visit?  No change  Are you having other symptoms that might be associated with depression or anxiety? No  Have you had a significant life event? Health Concerns   Do you have any concerns with your use of alcohol or other drugs? No    Social History     Tobacco Use    Smoking status: Former    Smokeless tobacco: Never    Tobacco comments:     passive smoker in her 20's   Vaping Use    Vaping Use: Never used   Substance Use Topics    Alcohol use: Yes     Comment: occasionally    Drug use: No         8/14/2023    10:45 AM 11/15/2023     8:54 AM 2/14/2024    11:05 AM   PHQ   PHQ-9 Total Score 12 3 7   Q9: Thoughts of better off dead/self-harm past 2 weeks Not at all Not at all Not at all         8/15/2023     9:24 AM 11/15/2023      8:55 AM 2/12/2024    10:28 AM   RAISSA-7 SCORE   Total Score   10 (moderate anxiety)   Total Score 11 8 10         Suicide Assessment Five-step Evaluation and Treatment (SAFE-T)      Medication Followup of Contrave  Taking Medication as prescribed: yes  Side Effects:  None  Medication Helping Symptoms:  yes  No longer thinking about food all the time, not thining about it all the time, super helpful, was helping and gained some back from the holidays, down 8# now and walking and strength training, and no longer snacking as much.  Wegovy was considered but there was a shortage  She is asking about a higher dose    Vit D3 5000 per day for 6 months, will go back to 1000 daily and skip the lab test    Not up to date with covid and needs 2nd hep B          Review of Systems  Constitutional, HEENT, cardiovascular, pulmonary, gi and gu systems are negative, except as otherwise noted.      Objective    Vitals - Patient Reported  SpO2 (Patient Reported): 96  Temperature (Patient Reported): 97.9  F (36.6  C)  Pulse (Patient Reported): 108  Pain Score: No Pain (0)      Vitals:  No vitals were obtained today due to virtual visit.    Physical Exam   GENERAL: alert and no distress  EYES: Eyes grossly normal to inspection.  No discharge or erythema, or obvious scleral/conjunctival abnormalities.  RESP: No audible wheeze, cough, or visible cyanosis.    SKIN: Visible skin clear. No significant rash, abnormal pigmentation or lesions.  NEURO: Cranial nerves grossly intact.  Mentation and speech appropriate for age.  PSYCH: Appropriate affect, tone, and pace of words    Reviewed labs   Due for lipid recheck      Video-Visit Details    Type of service:  Video Visit     Originating Location (pt. Location): Home    Distant Location (provider location):  On-site  Platform used for Video Visit: Rogers  Signed Electronically by: Jennifer Nicholson MD

## 2024-02-18 ENCOUNTER — HEALTH MAINTENANCE LETTER (OUTPATIENT)
Age: 44
End: 2024-02-18

## 2024-04-05 ENCOUNTER — TELEPHONE (OUTPATIENT)
Dept: FAMILY MEDICINE | Facility: CLINIC | Age: 44
End: 2024-04-05
Payer: COMMERCIAL

## 2024-04-05 DIAGNOSIS — E66.01 MORBID OBESITY (H): ICD-10-CM

## 2024-04-05 NOTE — TELEPHONE ENCOUNTER
Prior Authorization Retail Medication Request    Medication/Dose: Contrave   Diagnosis and ICD code (if different than what is on RX):    New/renewal/insurance change PA/secondary ins. PA:  Previously Tried and Failed:    Rationale:      Insurance   Primary: [employer]  Insurance ID:  [id number]      Pharmacy Information (if different than what is on RX)    Pili Khan CPhT  [facility]  [address] [place] [address] [phone]

## 2024-04-08 NOTE — TELEPHONE ENCOUNTER
Retail Pharmacy Prior Authorization Team   Phone: [phone]    PA Initiation    Medication: CONTRAVE 8-90 MG PO TB12  Insurance Company: [employer] ([facility]) - Phone [phone] Fax [phone]  Pharmacy Filling the Rx: [place], [place] - [address]  Filling Pharmacy Phone: [phone]  Filling Pharmacy Fax:    Start Date: 4/8/2024

## 2024-04-08 NOTE — TELEPHONE ENCOUNTER
Prior Authorization Approval    Authorization Effective Date: 4/8/2024  Authorization Expiration Date: 4/8/2025  Medication: naltrexone-bupropion (CONTRAVE) 8-90 MG per 12 hr tablet-APPROVED  Reference #:     Insurance Company: [employer] ([facility]) - Phone [phone] Fax [phone]  Which Pharmacy is filling the prescription (Not needed for infusion/clinic administered): [facility] [place], [place] - [address] [facility]  Pharmacy Notified: Yes  Patient Notified: Instructed pharmacy to notify patient when script is ready to /ship.

## 2024-04-27 ENCOUNTER — HOSPITAL ENCOUNTER (EMERGENCY)
Facility: CLINIC | Age: 44
Discharge: HOME | End: 2024-04-27
Attending: SOCIAL WORKER | Admitting: SOCIAL WORKER
Payer: COMMERCIAL

## 2024-04-27 ENCOUNTER — APPOINTMENT (OUTPATIENT)
Dept: CT IMAGING | Facility: CLINIC | Age: 44
End: 2024-04-27
Attending: SOCIAL WORKER
Payer: COMMERCIAL

## 2024-04-27 VITALS
SYSTOLIC BLOOD PRESSURE: 140 MMHG | BODY MASS INDEX: 44.73 KG/M2 | TEMPERATURE: 97.6 F | DIASTOLIC BLOOD PRESSURE: 85 MMHG | HEART RATE: 54 BPM | RESPIRATION RATE: 18 BRPM | WEIGHT: 285 LBS | HEIGHT: 67 IN | OXYGEN SATURATION: 97 %

## 2024-04-27 DIAGNOSIS — M54.50 ACUTE MIDLINE LOW BACK PAIN WITHOUT SCIATICA: ICD-10-CM

## 2024-04-27 PROCEDURE — 250N000013 HC RX MED GY IP 250 OP 250 PS 637: Performed by: SOCIAL WORKER

## 2024-04-27 PROCEDURE — 99284 EMERGENCY DEPT VISIT MOD MDM: CPT | Mod: 25

## 2024-04-27 PROCEDURE — 72131 CT LUMBAR SPINE W/O DYE: CPT

## 2024-04-27 RX ORDER — METHOCARBAMOL 500 MG/1
500 TABLET, FILM COATED ORAL ONCE
Status: COMPLETED | OUTPATIENT
Start: 2024-04-27 | End: 2024-04-27

## 2024-04-27 RX ORDER — LIDOCAINE 4 G/G
1 PATCH TOPICAL ONCE
Status: DISCONTINUED | OUTPATIENT
Start: 2024-04-27 | End: 2024-04-27 | Stop reason: HOSPADM

## 2024-04-27 RX ORDER — METHOCARBAMOL 500 MG/1
500 TABLET, FILM COATED ORAL 4 TIMES DAILY PRN
Qty: 28 TABLET | Refills: 0 | Status: SHIPPED | OUTPATIENT
Start: 2024-04-27 | End: 2024-05-04

## 2024-04-27 RX ORDER — IBUPROFEN 600 MG/1
600 TABLET, FILM COATED ORAL ONCE
Status: COMPLETED | OUTPATIENT
Start: 2024-04-27 | End: 2024-04-27

## 2024-04-27 RX ORDER — ACETAMINOPHEN 500 MG
1000 TABLET ORAL ONCE
Status: COMPLETED | OUTPATIENT
Start: 2024-04-27 | End: 2024-04-27

## 2024-04-27 RX ORDER — LIDOCAINE 4 G/G
1 PATCH TOPICAL EVERY 24 HOURS
Qty: 5 PATCH | Refills: 0 | Status: SHIPPED | OUTPATIENT
Start: 2024-04-27 | End: 2024-05-02

## 2024-04-27 RX ADMIN — ACETAMINOPHEN 1000 MG: 500 TABLET, FILM COATED ORAL at 18:02

## 2024-04-27 RX ADMIN — IBUPROFEN 600 MG: 600 TABLET, FILM COATED ORAL at 18:02

## 2024-04-27 RX ADMIN — LIDOCAINE 1 PATCH: 4 PATCH TOPICAL at 19:48

## 2024-04-27 RX ADMIN — METHOCARBAMOL 500 MG: 500 TABLET ORAL at 19:48

## 2024-04-27 ASSESSMENT — COLUMBIA-SUICIDE SEVERITY RATING SCALE - C-SSRS
2. HAVE YOU ACTUALLY HAD ANY THOUGHTS OF KILLING YOURSELF IN THE PAST MONTH?: NO
1. IN THE PAST MONTH, HAVE YOU WISHED YOU WERE DEAD OR WISHED YOU COULD GO TO SLEEP AND NOT WAKE UP?: NO
6. HAVE YOU EVER DONE ANYTHING, STARTED TO DO ANYTHING, OR PREPARED TO DO ANYTHING TO END YOUR LIFE?: NO

## 2024-04-27 ASSESSMENT — ACTIVITIES OF DAILY LIVING (ADL)
ADLS_ACUITY_SCORE: 35

## 2024-04-27 NOTE — ED TRIAGE NOTES
Arrives from home via EMS was working in the garden and heard a pop in her lower back and then napped. Woke up and then has lower back pain with movement, 10/10.     Denies trauma to the area. States was lifting a brick when she heard the noise. Denies numbness.     VSS en route. A&OX4.     HX of hernia disc approx 20 years ago, L4-L5. States it feels similar in pain and location.      100mcg of fenta en route.

## 2024-04-27 NOTE — ED PROVIDER NOTES
"  History     Chief Complaint:  Back Pain       HPI   Jocelyn Weber is a 43 year old female with a history of herniated disc who presents with back pain. The patient was working in her garden today when she heard a pop while lifting up a brick. She states the pain feels similar to her herniated disc in L4-L5 from many years ago. She was able to walk inside and lay down to take a nap but when when she woke up, she was not able to get up off the floor. She endorses 10/10 pain with movement but 0/10 when stationary. Pain does not radiate down her legs. Was given 100 mcg of Fentanyl en route. No trauma. She denies any numbness, tingling, abdominal pain, chest pain, leg pain.     Independent Historian:    None - Patient Only    Review of External Notes:  None     Allergies:  Blood Transfusion Related (Informational Only)  Nkda [No Known Drug Allergy]     Relevant Medical History:  Hyperlipidemia   Obesity   Depression     Physical Exam   Patient Vitals for the past 24 hrs:   BP Temp Temp src Pulse Resp SpO2 Height Weight   04/27/24 1726 127/81 97.6  F (36.4  C) Oral 85 18 97 % 1.702 m (5' 7\") 129.3 kg (285 lb)        Physical Exam    General: Overall stable and nontoxic appearing  HEENT: Conjunctivae clear, no scleral icterus, mucous membranes moist  Neuro: Alert, conversant; no facial droop, no slurred speech  Plantar flexion, great toe dorsiflexion intact and equal bilaterally Knee flexion/extension intact and equal Sensation over the dorsal 1st webspace, plantar great toe, lateral foot intact and equal bilaterally  Able to raise both legs off the bed   CV: Regular rate, regular rhythm, radial and DP pulses equal  Respiratory: No signs of respiratory distress, lungs clear to auscultation bilaterally   Abdomen: Soft, without rigidity or rebound throughout  MSK: No lower extremity swelling or tenderness. TTP over L3/L4 at midline.     Emergency Department Course     Laboratory: Imaging:   Labs Ordered and Resulted " from Time of ED Arrival to Time of ED Departure - No data to display  CT Lumbar Spine w/o Contrast   Final Result   IMPRESSION:   1.  No fracture or posttraumatic subluxation.                 Emergency Department Course & Assessments:       Interventions:  Medications   Lidocaine (LIDOCARE) 4 % Patch 1 patch (has no administration in time range)   methocarbamol (ROBAXIN) tablet 500 mg (has no administration in time range)   acetaminophen (TYLENOL) tablet 1,000 mg (1,000 mg Oral $Given 4/27/24 1802)   ibuprofen (ADVIL/MOTRIN) tablet 600 mg (600 mg Oral $Given 4/27/24 1802)        Assessments, Independent Interpretation, Consult/Discussion of ManagementTests:  ED Course as of 04/27/24 1935   Sat Apr 27, 2024   1735 I examined the patient and obtained history as noted above   1913 I rechecked the patient        Social Determinants of Health affecting care:  None    Disposition:  The patient was discharged to home.     Impression & Plan    CMS Diagnoses: None    Code Status: Prior      Medical Decision Making:  Jocelyn Weber is a 43 year old female who presents for evaluation of back pain that started after the patient attempted to lift a brick in her garden.  She has history of back pain in the past from a herniated disc.  Pain has improved with interventions in the emergency department. She had point to palpation over L3-L4 therefore CT was obtained to evaluate for any potential occult fracture and this was negative.  She had intact strength and sensation in her bilateral lower extremities.  Postvoid residual normal and she denied any saddle anesthesia.  Do not think that emergent MRI imaging was necessary, felt cauda equina less likely.  Doubt spinal epidural abscess, hematoma.  Patient was able to ambulate in the emergency department.  Recommendations provided for outpatient pain management and instructed to follow-up with her primary care provider for checkup as well as physical therapy recommendations.   Prescription for muscle relaxer and lidocaine patch prescribed.  Strict return precautions were discussed.  Patient verbalized understanding she was discharged stable condition. TCO information given.   Diagnosis:    ICD-10-CM    1. Acute midline low back pain without sciatica  M54.50            Discharge Medications:  Discharge Medication List as of 4/27/2024  8:13 PM        START taking these medications    Details   Lidocaine (LIDOCARE) 4 % Patch Place 1 patch onto the skin every 24 hours for 5 days To prevent lidocaine toxicity, patient should be patch free for 12 hrs daily.Disp-5 patch, R-0Local Print      methocarbamol (ROBAXIN) 500 MG tablet Take 1 tablet (500 mg) by mouth 4 times daily as needed for muscle spasms, Disp-28 tablet, R-0, Local Print            Scribe Disclosure:  I, Carlos Byrd, am serving as a scribe at 5:28 PM on 4/27/2024 to document services personally performed by Zarina Arora MD based on my observations and the provider's statements to me.    4/27/2024   Zarina Arora MD Wu Klasek, Connie, MD  04/28/24 2119

## 2024-04-28 NOTE — DISCHARGE INSTRUCTIONS
You were seen in the emergency department for back pain. You had a CT scan done which did not show any signs of a fracture. You have some disc disease from L5-S1 and some narrowing at the parts of the spine where the nerves come out however nothing that needs acute intervention.    These follow-up with your primary care provider and discuss physical therapy.  I have also given you the phone number for the orthopedic team you can schedule to follow-up with them in clinic as well if needed.    I have sent a prescription for muscle relaxers as well as lidocaine patch to your pharmacy.    If you start to have a fever, if you have new back pain elsewhere, if you have weakness in your legs, pain that shoots down to both knees that starts to develop, urinary or bowel incontinence, or numbness in the underwear region.

## 2024-05-03 ENCOUNTER — OFFICE VISIT (OUTPATIENT)
Dept: FAMILY MEDICINE | Facility: CLINIC | Age: 44
End: 2024-05-03
Payer: COMMERCIAL

## 2024-05-03 VITALS
RESPIRATION RATE: 16 BRPM | OXYGEN SATURATION: 97 % | BODY MASS INDEX: 43.95 KG/M2 | WEIGHT: 280 LBS | DIASTOLIC BLOOD PRESSURE: 84 MMHG | TEMPERATURE: 97.4 F | SYSTOLIC BLOOD PRESSURE: 145 MMHG | HEIGHT: 67 IN | HEART RATE: 63 BPM

## 2024-05-03 DIAGNOSIS — M54.50 ACUTE BILATERAL LOW BACK PAIN WITHOUT SCIATICA: Primary | ICD-10-CM

## 2024-05-03 PROCEDURE — 99213 OFFICE O/P EST LOW 20 MIN: CPT | Performed by: FAMILY MEDICINE

## 2024-05-03 RX ORDER — INDOMETHACIN 50 MG/1
50 CAPSULE ORAL 2 TIMES DAILY WITH MEALS
Qty: 30 CAPSULE | Refills: 0 | Status: SHIPPED | OUTPATIENT
Start: 2024-05-03 | End: 2024-08-07

## 2024-05-03 ASSESSMENT — PATIENT HEALTH QUESTIONNAIRE - PHQ9
SUM OF ALL RESPONSES TO PHQ QUESTIONS 1-9: 11
SUM OF ALL RESPONSES TO PHQ QUESTIONS 1-9: 11
10. IF YOU CHECKED OFF ANY PROBLEMS, HOW DIFFICULT HAVE THESE PROBLEMS MADE IT FOR YOU TO DO YOUR WORK, TAKE CARE OF THINGS AT HOME, OR GET ALONG WITH OTHER PEOPLE: VERY DIFFICULT

## 2024-05-03 ASSESSMENT — PAIN SCALES - GENERAL: PAINLEVEL: SEVERE PAIN (7)

## 2024-05-03 NOTE — PROGRESS NOTES
"Answers submitted by the patient for this visit:  Patient Health Questionnaire (Submitted on 5/3/2024)  If you checked off any problems, how difficult have these problems made it for you to do your work, take care of things at home, or get along with other people?: Very difficult  PHQ9 TOTAL SCORE: 11    Assessment and Plan    (M54.50) Acute bilateral low back pain without sciatica  (primary encounter diagnosis)  Comment: referring to PT, adding Rx NSAIDs to use in place of OTC ibuprofenm  Plan: Physical Therapy  Referral,         indomethacin (INDOCIN) 50 MG capsule              RTC in 1w    Cyrus Bill MD      Alistair Banks is a 43 year old, presenting for the following health issues:  Hospital F/U        5/3/2024    10:06 AM   Additional Questions   Roomed by RACHEL MUNROE   Accompanied by SELF         5/3/2024    10:06 AM   Patient Reported Additional Medications   Patient reports taking the following new medications NA     HPI       ED/UC Followup:    Facility:  [facility]   Date of visit: 4/27/24  Reason for visit: BACK PAIN     Current Status: Still in pain,. Slow moving, no relief from muscle relaxer     Pain day of injury was quite intense.  Needed to take ambulance to ER as wasn't able to move on her own.  Laying and sitting are OK, standing up and being on her feet too long can get quite painful.  Using tylenol/ibuprofen, heat, ice.  None of if terribly helpful.  Would like PT referral.        Objective    BP (!) 145/84   Pulse 63   Temp 97.4  F (36.3  C) (Oral)   Resp 16   Ht 1.702 m (5' 7\")   Wt 127 kg (280 lb)   SpO2 97%   BMI 43.85 kg/m    Body mass index is 43.85 kg/m .  Physical Exam  Vitals and nursing note reviewed.   Constitutional:       Appearance: She is well-developed.   Musculoskeletal:      Lumbar back: Spasms and tenderness present. No bony tenderness. Decreased range of motion.   Neurological:      Mental Status: She is alert and oriented to person, place, and time.      " Sensory: No sensory deficit.      Motor: No abnormal muscle tone.      Gait: Gait normal.      Deep Tendon Reflexes: Reflexes normal.      Comments: Negative SLR                Signed Electronically by: Cyrus Bill MD

## 2024-05-06 ENCOUNTER — THERAPY VISIT (OUTPATIENT)
Dept: PHYSICAL THERAPY | Facility: CLINIC | Age: 44
End: 2024-05-06
Attending: FAMILY MEDICINE
Payer: COMMERCIAL

## 2024-05-06 DIAGNOSIS — M54.50 ACUTE BILATERAL LOW BACK PAIN WITHOUT SCIATICA: ICD-10-CM

## 2024-05-06 PROCEDURE — 97530 THERAPEUTIC ACTIVITIES: CPT | Mod: GP | Performed by: PHYSICAL THERAPIST

## 2024-05-06 PROCEDURE — 97161 PT EVAL LOW COMPLEX 20 MIN: CPT | Mod: GP | Performed by: PHYSICAL THERAPIST

## 2024-05-06 PROCEDURE — 97110 THERAPEUTIC EXERCISES: CPT | Mod: GP | Performed by: PHYSICAL THERAPIST

## 2024-05-06 NOTE — PROGRESS NOTES
PHYSICAL THERAPY EVALUATION  Type of Visit: Evaluation    See electronic medical record for Abuse and Falls Screening details.    Subjective     Pt c/o LBP since gardening 4/27/2024.  Was lifting bricks when pain came on and had to be taken to ER in ambulance.   No radicular symptoms this episode, did have prior (2003). MD order date 5/3/2024.  PMH: herniated L4-5 disc resolved with PT 2003.      Ct scan-At L5-S1, moderate generalized disc disease, moderate to severe bilateral foraminal stenosis.         Presenting condition or subjective complaint: Lower back painful after injured while lifting landscape bricks  Date of onset: 04/27/24    Relevant medical history: Depression; Overweight; Significant weakness   Dates & types of surgery: 2 c sections 3/12/12, 8/19/15    Prior diagnostic imaging/testing results: MRI; CT scan; X-ray     Prior therapy history for the same diagnosis, illness or injury: Yes 2003, 6 weeks physical therapy    Prior Level of Function  Transfers: Independent  Ambulation: Independent  ADL: Independent  IADL: Childcare, Driving, Finances, Housekeeping, Laundry, Meal preparation, Medication management, Work    Living Environment  Social support: With family members   Type of home: House   Stairs to enter the home: No       Ramp: No   Stairs inside the home: Yes 8 Is there a railing: Yes   Help at home: None  Equipment owned:       Employment: Yes Remote supervisor  Hobbies/Interests: Kids sports, walking, cooking    Patient goals for therapy: Exercise without pain, stand for long periods without pain, sit at desk to work without pain    Pain assessment: Pain present     Objective   LUMBAR SPINE EVALUATION  PAIN: Pain Level at Rest: 1/10  Pain Level with Use: 6/10  Pain Location: lumbar spine  Pain Quality: Aching, Sharp, Shooting, and Stabbing  Pain Frequency: intermittent  Pain is Worst: daytime  Pain is Exacerbated By: prolonged sitting, standing, bending, lifting  Pain is Relieved By: NSAIDs,  otc medications, rest, and use  Pain Progression: Improved  INTEGUMENTARY (edema, incisions): WFL  POSTURE: WFL  GAIT:   Weightbearing Status: WBAT  Assistive Device(s): None  Gait Deviations: WFL  BALANCE/PROPRIOCEPTION:   WEIGHTBEARING ALIGNMENT:   NON-WEIGHTBEARING ALIGNMENT:    ROM:   (Degrees) Left AROM Left PROM  Right AROM Right PROM   Hip Flexion       Hip Extension       Hip Abduction       Hip Adduction       Hip Internal Rotation       Hip External Rotation       Knee Flexion       Knee Extension       Lumbar Side glide     Lumbar Flexion Min/mod loss +/-   Lumbar Extension Mod loss +. JAYLA: NE during/after     Lx ROM: SB R mod loss +, L SB min loss +/-    Pain:   End feel:   PELVIC/SI SCREEN: WFL  STRENGTH:  Fair/poor core stab mm recruitment.  VC for TA firing    MYOTOMES: WNL  DTR S:   CORD SIGNS:   DERMATOMES: WNL  NEURAL TENSION: Lumbar WNL  FLEXIBILITY:   HS L>R  LUMBAR/HIP Special Tests: WFL   PELVIS/SI SPECIAL TESTS:   FUNCTIONAL TESTS:   PALPATION:  mild TTP Lx paraspinals   SPINAL SEGMENTAL CONCLUSIONS:  mild hypomobile L2-4       Assessment & Plan   CLINICAL IMPRESSIONS  Medical Diagnosis: Acute bilateral low back pain without sciatica    Treatment Diagnosis: LBP   Impression/Assessment: Patient is a 43 year old female with LBP complaints.  The following significant findings have been identified: Pain, Decreased ROM/flexibility, Decreased joint mobility, Decreased strength, Impaired gait, Impaired muscle performance, Decreased activity tolerance, and Impaired posture. These impairments interfere with their ability to perform self care tasks, work tasks, recreational activities, household chores, driving , household mobility, and community mobility as compared to previous level of function.     Clinical Decision Making (Complexity):  Clinical Presentation: Stable/Uncomplicated  Clinical Presentation Rationale: based on medical and personal factors listed in PT evaluation  Clinical  Decision Making (Complexity): Low complexity    PLAN OF CARE  Treatment Interventions:  Modalities: Ultrasound  Interventions: Manual Therapy, Neuromuscular Re-education, Therapeutic Activity, Therapeutic Exercise    Long Term Goals     PT Goal 1  Goal Identifier: Sitting  Goal Description: Be able to sit 1+ hour  painfree  Rationale:  (for personal hygiene;to allow rest from standing;for community transportation;for job requirements in their work place)  Target Date: 07/01/24      Frequency of Treatment: 1x/week  Duration of Treatment: 8 weeks    Recommended Referrals to Other Professionals:   Education Assessment:   Learner/Method: Patient;Demonstration;Pictures/Video  Education Comments: online    Risks and benefits of evaluation/treatment have been explained.   Patient/Family/caregiver agrees with Plan of Care.     Evaluation Time:     PT Eval, Low Complexity Minutes (00214): 15       Signing Clinician: Michael Cabrera PT

## 2024-05-10 ENCOUNTER — ANCILLARY PROCEDURE (OUTPATIENT)
Dept: MAMMOGRAPHY | Facility: CLINIC | Age: 44
End: 2024-05-10
Attending: FAMILY MEDICINE
Payer: COMMERCIAL

## 2024-05-10 DIAGNOSIS — Z12.31 VISIT FOR SCREENING MAMMOGRAM: ICD-10-CM

## 2024-05-10 PROCEDURE — 77067 SCR MAMMO BI INCL CAD: CPT | Mod: TC | Performed by: RADIOLOGY

## 2024-05-10 PROCEDURE — 77063 BREAST TOMOSYNTHESIS BI: CPT | Mod: TC | Performed by: RADIOLOGY

## 2024-05-14 ENCOUNTER — THERAPY VISIT (OUTPATIENT)
Dept: PHYSICAL THERAPY | Facility: CLINIC | Age: 44
End: 2024-05-14
Attending: FAMILY MEDICINE
Payer: COMMERCIAL

## 2024-05-14 DIAGNOSIS — M54.50 ACUTE BILATERAL LOW BACK PAIN WITHOUT SCIATICA: Primary | ICD-10-CM

## 2024-05-14 PROCEDURE — 97112 NEUROMUSCULAR REEDUCATION: CPT | Mod: GP | Performed by: PHYSICAL THERAPIST

## 2024-05-14 PROCEDURE — 97110 THERAPEUTIC EXERCISES: CPT | Mod: GP | Performed by: PHYSICAL THERAPIST

## 2024-05-21 ENCOUNTER — THERAPY VISIT (OUTPATIENT)
Dept: PHYSICAL THERAPY | Facility: CLINIC | Age: 44
End: 2024-05-21
Attending: FAMILY MEDICINE
Payer: COMMERCIAL

## 2024-05-21 DIAGNOSIS — M54.50 ACUTE BILATERAL LOW BACK PAIN WITHOUT SCIATICA: Primary | ICD-10-CM

## 2024-05-21 PROCEDURE — 97110 THERAPEUTIC EXERCISES: CPT | Mod: GP | Performed by: PHYSICAL THERAPIST

## 2024-05-21 PROCEDURE — 97112 NEUROMUSCULAR REEDUCATION: CPT | Mod: GP | Performed by: PHYSICAL THERAPIST

## 2024-07-08 NOTE — PROGRESS NOTES
DISCHARGE  Reason for Discharge: pt showing good pain and functional improvement with established HEP    Equipment Issued: none    Discharge Plan: Patient to continue home program.    Referring Provider:  Cyrus Bill       05/21/24 0500   Appointment Info   Signing clinician's name / credentials Michael Cabrera PT   Total/Authorized Visits 8   Visits Used 3   Medical Diagnosis Acute bilateral low back pain without sciatica   PT Tx Diagnosis LBP   Progress Note/Certification   Onset of illness/injury or Date of Surgery 04/27/24   Therapy Frequency 1x/week   Predicted Duration 8 weeks   Progress Note Completed Date 05/06/24   PT Goal 1   Goal Identifier Sitting   Goal Description Be able to sit 1+ hour  painfree   Rationale   (for personal hygiene;to allow rest from standing;for community transportation;for job requirements in their work place)   Goal Progress Sat for 2hr meeting 0-1/10 pain   Target Date 07/01/24   Subjective Report   Subjective Report Doing very well, some stiffnes with long meeting (sitting) but no pain.  HEP very helpful   Objective Measures   Objective Measures Objective Measure 1;Objective Measure 2   Objective Measure 1   Objective Measure Pain 6/10 at IE   Details pain 0-1/10   Objective Measure 2   Objective Measure Lx ROM: flex ERT-, ext min loss -.  JAYLA: increased ROM after. B ERT -   Details Good postural xontrol with row/pulldown and side pull   Treatment Interventions (PT)   Interventions Therapeutic Procedure/Exercise;Therapeutic Activity;Neuromuscular Re-education   Therapeutic Procedure/Exercise   Therapeutic Procedures: strength, endurance, ROM, flexibility minutes (17474) 15   PTRx Ther Proc 1 Prone On Elbows   PTRx Ther Proc 1 - Details 5'. progress to JAYLA   PTRx Ther Proc 2 Supine Lumbar Hip Roll   PTRx Ther Proc 2 - Details 10x 5 sec hold B   PTRx Ther Proc 3 Roll Ins   PTRx Ther Proc 3 - Details 10x 5 sec hold min cuing for posture and core mm activation     PTRx Ther Proc 4 Hip AROM Standing Abduction   PTRx Ther Proc 4 - Details 10x B min postural cuing   PTRx Ther Proc 5 Hip AROM Standing Extension   PTRx Ther Proc 5 - Details 10x B min postural cuing   Therapeutic Activity   Ther Act 1 - Details Discussed Dx/anatomy, POC, pt goals and self control measures (brief body mechanics)   Neuromuscular Re-education   Neuromuscular re-ed of mvmt, balance, coord, kinesthetic sense, posture, proprioception minutes (19460) 10   PTRx Neuro Re-ed 1 Abdominal Brace Transverse Abdominis   PTRx Neuro Re-ed 1 - Details rev   PTRx Neuro Re-ed 2 Shoulder Theraband Low Row/Pulldown   PTRx Neuro Re-ed 2 - Details 10x GTB postural cuing   PTRx Neuro Re-ed 3 Shoulder Theraband Rows   PTRx Neuro Re-ed 3 - Details 10x GTB postural cuing   PTRx Neuro Re-ed 4 Tubing Side Pulls   PTRx Neuro Re-ed 4 - Details 10x B GTB   Education   Learner/Method Patient;Demonstration;Pictures/Video   Education Comments online   Plan   Home program See PTRx   Updates to plan of care prn x 1 month. DC with HEP if no return   Total Session Time   Timed Code Treatment Minutes 25   Total Treatment Time (sum of timed and untimed services) 25

## 2024-08-07 ENCOUNTER — OFFICE VISIT (OUTPATIENT)
Dept: FAMILY MEDICINE | Facility: CLINIC | Age: 44
End: 2024-08-07
Payer: COMMERCIAL

## 2024-08-07 ENCOUNTER — TELEPHONE (OUTPATIENT)
Dept: FAMILY MEDICINE | Facility: CLINIC | Age: 44
End: 2024-08-07

## 2024-08-07 VITALS
BODY MASS INDEX: 46.45 KG/M2 | WEIGHT: 289 LBS | TEMPERATURE: 97.9 F | RESPIRATION RATE: 20 BRPM | HEART RATE: 64 BPM | DIASTOLIC BLOOD PRESSURE: 86 MMHG | SYSTOLIC BLOOD PRESSURE: 129 MMHG | HEIGHT: 66 IN | OXYGEN SATURATION: 97 %

## 2024-08-07 DIAGNOSIS — E78.5 HYPERLIPIDEMIA LDL GOAL <130: ICD-10-CM

## 2024-08-07 DIAGNOSIS — E66.01 MORBID OBESITY (H): ICD-10-CM

## 2024-08-07 DIAGNOSIS — F33.1 MODERATE EPISODE OF RECURRENT MAJOR DEPRESSIVE DISORDER (H): ICD-10-CM

## 2024-08-07 DIAGNOSIS — Z00.00 ROUTINE GENERAL MEDICAL EXAMINATION AT A HEALTH CARE FACILITY: Primary | ICD-10-CM

## 2024-08-07 DIAGNOSIS — Z30.41 ENCOUNTER FOR BIRTH CONTROL PILLS MAINTENANCE: ICD-10-CM

## 2024-08-07 PROBLEM — J30.2 SEASONAL ALLERGIES: Status: ACTIVE | Noted: 2024-08-07

## 2024-08-07 PROBLEM — G89.29 CHRONIC BILATERAL LOW BACK PAIN WITHOUT SCIATICA: Status: ACTIVE | Noted: 2024-05-06

## 2024-08-07 PROCEDURE — 99396 PREV VISIT EST AGE 40-64: CPT | Mod: 25 | Performed by: STUDENT IN AN ORGANIZED HEALTH CARE EDUCATION/TRAINING PROGRAM

## 2024-08-07 PROCEDURE — 99214 OFFICE O/P EST MOD 30 MIN: CPT | Mod: 25 | Performed by: STUDENT IN AN ORGANIZED HEALTH CARE EDUCATION/TRAINING PROGRAM

## 2024-08-07 PROCEDURE — 90746 HEPB VACCINE 3 DOSE ADULT IM: CPT | Performed by: STUDENT IN AN ORGANIZED HEALTH CARE EDUCATION/TRAINING PROGRAM

## 2024-08-07 PROCEDURE — 90471 IMMUNIZATION ADMIN: CPT | Performed by: STUDENT IN AN ORGANIZED HEALTH CARE EDUCATION/TRAINING PROGRAM

## 2024-08-07 SDOH — HEALTH STABILITY: PHYSICAL HEALTH: ON AVERAGE, HOW MANY DAYS PER WEEK DO YOU ENGAGE IN MODERATE TO STRENUOUS EXERCISE (LIKE A BRISK WALK)?: 2 DAYS

## 2024-08-07 ASSESSMENT — ANXIETY QUESTIONNAIRES
GAD7 TOTAL SCORE: 10
2. NOT BEING ABLE TO STOP OR CONTROL WORRYING: SEVERAL DAYS
1. FEELING NERVOUS, ANXIOUS, OR ON EDGE: MORE THAN HALF THE DAYS
7. FEELING AFRAID AS IF SOMETHING AWFUL MIGHT HAPPEN: SEVERAL DAYS
GAD7 TOTAL SCORE: 10
3. WORRYING TOO MUCH ABOUT DIFFERENT THINGS: SEVERAL DAYS
7. FEELING AFRAID AS IF SOMETHING AWFUL MIGHT HAPPEN: SEVERAL DAYS
5. BEING SO RESTLESS THAT IT IS HARD TO SIT STILL: MORE THAN HALF THE DAYS
4. TROUBLE RELAXING: MORE THAN HALF THE DAYS
GAD7 TOTAL SCORE: 10
8. IF YOU CHECKED OFF ANY PROBLEMS, HOW DIFFICULT HAVE THESE MADE IT FOR YOU TO DO YOUR WORK, TAKE CARE OF THINGS AT HOME, OR GET ALONG WITH OTHER PEOPLE?: SOMEWHAT DIFFICULT
6. BECOMING EASILY ANNOYED OR IRRITABLE: SEVERAL DAYS
IF YOU CHECKED OFF ANY PROBLEMS ON THIS QUESTIONNAIRE, HOW DIFFICULT HAVE THESE PROBLEMS MADE IT FOR YOU TO DO YOUR WORK, TAKE CARE OF THINGS AT HOME, OR GET ALONG WITH OTHER PEOPLE: SOMEWHAT DIFFICULT

## 2024-08-07 ASSESSMENT — PAIN SCALES - GENERAL: PAINLEVEL: NO PAIN (0)

## 2024-08-07 ASSESSMENT — SOCIAL DETERMINANTS OF HEALTH (SDOH): HOW OFTEN DO YOU GET TOGETHER WITH FRIENDS OR RELATIVES?: ONCE A WEEK

## 2024-08-07 ASSESSMENT — PATIENT HEALTH QUESTIONNAIRE - PHQ9
SUM OF ALL RESPONSES TO PHQ QUESTIONS 1-9: 10
SUM OF ALL RESPONSES TO PHQ QUESTIONS 1-9: 10
10. IF YOU CHECKED OFF ANY PROBLEMS, HOW DIFFICULT HAVE THESE PROBLEMS MADE IT FOR YOU TO DO YOUR WORK, TAKE CARE OF THINGS AT HOME, OR GET ALONG WITH OTHER PEOPLE: SOMEWHAT DIFFICULT

## 2024-08-07 NOTE — PROGRESS NOTES
"Preventive Care Visit  [facility] CHAPARRITAMOJUANA Dai MD, Family Practice  Aug 7, 2024    Assessment & Plan     Routine general medical examination at a health care facility  Doing well overall. Not fasting thus will order future lab visit for lipids and diabetes screening lab. UTD on mammogram and pap smear. Discussed vaccination recommendations.  - Basic metabolic panel  (Ca, Cl, CO2, Creat, Gluc, K, Na, BUN)    Hyperlipidemia LDL goal <130  Not on statin therapy.    - Lipid panel reflex to direct LDL Fasting    Moderate episode of recurrent major depressive disorder (H)  Feels mood is stable, controlled on 50mg sertraline. Was following with a therapist, recently \"graduated\". No concerns. Ok to refill for 6 months.  - sertraline (ZOLOFT) 50 MG tablet  Dispense: 90 tablet; Refill: 1    Morbid obesity (H)  Has tried several weight loss medications in the past including phentermine, Qsymia without ongoing success. Currently on Contrave for past year without any weight loss. Had in depth discussion regarding weight and weight loss strategies today. Discussed core of weight management is lifestyle emphasis and can be assisted with long-term medication adjuvant. She is interested in starting semaglutide, common risks and rare risk of pancreatitis, ileus were discussed as well as BBW for potential thyroid cancer. No increased risk factors for thyroid cancer. Provided realistic weight loss expectations for patient. Plan to stop Contrave. Follow up in 3 months to reassess.   - semaglutide (OZEMPIC) 2 MG/3ML pen  Dispense: 3 mL; Refill: 0  - send lifecake message after 1 month of semaglutide prescription, if tolerating well can increase to 0.5mg dosage for 2 months    Encounter for birth control pills maintenance  On OCP. No contraindications. Doing well. No issues. Ok to refill for 1 yr.    BMI  Estimated body mass index is 46.29 kg/m  as calculated from the following:    Height as of this encounter: " "1.683 m (5' 6.25\").    Weight as of this encounter: 131.1 kg (289 lb).     Counseling  Appropriate preventive services were discussed with this patient.  Checklist reviewing preventive services available has been given to the patient.     Follow up in 3 months for weight loss    Jose David Dai MD  [facility]  8/7/2024      Alistair Banks is a 44 year old, presenting for the following:  Physical    Weight loss  Is taking Contrave. Doesn't feel like this is working.  Does decrease food noise but hasn't noticed any change in weight whatsoever.   Is exercising routinely. Was been on max dose for the past 6 months.   Has tried phentermine in the past but gained all weight plus additional weight back.  Does have anxiety so this is not ideal.  Also took topiramate along with phentermine which helped for a little bit but again gained all the weight back.    RAISSA  Taking sertraline 50 mg daily. Feels stable on this dose.  Was following with a therapist, hasn't seen in the past couple of month as she \"graduated\"   Was doing this monthly.     OCP  No contraindication including migraines, DVT/VTE hx, smoking, HTN.  No longer having PMS symptoms either.     Graduated from PT 2/2 back pain.         8/7/2024     1:05 PM   Additional Questions   Roomed by Kelly         8/7/2024     1:05 PM   Patient Reported Additional Medications   Patient reports taking the following new medications none        Health Care Directive  Patient does not have a Health Care Directive or Living Will: Discussed advance care planning with patient; however, patient declined at this time.    HPI        8/7/2024   General Health   How would you rate your overall physical health? (!) FAIR   Feel stress (tense, anxious, or unable to sleep) Rather much      (!) STRESS CONCERN      8/7/2024   Nutrition   Three or more servings of calcium each day? Yes   Diet: Regular (no restrictions)   How many servings of fruit and vegetables per day? " (!) 2-3   How many sweetened beverages each day? (!) 2            8/7/2024   Exercise   Days per week of moderate/strenous exercise 2 days      (!) EXERCISE CONCERN      8/7/2024   Social Factors   Frequency of gathering with friends or relatives Once a week   Worry food won't last until get money to buy more No   Food not last or not have enough money for food? No   Do you have housing? (Housing is defined as stable permanent housing and does not include staying ouside in a car, in a tent, in an abandoned building, in an overnight shelter, or couch-surfing.) Yes   Are you worried about losing your housing? No   Lack of transportation? No   Unable to get utilities (heat,electricity)? No            8/7/2024   Dental   Dentist two times every year? Yes            8/7/2024   TB Screening   Were you born outside of the [place]? No      Today's PHQ-9 Score:       8/7/2024    12:54 PM   PHQ-9 SCORE   PHQ-9 Total Score MyChart 10 (Moderate depression)   PHQ-9 Total Score 10         8/7/2024   Substance Use   Alcohol more than 3/day or more than 7/wk No   Do you use any other substances recreationally? (!) CANNABIS PRODUCTS    (!) PRESCRIPTION DRUGS       Multiple values from one day are sorted in reverse-chronological order     Social History     Tobacco Use    Smoking status: Former     Passive exposure: Past    Smokeless tobacco: Never    Tobacco comments:     social smoker in her 20's   Vaping Use    Vaping status: Never Used   Substance Use Topics    Alcohol use: Yes     Comment: occasionally    Drug use: No         5/10/2024   LAST FHS-7 RESULTS   1st degree relative breast or ovarian cancer No   Any relative bilateral breast cancer No   Any male have breast cancer No   Any ONE woman have BOTH breast AND ovarian cancer No   Any woman with breast cancer before 50yrs No   2 or more relatives with breast AND/OR ovarian cancer No   2 or more relatives with breast AND/OR bowel cancer No      Mammogram Screening - Mammogram  "every 1-2 years updated in Health Maintenance based on mutual decision making        8/7/2024   STI Screening   New sexual partner(s) since last STI/HIV test? No      History of abnormal Pap smear: No - age 30- 64 PAP with HPV every 5 years recommended        Latest Ref Rng & Units 8/15/2023     9:00 AM 9/11/2018     9:10 AM 9/11/2018     8:42 AM   PAP / HPV   PAP  Negative for Intraepithelial Lesion or Malignancy (NILM)      PAP (Historical)    NIL    HPV 16 DNA Negative Negative  Negative     HPV 18 DNA Negative Negative  Negative     Other HR HPV Negative Negative  Negative       ASCVD Risk   The 10-year ASCVD risk score (Joey ALONSO, et al., 2019) is: 1.1%    Values used to calculate the score:      Age: 44 years      Sex: Female      Is Non- : No      Diabetic: No      Tobacco smoker: No      Systolic Blood Pressure: 129 mmHg      Is BP treated: No      HDL Cholesterol: 57 mg/dL      Total Cholesterol: 273 mg/dL        8/7/2024   Contraception/Family Planning   Questions about contraception or family planning No      Reviewed and updated as needed this visit by Provider   Tobacco  Allergies  Meds   Med Hx  Surg Hx  Fam Hx               Objective    Exam  /86 (BP Location: Right arm, Patient Position: Sitting, Cuff Size: Adult Large)   Pulse 64   Temp 97.9  F (36.6  C) (Oral)   Resp 20   Ht 1.683 m (5' 6.25\")   Wt 131.1 kg (289 lb)   LMP  (LMP Unknown)   SpO2 97%   BMI 46.29 kg/m     Estimated body mass index is 46.29 kg/m  as calculated from the following:    Height as of this encounter: 1.683 m (5' 6.25\").    Weight as of this encounter: 131.1 kg (289 lb).    Physical Exam  GENERAL: healthy, alert and no distress  HEAD: Normocephalic, atraumatic.   EYES: PERRL. Normal conjunctivae, sclera.   ENT: Normal EAC and TMs bilaterally. Normal oropharynx.   NECK: Supple. No lymphadenopathy appreciated. Trachea midline. Thyroid not enlarged, not TTP.  RESP: lungs clear " to auscultation - no rales, rhonchi or wheezes  CV: regular rate and rhythm, normal S1 S2, no murmur, click, rub or gallop.  Trace pitting edema up to midshins bilaterally.   ABDOMEN: soft, no TTP x4 quadrants. No hepatomegaly or masses appreciated. BS normactive.  MSK: no gross musculoskeletal defects noted.  SKIN: no suspicious lesions or rashes.  EXT: Warm and well perfused.   NEURO: CNII-XII grossly intact. No focal deficits.  PSYCH: Groomed, dressed appropriately for weather. Logical, linear thought process. Normal mood with consistent affect.     Signed Electronically by: Jose David Dai MD

## 2024-08-07 NOTE — PATIENT INSTRUCTIONS
Patient Education   Preventive Care Advice   This is general advice given by our system to help you stay healthy. However, your care team may have specific advice just for you. Please talk to your care team about your preventive care needs.  Nutrition  Eat 5 or more servings of fruits and vegetables each day.  Try wheat bread, brown rice and whole grain pasta (instead of white bread, rice, and pasta).  Get enough calcium and vitamin D. Check the label on foods and aim for 100% of the RDA (recommended daily allowance).  Lifestyle  Exercise at least 150 minutes each week  (30 minutes a day, 5 days a week).  Do muscle strengthening activities 2 days a week. These help control your weight and prevent disease.  No smoking.  Wear sunscreen to prevent skin cancer.  Have a dental exam and cleaning every 6 months.  Yearly exams  See your health care team every year to talk about:  Any changes in your health.  Any medicines your care team has prescribed.  Preventive care, family planning, and ways to prevent chronic diseases.  Shots (vaccines)   HPV shots (up to age 26), if you've never had them before.  Hepatitis B shots (up to age 59), if you've never had them before.  COVID-19 shot: Get this shot when it's due.  Flu shot: Get a flu shot every year.  Tetanus shot: Get a tetanus shot every 10 years.  Pneumococcal, hepatitis A, and RSV shots: Ask your care team if you need these based on your risk.  Shingles shot (for age 50 and up)  General health tests  Diabetes screening:  Starting at age 35, Get screened for diabetes at least every 3 years.  If you are younger than age 35, ask your care team if you should be screened for diabetes.  Cholesterol test: At age 39, start having a cholesterol test every 5 years, or more often if advised.  Bone density scan (DEXA): At age 50, ask your care team if you should have this scan for osteoporosis (brittle bones).  Hepatitis C: Get tested at least once in your life.  STIs (sexually  transmitted infections)  Before age 24: Ask your care team if you should be screened for STIs.  After age 24: Get screened for STIs if you're at risk. You are at risk for STIs (including HIV) if:  You are sexually active with more than one person.  You don't use condoms every time.  You or a partner was diagnosed with a sexually transmitted infection.  If you are at risk for HIV, ask about PrEP medicine to prevent HIV.  Get tested for HIV at least once in your life, whether you are at risk for HIV or not.  Cancer screening tests  Cervical cancer screening: If you have a cervix, begin getting regular cervical cancer screening tests starting at age 21.  Breast cancer scan (mammogram): If you've ever had breasts, begin having regular mammograms starting at age 40. This is a scan to check for breast cancer.  Colon cancer screening: It is important to start screening for colon cancer at age 45.  Have a colonoscopy test every 10 years (or more often if you're at risk) Or, ask your provider about stool tests like a FIT test every year or Cologuard test every 3 years.  To learn more about your testing options, visit:   .  For help making a decision, visit:   https://bit.ly/js38375.  Prostate cancer screening test: If you have a prostate, ask your care team if a prostate cancer screening test (PSA) at age 55 is right for you.  Lung cancer screening: If you are a current or former smoker ages 50 to 80, ask your care team if ongoing lung cancer screenings are right for you.  For informational purposes only. Not to replace the advice of your health care provider. Copyright   2023 [facility]. All rights reserved. Clinically reviewed by the [facility]. GuideIT 587918 - REV 01/24.  Learning About Stress  What is stress?     Stress is your body's response to a hard situation. Your body can have a physical, emotional, or mental response. Stress is a fact of life for most people, and it  affects everyone differently. What causes stress for you may not be stressful for someone else.  A lot of things can cause stress. You may feel stress when you go on a job interview, take a test, or run a race. This kind of short-term stress is normal and even useful. It can help you if you need to work hard or react quickly. For example, stress can help you finish an important job on time.  Long-term stress is caused by ongoing stressful situations or events. Examples of long-term stress include long-term health problems, ongoing problems at work, or conflicts in your family. Long-term stress can harm your health.  How does stress affect your health?  When you are stressed, your body responds as though you are in danger. It makes hormones that speed up your heart, make you breathe faster, and give you a burst of energy. This is called the fight-or-flight stress response. If the stress is over quickly, your body goes back to normal and no harm is done.  But if stress happens too often or lasts too long, it can have bad effects. Long-term stress can make you more likely to get sick, and it can make symptoms of some diseases worse. If you tense up when you are stressed, you may develop neck, shoulder, or low back pain. Stress is linked to high blood pressure and heart disease.  Stress also harms your emotional health. It can make you jeronimo, tense, or depressed. Your relationships may suffer, and you may not do well at work or school.  What can you do to manage stress?  You can try these things to help manage stress:   Do something active. Exercise or activity can help reduce stress. Walking is a great way to get started. Even everyday activities such as housecleaning or yard work can help.  Try yoga or alfredo chi. These techniques combine exercise and meditation. You may need some training at first to learn them.  Do something you enjoy. For example, listen to music or go to a movie. Practice your hobby or do volunteer  "work.  Meditate. This can help you relax, because you are not worrying about what happened before or what may happen in the future.  Do guided imagery. Imagine yourself in any setting that helps you feel calm. You can use online videos, books, or a teacher to guide you.  Do breathing exercises. For example:  From a standing position, bend forward from the waist with your knees slightly bent. Let your arms dangle close to the floor.  Breathe in slowly and deeply as you return to a standing position. Roll up slowly and lift your head last.  Hold your breath for just a few seconds in the standing position.  Breathe out slowly and bend forward from the waist.  Let your feelings out. Talk, laugh, cry, and express anger when you need to. Talking with supportive friends or family, a counselor, or a fabienne leader about your feelings is a healthy way to relieve stress. Avoid discussing your feelings with people who make you feel worse.  Write. It may help to write about things that are bothering you. This helps you find out how much stress you feel and what is causing it. When you know this, you can find better ways to cope.  What can you do to prevent stress?  You might try some of these things to help prevent stress:  Manage your time. This helps you find time to do the things you want and need to do.  Get enough sleep. Your body recovers from the stresses of the day while you are sleeping.  Get support. Your family, friends, and community can make a difference in how you experience stress.  Limit your news feed. Avoid or limit time on social media or news that may make you feel stressed.  Do something active. Exercise or activity can help reduce stress. Walking is a great way to get started.  Where can you learn more?  Go to https://www.healthwise.net/patiented  Enter N032 in the search box to learn more about \"Learning About Stress.\"  Current as of: October 24, 2023               Content Version: 14.0    1489-4101 " [employer].   Care instructions adapted under license by your healthcare professional. If you have questions about a medical condition or this instruction, always ask your healthcare professional. [employer] disclaims any warranty or liability for your use of this information.      Learning About Depression Screening  What is depression screening?  Depression screening is a way to see if you have depression symptoms. It may be done by a doctor or counselor. It's often part of a routine checkup. That's because your mental health is just as important as your physical health.  Depression is a mental health condition that affects how you feel, think, and act. You may:  Have less energy.  Lose interest in your daily activities.  Feel sad and grouchy for a long time.  Depression is very common. It affects people of all ages.  Many things can lead to depression. Some people become depressed after they have a stroke or find out they have a major illness like cancer or heart disease. The death of a loved one or a breakup may lead to depression. It can run in families. Most experts believe that a combination of inherited genes and stressful life events can cause it.  What happens during screening?  You may be asked to fill out a form about your depression symptoms. You and the doctor will discuss your answers. The doctor may ask you more questions to learn more about how you think, act, and feel.  What happens after screening?  If you have symptoms of depression, your doctor will talk to you about your options.  Doctors usually treat depression with medicines or counseling. Often, combining the two works best. Many people don't get help because they think that they'll get over the depression on their own. But people with depression may not get better unless they get treatment.  The cause of depression is not well understood. There may be many factors involved. But if you have depression, it's not  "your fault.  A serious symptom of depression is thinking about death or suicide. If you or someone you care about talks about this or about feeling hopeless, get help right away.  It's important to know that depression can be treated. Medicine, counseling, and self-care may help.  Where can you learn more?  Go to https://www.[employer]/patiented  Enter T185 in the search box to learn more about \"Learning About Depression Screening.\"  Current as of: June 24, 2023  Content Version: 14.1 2006-2024 [employer].   Care instructions adapted under license by your healthcare professional. If you have questions about a medical condition or this instruction, always ask your healthcare professional. [employer] disclaims any warranty or liability for your use of this information.    Substance Use Disorder: Care Instructions  Overview     You can improve your life and health by stopping your use of alcohol or drugs. When you don't drink or use drugs, you may feel and sleep better. You may get along better with your family, friends, and coworkers. There are medicines and programs that can help with substance use disorder.  How can you care for yourself at home?  Here are some ways to help you stay sober and prevent relapse.  If you have been given medicine to help keep you sober or reduce your cravings, be sure to take it exactly as prescribed.  Talk to your doctor about programs that can help you stop using drugs or drinking alcohol.  Do not keep alcohol or drugs in your home.  Plan ahead. Think about what you'll say if other people ask you to drink or use drugs. Try not to spend time with people who drink or use drugs.  Use the time and money spent on drinking or drugs to do something that's important to you.  Preventing a relapse  Have a plan to deal with relapse. Learn to recognize changes in your thinking that lead you to drink or use drugs. Get help before you start to drink or use drugs " again.  Try to stay away from situations, friends, or places that may lead you to drink or use drugs.  If you feel the need to drink alcohol or use drugs again, seek help right away. Call a trusted friend or family member. Some people get support from organizations such as Narcotics Anonymous or [employer] or from treatment facilities.  If you relapse, get help as soon as you can. Some people make a plan with another person that outlines what they want that person to do for them if they relapse. The plan usually includes how to handle the relapse and who to notify in case of relapse.  Don't give up. Remember that a relapse doesn't mean that you have failed. Use the experience to learn the triggers that lead you to drink or use drugs. Then quit again. Recovery is a lifelong process. Many people have several relapses before they are able to quit for good.  Follow-up care is a key part of your treatment and safety. Be sure to make and go to all appointments, and call your doctor if you are having problems. It's also a good idea to know your test results and keep a list of the medicines you take.  When should you call for help?   Call 501  anytime you think you may need emergency care. For example, call if you or someone else:    Has overdosed or has withdrawal signs. Be sure to tell the emergency workers that you are or someone else is using or trying to quit using drugs. Overdose or withdrawal signs may include:  Losing consciousness.  Seizure.  Seeing or hearing things that aren't there (hallucinations).     Is thinking or talking about suicide or harming others.   Where to get help 24 hours a day, 7 days a week   If you or someone you know talks about suicide, self-harm, a mental health crisis, a substance use crisis, or any other kind of emotional distress, get help right away. You can:    Call the Suicide and Crisis Lifeline at 536.     Call 9-697-961-TALK (1-981.509.6124).     Text HOME to 441764 to access  "the Crisis Text Line.   Consider saving these numbers in your phone.  Go to [employer] for more information or to chat online.  Call your doctor now or seek immediate medical care if:    You are having withdrawal symptoms. These may include nausea or vomiting, sweating, shakiness, and anxiety.   Watch closely for changes in your health, and be sure to contact your doctor if:    You have a relapse.     You need more help or support to stop.   Where can you learn more?  Go to https://www.[employer].net/patiented  Enter H573 in the search box to learn more about \"Substance Use Disorder: Care Instructions.\"  Current as of: November 15, 2023               Content Version: 14.0    3810-8031 [employer].   Care instructions adapted under license by your healthcare professional. If you have questions about a medical condition or this instruction, always ask your healthcare professional. [employer], [employer] disclaims any warranty or liability for your use of this information.         "

## 2024-08-07 NOTE — TELEPHONE ENCOUNTER
Retail Pharmacy Prior Authorization Team   Phone: [phone]    PRIOR AUTHORIZATION DENIED    Medication: OZEMPIC (0.25 OR 0.5 MG/DOSE) 2 MG/3ML SC SOPN  Insurance Company: [employer] ([facility]) - Phone [phone] Fax [phone]  Denial Date: 8/7/2024  Denial Reason(s): MUST HAVE TYPE 2 DIABETES AND   PROVIDE DOCUMENTATION CONFIRMING DX      Appeal Information: IF THE PROVIDER WOULD LIKE TO APPEAL THIS DECISION PLEASE PROVIDE THE PA TEAM WITH A LETTER OF MEDICAL NECESSITY        Patient Notified: NO

## 2024-08-08 ENCOUNTER — MYC MEDICAL ADVICE (OUTPATIENT)
Dept: FAMILY MEDICINE | Facility: CLINIC | Age: 44
End: 2024-08-08
Payer: COMMERCIAL

## 2024-08-08 NOTE — TELEPHONE ENCOUNTER
Reviewed.    Ozempic is not indicated for patient. This was ordered by mistake. I will cancel and order Wegovy as this medication is to be used for weight loss.    Closing out this encounter.    Jose David Dai MD  [facility]  8/8/2024

## 2024-08-08 NOTE — TELEPHONE ENCOUNTER
See pt's Knottykart message.     Replied via Knottykart.     Becky HICKS, RN   Clinic RN  [facility]

## 2024-08-15 ENCOUNTER — LAB (OUTPATIENT)
Dept: LAB | Facility: CLINIC | Age: 44
End: 2024-08-15
Payer: COMMERCIAL

## 2024-08-15 DIAGNOSIS — Z00.00 ROUTINE GENERAL MEDICAL EXAMINATION AT A HEALTH CARE FACILITY: ICD-10-CM

## 2024-08-15 DIAGNOSIS — E78.5 HYPERLIPIDEMIA LDL GOAL <130: ICD-10-CM

## 2024-08-15 LAB
ANION GAP SERPL CALCULATED.3IONS-SCNC: 10 MMOL/L (ref 7–15)
BUN SERPL-MCNC: 14.2 MG/DL (ref 6–20)
CALCIUM SERPL-MCNC: 9.2 MG/DL (ref 8.8–10.4)
CHLORIDE SERPL-SCNC: 103 MMOL/L (ref 98–107)
CHOLEST SERPL-MCNC: 237 MG/DL
CREAT SERPL-MCNC: 0.73 MG/DL (ref 0.51–0.95)
EGFRCR SERPLBLD CKD-EPI 2021: >90 ML/MIN/1.73M2
FASTING STATUS PATIENT QL REPORTED: YES
FASTING STATUS PATIENT QL REPORTED: YES
GLUCOSE SERPL-MCNC: 85 MG/DL (ref 70–99)
HCO3 SERPL-SCNC: 23 MMOL/L (ref 22–29)
HDLC SERPL-MCNC: 50 MG/DL
LDLC SERPL CALC-MCNC: 128 MG/DL
NONHDLC SERPL-MCNC: 187 MG/DL
POTASSIUM SERPL-SCNC: 4.4 MMOL/L (ref 3.4–5.3)
SODIUM SERPL-SCNC: 136 MMOL/L (ref 135–145)
TRIGL SERPL-MCNC: 293 MG/DL

## 2024-08-15 PROCEDURE — 36415 COLL VENOUS BLD VENIPUNCTURE: CPT

## 2024-08-15 PROCEDURE — 80061 LIPID PANEL: CPT

## 2024-08-15 PROCEDURE — 80048 BASIC METABOLIC PNL TOTAL CA: CPT

## 2024-09-03 ENCOUNTER — MYC MEDICAL ADVICE (OUTPATIENT)
Dept: FAMILY MEDICINE | Facility: CLINIC | Age: 44
End: 2024-09-03
Payer: COMMERCIAL

## 2024-09-03 DIAGNOSIS — E66.01 MORBID OBESITY (H): Primary | ICD-10-CM

## 2024-09-17 DIAGNOSIS — N92.4 EXCESSIVE BLEEDING IN PREMENOPAUSAL PERIOD: ICD-10-CM

## 2024-09-17 RX ORDER — NORGESTIMATE AND ETHINYL ESTRADIOL 0.25-0.035
KIT ORAL
Qty: 112 TABLET | Refills: 0 | Status: SHIPPED | OUTPATIENT
Start: 2024-09-17

## 2024-10-30 ENCOUNTER — MYC REFILL (OUTPATIENT)
Dept: FAMILY MEDICINE | Facility: CLINIC | Age: 44
End: 2024-10-30
Payer: COMMERCIAL

## 2024-10-30 DIAGNOSIS — E66.01 MORBID OBESITY (H): ICD-10-CM

## 2024-11-05 ENCOUNTER — MYC MEDICAL ADVICE (OUTPATIENT)
Dept: FAMILY MEDICINE | Facility: CLINIC | Age: 44
End: 2024-11-05
Payer: COMMERCIAL

## 2024-11-05 DIAGNOSIS — E66.01 MORBID OBESITY (H): Primary | ICD-10-CM

## 2024-11-05 NOTE — TELEPHONE ENCOUNTER
Please see [facility]. Pt is asking for a dose increase of Wegovy due to insurance restrictions.    Natalie Montgomery RN on 11/5/2024 at 1:04 PM     [Liver disease] : no liver disease Bactrim Counseling:  I discussed with the patient the risks of sulfa antibiotics including but not limited to GI upset, allergic reaction, drug rash, diarrhea, dizziness, photosensitivity, and yeast infections.  Rarely, more serious reactions can occur including but not limited to aplastic anemia, agranulocytosis, methemoglobinemia, blood dyscrasias, liver or kidney failure, lung infiltrates or desquamative/blistering drug rashes.

## 2024-11-11 ENCOUNTER — OFFICE VISIT (OUTPATIENT)
Dept: FAMILY MEDICINE | Facility: CLINIC | Age: 44
End: 2024-11-11
Payer: COMMERCIAL

## 2024-11-11 VITALS
DIASTOLIC BLOOD PRESSURE: 85 MMHG | WEIGHT: 273 LBS | RESPIRATION RATE: 16 BRPM | OXYGEN SATURATION: 98 % | BODY MASS INDEX: 43.73 KG/M2 | TEMPERATURE: 98.4 F | HEART RATE: 61 BPM | SYSTOLIC BLOOD PRESSURE: 131 MMHG

## 2024-11-11 DIAGNOSIS — F33.1 MODERATE EPISODE OF RECURRENT MAJOR DEPRESSIVE DISORDER (H): ICD-10-CM

## 2024-11-11 DIAGNOSIS — E66.01 MORBID OBESITY (H): Primary | ICD-10-CM

## 2024-11-11 DIAGNOSIS — Z02.89 ENCOUNTER FOR COMPLETION OF FORM WITH PATIENT: ICD-10-CM

## 2024-11-11 PROCEDURE — 90480 ADMN SARSCOV2 VAC 1/ONLY CMP: CPT | Performed by: STUDENT IN AN ORGANIZED HEALTH CARE EDUCATION/TRAINING PROGRAM

## 2024-11-11 PROCEDURE — 90471 IMMUNIZATION ADMIN: CPT | Performed by: STUDENT IN AN ORGANIZED HEALTH CARE EDUCATION/TRAINING PROGRAM

## 2024-11-11 PROCEDURE — 90673 RIV3 VACCINE NO PRESERV IM: CPT | Performed by: STUDENT IN AN ORGANIZED HEALTH CARE EDUCATION/TRAINING PROGRAM

## 2024-11-11 PROCEDURE — 91320 SARSCV2 VAC 30MCG TRS-SUC IM: CPT | Performed by: STUDENT IN AN ORGANIZED HEALTH CARE EDUCATION/TRAINING PROGRAM

## 2024-11-11 PROCEDURE — 99214 OFFICE O/P EST MOD 30 MIN: CPT | Mod: 25 | Performed by: STUDENT IN AN ORGANIZED HEALTH CARE EDUCATION/TRAINING PROGRAM

## 2024-11-11 ASSESSMENT — ANXIETY QUESTIONNAIRES
GAD7 TOTAL SCORE: 14
3. WORRYING TOO MUCH ABOUT DIFFERENT THINGS: SEVERAL DAYS
6. BECOMING EASILY ANNOYED OR IRRITABLE: SEVERAL DAYS
7. FEELING AFRAID AS IF SOMETHING AWFUL MIGHT HAPPEN: NEARLY EVERY DAY
1. FEELING NERVOUS, ANXIOUS, OR ON EDGE: MORE THAN HALF THE DAYS
IF YOU CHECKED OFF ANY PROBLEMS ON THIS QUESTIONNAIRE, HOW DIFFICULT HAVE THESE PROBLEMS MADE IT FOR YOU TO DO YOUR WORK, TAKE CARE OF THINGS AT HOME, OR GET ALONG WITH OTHER PEOPLE: VERY DIFFICULT
5. BEING SO RESTLESS THAT IT IS HARD TO SIT STILL: MORE THAN HALF THE DAYS
GAD7 TOTAL SCORE: 14
2. NOT BEING ABLE TO STOP OR CONTROL WORRYING: MORE THAN HALF THE DAYS

## 2024-11-11 ASSESSMENT — PAIN SCALES - GENERAL: PAINLEVEL_OUTOF10: NO PAIN (0)

## 2024-11-11 ASSESSMENT — PATIENT HEALTH QUESTIONNAIRE - PHQ9
SUM OF ALL RESPONSES TO PHQ QUESTIONS 1-9: 10
5. POOR APPETITE OR OVEREATING: NEARLY EVERY DAY

## 2024-11-11 NOTE — PROGRESS NOTES
"  Assessment & Plan     Obesity (BMI 35.0-39.9) with comorbidity (H)  Just started 1.0mg semaglutide and tolerating well. Down 16 lbs from 3 months ago. Is in the midst of working through 's recent severe acute health condition thus hasn't been exercising but will be able to restart soon. Specific plan for goal for exercising 2x weekly; 20 minutes - pilates video or weight/circits, TikTok video. Plan to continue at 1mg dosage for 3 months, follow up at that time to reassess.   - Semaglutide-Weight Management (WEGOVY) 1 MG/0.5ML pen  Dispense: 6 mL; Refill: 0    Moderate episode of recurrent major depressive disorder (H)  PHQ of 10; RAISSA of 14 in setting of 's recent acute health condition. She feels stable on 50mg sertraline. Has support from family members/friends. Declines need for therapy at this time. Continue to monitor.    Encounter for completion of form with patient  Completed FMLA from 11/4 - 11/24 for intermittent leave 1-2x per week for up to 2-3 hours per episode to help patient with .     BMI  Estimated body mass index is 43.73 kg/m  as calculated from the following:    Height as of 8/7/24: 1.683 m (5' 6.25\").    Weight as of this encounter: 123.8 kg (273 lb).     Follow up in 3 months for weight recheck    Jose David Dai MD  [facility]  11/11/2024      Alistair Banks is a 44 year old, presenting for the following health issues:  No chief complaint on file.        8/7/2024     1:05 PM   Additional Questions   Roomed by Kelly COLUNGA     Weight loss  Just increased to 1.0mg dosage on Saturday.   Just gets an initial headache but resolves with tylenol and water.   Definitely feeling the weight loss in clothing and flexibility.   Has exercise bike at home and will be walking again.   Wants to start lifting a couple times a week and also youtube videos  Diet - has switched from sweetened coffee to protein shake in the coffee.  Less snacking, eating , water " after 6PM. Increasing healthier options like pistachios.     Goal for 2x weekly; 20 minutes - pilates video or weight/circits, TikTok video    Mood  Is really stressed with recent 's illness  Has been leaning on family members initially while he was in the hospital  Is now feeling better since that time.   Just went back to work last Monday.   Feels the sertraline dose is adequate, is using coping mechanisms she learned in prior therapy.  Feels ok without returning to therapy at this time    Paperwork completion  Hoping to have FMLA completed        Objective    /85   Pulse 61   Temp 98.4  F (36.9  C) (Oral)   Resp 16   Wt 123.8 kg (273 lb)   SpO2 98%   BMI 43.73 kg/m    Body mass index is 43.73 kg/m .    Physical Exam   GENERAL: healthy, alert and no distress  HEAD: Normocephalic, atraumatic.   EYES: Normal conjunctivae, sclera.   RESP: lungs clear to auscultation - no rales, rhonchi or wheezes  CV: regular rate and rhythm, normal S1 S2, no murmur, click, rub or gallop.   MSK: no gross musculoskeletal defects noted.  SKIN: no suspicious lesions or rashes.  NEURO: CNII-XII grossly intact. No focal deficits.  PSYCH: Groomed, dressed appropriately for weather. Logical, linear thought process. Mood described as stressed with normal affect.     Signed Electronically by: Jose David Dai MD

## 2024-12-26 ENCOUNTER — MYC REFILL (OUTPATIENT)
Dept: FAMILY MEDICINE | Facility: CLINIC | Age: 44
End: 2024-12-26
Payer: COMMERCIAL

## 2024-12-26 DIAGNOSIS — E66.01 MORBID OBESITY (H): ICD-10-CM

## 2024-12-29 ENCOUNTER — MYC MEDICAL ADVICE (OUTPATIENT)
Dept: FAMILY MEDICINE | Facility: CLINIC | Age: 44
End: 2024-12-29
Payer: COMMERCIAL

## 2024-12-30 ENCOUNTER — VIRTUAL VISIT (OUTPATIENT)
Dept: FAMILY MEDICINE | Facility: CLINIC | Age: 44
End: 2024-12-30
Payer: COMMERCIAL

## 2024-12-30 ENCOUNTER — E-VISIT (OUTPATIENT)
Dept: FAMILY MEDICINE | Facility: CLINIC | Age: 44
End: 2024-12-30
Payer: COMMERCIAL

## 2024-12-30 DIAGNOSIS — U07.1 INFECTION DUE TO COVID-19 VIRUS VARIANT OF CONCERN: Primary | ICD-10-CM

## 2024-12-30 DIAGNOSIS — R19.7 DIARRHEA OF PRESUMED INFECTIOUS ORIGIN: ICD-10-CM

## 2024-12-30 PROCEDURE — 99207 PR NON-BILLABLE SERV PER CHARTING: CPT | Performed by: STUDENT IN AN ORGANIZED HEALTH CARE EDUCATION/TRAINING PROGRAM

## 2024-12-30 PROCEDURE — 99214 OFFICE O/P EST MOD 30 MIN: CPT | Mod: 95 | Performed by: STUDENT IN AN ORGANIZED HEALTH CARE EDUCATION/TRAINING PROGRAM

## 2024-12-30 NOTE — PROGRESS NOTES
"Jocelyn is a 44 year old who is being evaluated via a billable video visit.    How would you like to obtain your AVS? MyChart  If the video visit is dropped, the invitation should be resent by: Text to cell phone: [phone]  Will anyone else be joining your video visit? No    Assessment & Plan     Infection due to COVID-19 virus variant of concern  Day 4 of symptoms. Discussed r/b of Paxlovid and potential for rebound COVID symptoms ( with complex medical hx). Patient-centered plan made to treat conservatively, isolate at home using CDC recommended guidelines. RTC if symptoms worsen/red flag symptoms/signs develop.    Diarrhea of presumed infectious origin  10 episodes of diarrhea yesterday. None yet today though. Dark urine this morning but is urinating more frequently than q6 hours. States she is drinking plenty of water w/o vomiting. Discussed recommendation for ORT (rovided in AVS) and present to ED for symptoms/signs of dehydration. She states understanding and is in agreement.    BMI  Estimated body mass index is 43.73 kg/m  as calculated from the following:    Height as of 8/7/24: 1.683 m (5' 6.25\").    Weight as of 11/11/24: 123.8 kg (273 lb).     Follow up in 8 months for annual physical, earlier as needed    Jose David Dai MD  [facility]  12/30/2024      Subjective   Jocelyn is a 44 year old, presenting for the following health issues:  Covid Concern      12/30/2024     8:24 AM   Additional Questions   Roomed by german caceres   Accompanied by self         12/30/2024     8:24 AM   Patient Reported Additional Medications   Patient reports taking the following new medications na     HPI     Positive for COVID  Symptoms first started on Friday, 4 days ago with runny nose and diarrhea.  No fevers this morning but temperature has been hovering around 100 for the past couple of days.  Started coughing this morning but no SOB. No chest pain.  Currently all head cold symptoms.  Still able to " drink fluids. Had liquid IV powder yesterday with fluids.    Had about 10 episodes of diarrhea yesterday. None yet today.  Urine this AM was a pretty dark color.   Still urinating a few times per day. Did have a liquid IV powder.  Not eating as much. Following the BRAT diet to help. No vomiting.  Son with same symptoms about one week ago but his COVID test was negative.         Objective       Vitals:  No vitals were obtained today due to virtual visit.    Physical Exam   GENERAL: alert and no distress  EYES: Eyes grossly normal to inspection.  No discharge or erythema, or obvious scleral/conjunctival abnormalities.  RESP: No audible wheeze, cough, or visible cyanosis.    SKIN: Visible skin clear. No significant rash, abnormal pigmentation or lesions.  NEURO: Cranial nerves grossly intact.  Mentation and speech appropriate for age.  PSYCH: Appropriate affect, tone, and pace of words    Video-Visit Details    Type of service:  Video Visit   Originating Location (pt. Location): Home  Distant Location (provider location):  On-site  Platform used for Video Visit: Rogers  Signed Electronically by: Jose David Dai MD

## 2024-12-30 NOTE — PATIENT INSTRUCTIONS
Dear Jocelyn,    We are sorry you are not feeling well. Based on the responses you provided, it is recommended that you be seen in-person in clinic so we can better evaluate your symptoms. Please schedule this visit in [employer]. You will have a Schedule Now button in [employer] to help with scheduling this appointment. Otherwise, you can call [phone] to schedule an appointment.     You will not be charged for this eVisit. Thank you for trusting us with your care.     Jose David Dai MD    Coronavirus (COVID-19): Care Instructions  What is COVID-19?  COVID-19 is a disease caused by a type of coronavirus. This illness was first found in 2019 and has since spread worldwide (pandemic). Symptoms can range from mild, such as fever and body aches, to severe, including trouble breathing. COVID-19 can be deadly.  Coronaviruses are a large group of viruses. Some types cause the common cold. Others cause more serious illnesses like Middle East respiratory syndrome (MERS) and severe acute respiratory syndrome (SARS).  Follow-up care is a key part of your treatment and safety. Be sure to make and go to all appointments, and call your doctor if you are having problems. It's also a good idea to know your test results and keep a list of the medicines you take.  How can you self-isolate when you have COVID-19?  If you have COVID-19, there are things you can do to help avoid spreading the virus to others.  Stay home, and avoid contact with other people.  Limit contact with people in your home. If possible, stay in a separate bedroom and use a separate bathroom.  Wear a high-quality mask when you are around other people.  Improve airflow. If you have to spend time indoors with others, open windows and doors. Or you can use a fan to blow air away from people and out a window.  Avoid contact with pets and other animals.  Cover your mouth and nose with a tissue when you cough or sneeze. Then throw it in the trash right away.  Wash your  "hands often, especially after you cough or sneeze. Use soap and water, and scrub for at least 20 seconds. If soap and water aren't available, use an alcohol-based hand .  Don't share personal household items. These include bedding, towels, cups and glasses, and eating utensils.  Wash laundry in the warmest water allowed for the fabric type, and dry it completely. It's okay to wash other people's laundry with yours.  Clean and disinfect your home. Use household  and disinfectant wipes or sprays.  Go to the CDC website at cdc.gov if you have questions.  When can you end self-isolation for COVID-19?  If you know or think that you have the virus, you may need to self-isolate. When you can be around other people you live with and leave home depends on whether you have symptoms.  If you tested positive but had no symptoms, wear a mask for at least 5 days.  If you have symptoms, you need to wait until your symptoms are getting better and you haven't had a fever for 24 hours while not taking medicines to lower the fever. Once you leave isolation, wear a mask for at least 5 more days when you are around other people.  If you were very sick, were in the hospital for COVID, or have a weakened immune system, talk to your doctor about how long you should isolate and wear a mask. It might be longer than 5 days.  Call your doctor or seek care if you have questions about your symptoms or when to end isolation.  Check the CDC website at cdc.gov for the most current information.  Where can you learn more?  Go to https://www.[employer].net/patiented  Enter C007 in the search box to learn more about \"Coronavirus (COVID-19): Care Instructions.\"  Current as of: October 28, 2024  Content Version: 14.3    2024 [employer].   Care instructions adapted under license by your healthcare professional. If you have questions about a medical condition or this instruction, always ask your healthcare professional. [employer] disclaims any warranty or liability for your use of this information.

## 2024-12-30 NOTE — PATIENT INSTRUCTIONS
Oral Rehydration Therapy    Base Beverage: Chicken Broth  2 c. Liquid broth (not low sodium)  2 c. water  2 tablespoons sugar    Base Beverage: Gatorade  32oz Gatorade G2  3/4 teaspoon table salt    Base Beverage: Water  1 quart water  1/4 teaspoon table salt  2 tablespoons sugar    Home isolation information:  https://archive.cdc.gov/www_cdc_gov/coronavirus/2019-ncov/your-health/isolation.html

## 2024-12-30 NOTE — TELEPHONE ENCOUNTER
MC sent advising evisit for covid treatment option.    Dalia Harmon RN, BSN  [facility]

## 2025-01-08 DIAGNOSIS — N92.4 EXCESSIVE BLEEDING IN PREMENOPAUSAL PERIOD: ICD-10-CM

## 2025-01-09 RX ORDER — NORGESTIMATE AND ETHINYL ESTRADIOL 0.25-0.035
KIT ORAL
Qty: 112 TABLET | Refills: 0 | Status: SHIPPED | OUTPATIENT
Start: 2025-01-09

## 2025-01-10 ENCOUNTER — MYC MEDICAL ADVICE (OUTPATIENT)
Dept: FAMILY MEDICINE | Facility: CLINIC | Age: 45
End: 2025-01-10
Payer: COMMERCIAL

## 2025-01-10 NOTE — TELEPHONE ENCOUNTER
Routing to Dr. Main. Please see pt's MCM and advise on switching weight loss med.     Marj Angelo, LEEN, RN     [facility]    01/10/2025 at 12:07 PM

## 2025-01-13 NOTE — TELEPHONE ENCOUNTER
Brief chart review.    Needs appointment to discuss possible transition. Ok to use earlier appointment. (I do have one available this Friday, 1/17/25 at 8:40AM if this works for her. Can override hold.    Thanks,    Jose David Dai MD  [facility] [place]  1/13/2025

## 2025-02-13 DIAGNOSIS — F33.1 MODERATE EPISODE OF RECURRENT MAJOR DEPRESSIVE DISORDER (H): ICD-10-CM

## 2025-04-16 ASSESSMENT — ANXIETY QUESTIONNAIRES
GAD7 TOTAL SCORE: 10
IF YOU CHECKED OFF ANY PROBLEMS ON THIS QUESTIONNAIRE, HOW DIFFICULT HAVE THESE PROBLEMS MADE IT FOR YOU TO DO YOUR WORK, TAKE CARE OF THINGS AT HOME, OR GET ALONG WITH OTHER PEOPLE: SOMEWHAT DIFFICULT
1. FEELING NERVOUS, ANXIOUS, OR ON EDGE: SEVERAL DAYS
3. WORRYING TOO MUCH ABOUT DIFFERENT THINGS: SEVERAL DAYS
4. TROUBLE RELAXING: MORE THAN HALF THE DAYS
6. BECOMING EASILY ANNOYED OR IRRITABLE: SEVERAL DAYS
5. BEING SO RESTLESS THAT IT IS HARD TO SIT STILL: NEARLY EVERY DAY
GAD7 TOTAL SCORE: 10
7. FEELING AFRAID AS IF SOMETHING AWFUL MIGHT HAPPEN: SEVERAL DAYS
7. FEELING AFRAID AS IF SOMETHING AWFUL MIGHT HAPPEN: SEVERAL DAYS
2. NOT BEING ABLE TO STOP OR CONTROL WORRYING: SEVERAL DAYS
GAD7 TOTAL SCORE: 10
8. IF YOU CHECKED OFF ANY PROBLEMS, HOW DIFFICULT HAVE THESE MADE IT FOR YOU TO DO YOUR WORK, TAKE CARE OF THINGS AT HOME, OR GET ALONG WITH OTHER PEOPLE?: SOMEWHAT DIFFICULT

## 2025-04-21 ENCOUNTER — OFFICE VISIT (OUTPATIENT)
Dept: FAMILY MEDICINE | Facility: CLINIC | Age: 45
End: 2025-04-21
Payer: COMMERCIAL

## 2025-04-21 VITALS
BODY MASS INDEX: 42.43 KG/M2 | DIASTOLIC BLOOD PRESSURE: 75 MMHG | SYSTOLIC BLOOD PRESSURE: 121 MMHG | WEIGHT: 264 LBS | TEMPERATURE: 98.4 F | HEIGHT: 66 IN | OXYGEN SATURATION: 100 % | RESPIRATION RATE: 16 BRPM | HEART RATE: 54 BPM

## 2025-04-21 DIAGNOSIS — R61 NIGHT SWEATS: ICD-10-CM

## 2025-04-21 DIAGNOSIS — E66.01 MORBID OBESITY (H): Primary | ICD-10-CM

## 2025-04-21 DIAGNOSIS — F33.1 MODERATE EPISODE OF RECURRENT MAJOR DEPRESSIVE DISORDER (H): ICD-10-CM

## 2025-04-21 PROCEDURE — 3078F DIAST BP <80 MM HG: CPT | Performed by: STUDENT IN AN ORGANIZED HEALTH CARE EDUCATION/TRAINING PROGRAM

## 2025-04-21 PROCEDURE — 1126F AMNT PAIN NOTED NONE PRSNT: CPT | Performed by: STUDENT IN AN ORGANIZED HEALTH CARE EDUCATION/TRAINING PROGRAM

## 2025-04-21 PROCEDURE — 99214 OFFICE O/P EST MOD 30 MIN: CPT | Performed by: STUDENT IN AN ORGANIZED HEALTH CARE EDUCATION/TRAINING PROGRAM

## 2025-04-21 PROCEDURE — 3074F SYST BP LT 130 MM HG: CPT | Performed by: STUDENT IN AN ORGANIZED HEALTH CARE EDUCATION/TRAINING PROGRAM

## 2025-04-21 ASSESSMENT — PATIENT HEALTH QUESTIONNAIRE - PHQ9: SUM OF ALL RESPONSES TO PHQ QUESTIONS 1-9: 10

## 2025-04-21 ASSESSMENT — PAIN SCALES - GENERAL: PAINLEVEL_OUTOF10: NO PAIN (0)

## 2025-04-21 NOTE — PROGRESS NOTES
"  Assessment & Plan     Obesity (BMI 35.0-39.9) with comorbidity (H)  Tolerating transition to tirzepatide well. Doesn't feel dose is quite as effective. Plan to increase to 7.5mg dosage and follow up in 3 months.   - tirzepatide-Weight Management (ZEPBOUND) 7.5 MG/0.5ML prefilled pen  Dispense: 6 mL; Refill: 0    Moderate episode of recurrent major depressive disorder (H)  Feels stable on 50mg dose of sertraline. Ok to refill.  - sertraline (ZOLOFT) 50 MG tablet  Dispense: 90 tablet; Refill: 1    Night sweats  Mild, occurring maybe 2x a week and associated with sertraline as above and actually improved after stopping hydroxyzine in past. Discussed continuing to monitor, if worsening/persistent/evolving, plan to check labs.     BMI  Estimated body mass index is 42.29 kg/m  as calculated from the following:    Height as of this encounter: 1.683 m (5' 6.25\").    Weight as of this encounter: 119.7 kg (264 lb).     Follow up in three months as already scheduled for annual physical and weight    Jose David Dai MD  [facility]  4/21/2025    Alistair Banks is a 44 year old, presenting for the following health issues:  Weight Management        4/21/2025     6:45 AM   Additional Questions   Roomed by german caceres   Accompanied by self         4/21/2025     6:45 AM   Patient Reported Additional Medications   Patient reports taking the following new medications na     History of Present Illness       Mental Health Follow-up:  Patient presents to follow-up on Depression & Anxiety.Patient's depression since last visit has been:  Medium  The patient is not having other symptoms associated with depression.  Patient's anxiety since last visit has been:  Medium  The patient is not having other symptoms associated with anxiety.  Any significant life events: other  Patient is not feeling anxious or having panic attacks.  Patient has no concerns about alcohol or drug use.    Hyperlipidemia:  She presents for follow up " Patient Consult Note    TIME IN: 9:24  TIME OUT: 9:58    Primary care physician: CARMELO Herbert    Reason for consult: Management of Controlled Type 2 Diabetes    HPI:  Shireen Astorga is a 83 y.o. old patient who comes in today for evaluation of above stated problem.    Most Recent HbA1c and POCT glucose:   Lab Results   Component Value Date/Time    HBA1C 6.3 (A) 12/16/2021 12:00 AM      Recent Labs     07/07/22  0940   POCGLUCOSE 192*       Most Recent Scr:  Lab Results   Component Value Date/Time    CREATININE 0.69 01/04/2022 11:12 AM        Diabetes Medication History and Current Regimen  Jardiance 25mg po daily        Current Exercise - pt is wheelchair bound  Exercise Goal - 30 minutes daily    Dietary - common adult or whatever the lodge feeds her    Foot Exam:  Monofilament exam - current  Monofilament testing with a 10 gram force: sensation intact: decreased bilaterally.    Visual Inspection: Feet without maceration, ulcers, fissures.  Feet dry.  Pedal pulses: intact bilaterally    Preventative Management  BP regimen (ACE/ARB) - none  ASA - none  Statin - atorva 10  Last Retinal Scan - pt just went   Last Foot Exam - current  Last A1c -   Lab Results   Component Value Date/Time    HBA1C 6.3 (A) 12/16/2021 12:00 AM      Last Microalbuminuria -       updated caregaps    Past Medical History:  Patient Active Problem List    Diagnosis Date Noted   • Yeast vaginitis 04/27/2022   • Contracture of hand 04/26/2022   • Vertigo 04/26/2022   • Dysuria 04/14/2022   • Fingertip amputation 02/16/2022   • Type 2 diabetes mellitus with other specified complication ([facility]) 01/25/2022   • 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   She is not taking medication to lower cholesterol. She is not having myalgia or other side effects to statin medications.    Reason for visit:  Follow up for medication zepbound    She eats 2-3 servings of fruits and vegetables daily.She consumes 2 sweetened beverage(s) daily.She exercises with enough effort to increase her heart rate 20 to 29 minutes per day.  She exercises with enough effort to increase her heart rate 3 or less days per week.   She is taking medications regularly.        Weight  Transitioned to tirzepatide.   No significant side effects other than mild headache.   Doesn't feel it is quite as effective though.   Feels the food noise has increased a little bit (not at her prior baseline though).   Fluctuating around 5 lbs.   Is out of the medication completely right now.    Goal:  Started exercising 3x a week.   Got a cold so that interfered with the regimen for a week or so.  Then Isaiah had appendicitis last week which also interfered.  Signed up for a year of [employer] subscription previously so does this along with HIT on [employer] for 20 minutes.  She feels good after completing these workouts.     Mood  Doing well on sertraline.   Had noticed night sweats much more frequently on the higher dose (100mg) along with when she was taking the hydroxyzine too.   Since she has been on the 50mg dosage, she will have sweaty/cold legs or sweating of lower half of body following random crazy dreams during sleep, occuring maybe twice a week. Not drenching night sweats. Not sure if related to menstrual cycle but doesn't think so. Again, this is much improved than when at the higher dose.        Objective    /75   Pulse 54   Temp 98.4  F (36.9  C) (Oral)   Resp 16   Ht 1.683 m (5' 6.25\")   Wt 119.7 kg (264 lb)   LMP 03/28/2025 (Approximate)   SpO2 100%   BMI 42.29 kg/m    Body mass index is 42.29 kg/m .    Physical Exam   GENERAL: healthy, alert and no distress  HEAD: " diabetes mellitus ([facility]) 2021   • Hemiplegia as late effect of cerebrovascular accident (CVA) ([facility]) 2021   • Diabetic retinopathy of both eyes associated with type 2 diabetes mellitus ([facility]) 2021   • Age-related osteoporosis without current pathological fracture 10/25/2012       Past Surgical History:  Past Surgical History:   Procedure Laterality Date   • ABDOMINAL HYSTERECTOMY TOTAL      bilateral salpingo-oophorectomy in 89 and 89   • HIP ARTHROPLASTY MIS TOTAL     • KNEE AMPUTATION BELOW Bilateral        Allergies:  Cefdinir, Codeine, Lactose, Pcn [penicillins], Sulfa drugs, Azithromycin, Gluten meal, and Piperacillin sod-tazobactam so    Social History:  Social History     Socioeconomic History   • Marital status: Unknown     Spouse name: Not on file   • Number of children: Not on file   • Years of education: Not on file   • Highest education level: Not on file   Occupational History   • Not on file   Tobacco Use   • Smoking status: Former Smoker     Quit date: 1988     Years since quittin.5   • Smokeless tobacco: Never Used   Vaping Use   • Vaping Use: Never used   Substance and Sexual Activity   • Alcohol use: No   • Drug use: Not on file   • Sexual activity: Not on file   Other Topics Concern   • Not on file   Social History Narrative   • Not on file     Social Determinants of Health     Financial Resource Strain: Not on file   Food Insecurity: Not on file   Transportation Needs: Not on file   Physical Activity: Not on file   Stress: Not on file   Social Connections: Not on file   Intimate Partner Violence: Not on file   Housing Stability: Not on file       Family History:  Family History   Problem Relation Age of Onset   • Heart Failure Mother    • Heart Disease Father        Medications:    Current Outpatient Medications:   •  gabapentin (NEURONTIN) 600 MG tablet, Take 1 Tablet by mouth 2 times a day., Disp: 180 Tablet, Rfl: 0  •  atorvastatin (LIPITOR) 10 MG Tab, Take 1 Tablet by  Normocephalic, atraumatic.   EYES: Normal conjunctivae, sclera.   RESP: lungs clear to auscultation - no rales, rhonchi or wheezes  CV: regular rate and rhythm, normal S1 S2, no murmur, click, rub or gallop.   MSK: no gross musculoskeletal defects noted.  SKIN: no suspicious lesions or rashes.  NEURO: CNII-XII grossly intact. No focal deficits.  PSYCH: Groomed, dressed appropriately for weather. Normal mood with consistent affect.     Signed Electronically by: Jose David Dai MD   mouth every day., Disp: 100 Tablet, Rfl: 2  •  fluconazole (DIFLUCAN) 150 MG tablet, Take one tablet today, repeat in 3 days if not resolved., Disp: 2 Tablet, Rfl: 1  •  denosumab (PROLIA) 60 MG/ML Solution Prefilled Syringe injection, Inject 1 mL under the skin every 6 months., Disp: 1 mL, Rfl: 0  •  nystatin (MYCOSTATIN) powder, Apply 1 g topically 2 times a day., Disp: 15 g, Rfl: 0  •  miconazole (MONISTAT-7) 100 MG Suppos, Insert 1 Suppository into the vagina every evening., Disp: 7 Suppository, Rfl: 0  •  brimonidine (ALPHAGAN) 0.2 % Solution, INSTILL 1 DROP INTO BOTH EYES TWICE A DAY, Disp: , Rfl:   •  Empagliflozin (JARDIANCE) 25 MG Tab, Take 1 Tablet by mouth every day., Disp: , Rfl:   •  calcium citrate (CALCITRATE) 950 (200 Ca) MG Tab, Take 950 mg by mouth., Disp: , Rfl:   •  vitamin D (VITAMIND D3) 1000 UNIT Tab, Take 1,000 Units by mouth every day., Disp: , Rfl:   •  omeprazole (PRILOSEC) 20 MG delayed-release capsule, Take 20 mg by mouth every day., Disp: , Rfl:   •  acetaminophen (TYLENOL) 500 MG Tab, , Disp: , Rfl:   •  aspirin (ASA) 81 MG Chew Tab chewable tablet, 2 tab(s) orally once a day, Disp: , Rfl:   •  Biotin 10 MG Tab, Take 1 Tablet by mouth every day., Disp: , Rfl:   •  Coenzyme Q10 200 MG Cap, Take 1 Capsule by mouth every day., Disp: , Rfl:   •  meclizine (ANTIVERT) 25 MG Tab, Take 25 mg by mouth., Disp: , Rfl:   •  Cyanocobalamin (B-12) 500 MCG Tab, Take  by mouth., Disp: , Rfl:   •  Calcium Carbonate-Vitamin D 600-200 MG-UNIT Tab, Take  by mouth., Disp: , Rfl:     Labs: Reviewed    Physical Examination:  Vital signs: LMP 10/28/1989  There is no height or weight on file to calculate BMI.    Assessment and Plan:    1. DM2  • FSBG 192 in the office  • Pt is post prandial, and did not have her Jardiance this morning  • Pt was denied patient assistance due to excess income.  Samples provided  •     - Medication changes:  • none     - Lifestyle changes:  • none    Follow Up:  4 weeks    Mary  M Filter    CC:   CARMELO Herbert

## 2025-05-09 ENCOUNTER — HOSPITAL ENCOUNTER (OUTPATIENT)
Dept: ULTRASOUND IMAGING | Facility: CLINIC | Age: 45
Discharge: HOME | End: 2025-05-09
Attending: PHYSICIAN ASSISTANT | Admitting: PHYSICIAN ASSISTANT
Payer: COMMERCIAL

## 2025-05-09 DIAGNOSIS — M79.661 RIGHT CALF PAIN: ICD-10-CM

## 2025-05-09 PROCEDURE — 93971 EXTREMITY STUDY: CPT | Mod: RT

## 2025-05-14 ENCOUNTER — OFFICE VISIT (OUTPATIENT)
Dept: VASCULAR SURGERY | Facility: CLINIC | Age: 45
End: 2025-05-14
Payer: COMMERCIAL

## 2025-05-14 DIAGNOSIS — E66.813 CLASS 3 SEVERE OBESITY DUE TO EXCESS CALORIES WITHOUT SERIOUS COMORBIDITY WITH BODY MASS INDEX (BMI) OF 40.0 TO 44.9 IN ADULT (H): ICD-10-CM

## 2025-05-14 DIAGNOSIS — I83.811 VARICOSE VEINS OF RIGHT LOWER EXTREMITY WITH PAIN: Primary | ICD-10-CM

## 2025-05-14 PROCEDURE — 99203 OFFICE O/P NEW LOW 30 MIN: CPT | Performed by: SURGERY

## 2025-05-14 ASSESSMENT — ENCOUNTER SYMPTOMS
HEARTBURN: 1
EYES NEGATIVE: 1
CONSTIPATION: 1
NIGHT SWEATS: 1
HEMATOLOGIC/LYMPHATIC COMMENTS: BLOODY NOSES
NERVOUS/ANXIOUS: 1
NEUROLOGICAL NEGATIVE: 1
ALLERGIC/IMMUNOLOGIC NEGATIVE: 1
MUSCLE CRAMPS: 1
WEIGHT LOSS: 1
DEPRESSION: 1
COUGH: 1
ENDOCRINE NEGATIVE: 1

## 2025-05-14 NOTE — LETTER
5/14/2025      Jocelyn Weber  [address] [facility] [id number]      Dear Colleague,    Thank you for referring your patient, Jocelyn Weber, to the [facility] [place]. Please see a copy of my visit note below.    VEIN SOLUTIONS CONSULT    Impression:  1.  Symptomatic right leg varicosities (CEAP 3).    2.  Obesity    Plan:  I had a detailed conversation with Jocelyn regarding basic venous pathophysiology.  She has been wearing her knee-high stockings intermittently over the years and on a daily basis for the past few weeks.  I will arrange for a right leg venous competency study in about 2 months once she has completed conservative therapy.  Based on exam I suspect that she would be a candidate for some type of venous intervention.    Total length of this encounter was 30 minutes with time spent reviewing records, interviewing and examining the patient, answering questions, and coordinating a treatment plan.          HPI:   Jocelyn Weber is a 44-year-old female who presented to her primary care provider on 5/9/2025 with complaints of a several day history of worsening right thigh and calf pain with some associated warmth and swelling.  Ultrasound was negative for DVT or superficial thrombophlebitis.  She is referred for venous evaluation.  She had observed a dance competition for several hours and describes herself as having had a long day with likely dehydration.  Following that she developed the pain or charley horse in the lateral aspect of her right calf.  Workup was negative.  She has an extensive familial history of venous disease in both parents.  She is G2, P2.  She works remotely from home as a  for [employer].  She is seated at a computer all day long.  She has worn compression stockings intermittently over the years.      CURRENT MEDICATIONS  Current Outpatient Medications   Medication Sig Dispense Refill     MONO-LINYAH 0.25-35 MG-MCG tablet  TAKE ONE TABLET BY MOUTH ONCE DAILY TAKING CONTINOUSLY 112 tablet 0     multivitamin w/minerals (MULTIVITAMINS W/MINERALS) tablet Take 1 tablet by mouth daily       Omega-3 Fish Oil 500 MG capsule Take 2 capsules daily       sertraline (ZOLOFT) 50 MG tablet Take 1 tablet (50 mg) by mouth daily. 90 tablet 1     tirzepatide-Weight Management (ZEPBOUND) 7.5 MG/0.5ML prefilled pen Inject 0.5 mLs (7.5 mg) subcutaneously every 7 days. 6 mL 0     No current facility-administered medications for this visit.         PAST MEDICAL HISTORY  Past Medical History:   Diagnosis Date     Obesity      Status post  section 2015         PAST SURGICAL HISTORY:  Past Surgical History:   Procedure Laterality Date     BLOOD PATCH N/A 2015    Procedure: EPIDURAL BLOOD PATCH;  Surgeon: GENERIC ANESTHESIA PROVIDER;  Location: [facility] OR     BUNIONECTOMY  96      SECTION  3/12      SECTION N/A 2015    Procedure:  SECTION;  Surgeon: Becky Aceves MD;  Location: [facility] L+D     HC TOOTH EXTRACTION W/FORCEP       TONSILLECTOMY         ALLERGIES     Allergies   Allergen Reactions     Blood Transfusion Related (Informational Only) Other (See Comments)     Patient has a history of a clinically significant antibody against RBC antigens.  A delay in compatible RBCs may occur.     Nkda [No Known Drug Allergy]        FAMILY HISTORY  Family History   Problem Relation Age of Onset     Other - See Comments Mother         hysterectomy d/t pre-cancer uterine polyps      Goiter Mother      Obesity Mother      Family History Negative Father      Aortic dissection Father         smoker     Coronary Artery Disease Father      Substance Abuse Father         Alcoholism     Obesity Father      Cardiomyopathy Maternal Grandmother 65     Atrial fibrillation Maternal Grandmother      Heart Disease Maternal Grandfather      Cerebrovascular Disease Maternal Grandfather      Heart Disease Paternal Grandmother 40      Heart Disease Paternal Grandfather      Cerebrovascular Disease Paternal Grandfather 40     Ovarian Cancer Maternal Aunt      Colon Cancer No family hx of      Breast Cancer No family hx of        SOCIAL HISTORY  Social History     Tobacco Use     Smoking status: Former     Passive exposure: Past     Smokeless tobacco: Never     Tobacco comments:     social smoker in her 20's   Vaping Use     Vaping status: Never Used   Substance Use Topics     Alcohol use: Yes     Comment: occasionally     Drug use: No       ROS:   Review of Systems   Constitutional: Positive for night sweats and weight loss.   HENT:          Sinus infection.   Eyes: Negative.    Cardiovascular:  Positive for leg swelling.   Respiratory:  Positive for cough.    Endocrine: Negative.    Hematologic/Lymphatic:        Bloody noses   Skin: Negative.    Musculoskeletal:  Positive for muscle cramps.   Gastrointestinal:  Positive for constipation and heartburn.   Genitourinary: Negative.    Neurological: Negative.    Psychiatric/Behavioral:  Positive for depression. The patient is nervous/anxious.    Allergic/Immunologic: Negative.          EXAM:  LMP 05/02/2025 (Approximate)   Physical Exam  Constitutional:       Appearance: Normal appearance. She is obese.   HENT:      Head: Normocephalic and atraumatic.   Eyes:      General: No scleral icterus.     Extraocular Movements: Extraocular movements intact.      Pupils: Pupils are equal, round, and reactive to light.   Cardiovascular:      Pulses:           Dorsalis pedis pulses are 2+ on the right side and 2+ on the left side.        Posterior tibial pulses are 2+ on the right side and 2+ on the left side.      Comments: Moderate varicosity beginning on the anteromedial aspect and extending lateral to the knee onto the lateral calf.  Musculoskeletal:         General: Normal range of motion.      Cervical back: Normal range of motion.      Right lower leg: Edema present.   Skin:     General: Skin is warm and  "dry.   Neurological:      General: No focal deficit present.      Mental Status: She is alert and oriented to person, place, and time. Mental status is at baseline.   Psychiatric:         Mood and Affect: Mood normal.         Behavior: Behavior normal.         Thought Content: Thought content normal.         Judgment: Judgment normal.           Labs:  LIPID RESULTS:  Lab Results   Component Value Date    CHOL 237 (H) 08/15/2024    CHOL 243 (H) 07/11/2019    HDL 50 08/15/2024    HDL 45 (L) 07/11/2019     (H) 08/15/2024     (H) 07/11/2019    TRIG 293 (H) 08/15/2024    TRIG 142 07/11/2019    CHOLHDLRATIO 5.7 (H) 04/09/2014       CBC RESULTS:  Lab Results   Component Value Date    WBC 5.4 08/15/2023    WBC 7.1 08/18/2015    RBC 4.98 08/15/2023    RBC 4.16 08/18/2015    HGB 14.2 08/15/2023    HGB 10.7 (L) 08/20/2015    HCT 42.3 08/15/2023    HCT 37.6 08/18/2015    MCV 85 08/15/2023    MCV 90 08/18/2015    MCH 28.5 08/15/2023    MCH 30.3 08/18/2015    MCHC 33.6 08/15/2023    MCHC 33.5 08/18/2015    RDW 13.6 08/15/2023    RDW 13.8 08/18/2015     08/15/2023     (L) 08/18/2015       BMP RESULTS:  Lab Results   Component Value Date     08/15/2024     07/11/2019    POTASSIUM 4.4 08/15/2024    POTASSIUM 3.9 05/18/2022    POTASSIUM 4.3 07/11/2019    CHLORIDE 103 08/15/2024    CHLORIDE 106 05/18/2022    CHLORIDE 106 07/11/2019    CO2 23 08/15/2024    CO2 27 05/18/2022    CO2 27 07/11/2019    ANIONGAP 10 08/15/2024    ANIONGAP 6 05/18/2022    ANIONGAP 7 07/11/2019    GLC 85 08/15/2024    GLC 86 05/18/2022    GLC 92 07/11/2019    BUN 14.2 08/15/2024    BUN 11 05/18/2022    BUN 12 07/11/2019    CR 0.73 08/15/2024    CR 0.76 07/11/2019    GFRESTIMATED >90 08/15/2024    GFRESTIMATED >90 07/11/2019    GFRESTBLACK >90 07/11/2019    MJ 9.2 08/15/2024    MJ 8.8 07/11/2019        A1C RESULTS:  No results found for: \"A1C\"      Imaging:  No results found for this or any previous visit (from the past " 24 hours).        No data recorded      Merrill Dasilva MD            VEIN CLINIC LEG DRAWING:              Again, thank you for allowing me to participate in the care of your patient.        Sincerely,        Merrill Dasilva MD    Electronically signed

## 2025-05-14 NOTE — PROGRESS NOTES
"After Visit Follow Up  Per patient request, faxed her compression hose Rx to [facility] at [phone].   Patient would like 1 pair(s) of Sigvaris Cotton \"Large Long\", Beige, Open-toe, Thigh high, 20-30mmhg compression hose. Fax confirmed.    Patient is aware the compression hose will be shipped to her home address.    Shaista Cowan RN  [facility]  Vein Clinic  "

## 2025-05-14 NOTE — PROGRESS NOTES
VEIN SOLUTIONS CONSULT    Impression:  1.  Symptomatic right leg varicosities (CEAP 3).    2.  Obesity    Plan:  I had a detailed conversation with Jocelyn regarding basic venous pathophysiology.  She has been wearing her knee-high stockings intermittently over the years and on a daily basis for the past few weeks.  I think she would benefit from a thigh-high stocking.  She is given a prescription for thigh-high stockings of 20-30 mmHg pressure.  I recommended a Sigvaris cotton blend in that length.  I will arrange for a right leg venous competency study in about 2 months once she has completed conservative therapy.  Based on exam I suspect that she would be a candidate for some type of venous intervention.    Total length of this encounter was 30 minutes with time spent reviewing records, interviewing and examining the patient, answering questions, and coordinating a treatment plan.          HPI:   Jocelyn Weber is a 44-year-old female who presented to her primary care provider on 5/9/2025 with complaints of a several day history of worsening right thigh and calf pain with some associated warmth and swelling.  Ultrasound was negative for DVT or superficial thrombophlebitis.  She is referred for venous evaluation.  She had observed a dance competition for several hours and describes herself as having had a long day with likely dehydration.  Following that she developed the pain or charley horse in the lateral aspect of her right calf.  Workup was negative.  She has an extensive familial history of venous disease in both parents.  She is G2, P2.  She works remotely from home as a  for [employer].  She is seated at a computer all day long.  She has worn compression stockings intermittently over the years.      CURRENT MEDICATIONS  Current Outpatient Medications   Medication Sig Dispense Refill    MONO-LINYAH 0.25-35 MG-MCG tablet TAKE ONE TABLET BY MOUTH ONCE DAILY TAKING CONTINOUSLY 112  tablet 0    multivitamin w/minerals (MULTIVITAMINS W/MINERALS) tablet Take 1 tablet by mouth daily      Omega-3 Fish Oil 500 MG capsule Take 2 capsules daily      sertraline (ZOLOFT) 50 MG tablet Take 1 tablet (50 mg) by mouth daily. 90 tablet 1    tirzepatide-Weight Management (ZEPBOUND) 7.5 MG/0.5ML prefilled pen Inject 0.5 mLs (7.5 mg) subcutaneously every 7 days. 6 mL 0     No current facility-administered medications for this visit.         PAST MEDICAL HISTORY  Past Medical History:   Diagnosis Date    Obesity     Status post  section 2015         PAST SURGICAL HISTORY:  Past Surgical History:   Procedure Laterality Date    BLOOD PATCH N/A 2015    Procedure: EPIDURAL BLOOD PATCH;  Surgeon: GENERIC ANESTHESIA PROVIDER;  Location: [facility] OR    BUNIONECTOMY  96     SECTION  3/12     SECTION N/A 2015    Procedure:  SECTION;  Surgeon: Becky Aceves MD;  Location: [facility] L+D    HC TOOTH EXTRACTION W/FORCEP      TONSILLECTOMY         ALLERGIES     Allergies   Allergen Reactions    Blood Transfusion Related (Informational Only) Other (See Comments)     Patient has a history of a clinically significant antibody against RBC antigens.  A delay in compatible RBCs may occur.    Nkda [No Known Drug Allergy]        FAMILY HISTORY  Family History   Problem Relation Age of Onset    Other - See Comments Mother         hysterectomy d/t pre-cancer uterine polyps     Goiter Mother     Obesity Mother     Family History Negative Father     Aortic dissection Father         smoker    Coronary Artery Disease Father     Substance Abuse Father         Alcoholism    Obesity Father     Cardiomyopathy Maternal Grandmother 65    Atrial fibrillation Maternal Grandmother     Heart Disease Maternal Grandfather     Cerebrovascular Disease Maternal Grandfather     Heart Disease Paternal Grandmother 40    Heart Disease Paternal Grandfather     Cerebrovascular Disease Paternal Grandfather 40     Ovarian Cancer Maternal Aunt     Colon Cancer No family hx of     Breast Cancer No family hx of        SOCIAL HISTORY  Social History     Tobacco Use    Smoking status: Former     Passive exposure: Past    Smokeless tobacco: Never    Tobacco comments:     social smoker in her 20's   Vaping Use    Vaping status: Never Used   Substance Use Topics    Alcohol use: Yes     Comment: occasionally    Drug use: No       ROS:   Review of Systems   Constitutional: Positive for night sweats and weight loss.   HENT:          Sinus infection.   Eyes: Negative.    Cardiovascular:  Positive for leg swelling.   Respiratory:  Positive for cough.    Endocrine: Negative.    Hematologic/Lymphatic:        Bloody noses   Skin: Negative.    Musculoskeletal:  Positive for muscle cramps.   Gastrointestinal:  Positive for constipation and heartburn.   Genitourinary: Negative.    Neurological: Negative.    Psychiatric/Behavioral:  Positive for depression. The patient is nervous/anxious.    Allergic/Immunologic: Negative.          EXAM:  LMP 05/02/2025 (Approximate)   Physical Exam  Constitutional:       Appearance: Normal appearance. She is obese.   HENT:      Head: Normocephalic and atraumatic.   Eyes:      General: No scleral icterus.     Extraocular Movements: Extraocular movements intact.      Pupils: Pupils are equal, round, and reactive to light.   Cardiovascular:      Pulses:           Dorsalis pedis pulses are 2+ on the right side and 2+ on the left side.        Posterior tibial pulses are 2+ on the right side and 2+ on the left side.      Comments: Moderate varicosity beginning on the anteromedial aspect and extending lateral to the knee onto the lateral calf.  Musculoskeletal:         General: Normal range of motion.      Cervical back: Normal range of motion.      Right lower leg: Edema present.   Skin:     General: Skin is warm and dry.   Neurological:      General: No focal deficit present.      Mental Status: She is alert and  "oriented to person, place, and time. Mental status is at baseline.   Psychiatric:         Mood and Affect: Mood normal.         Behavior: Behavior normal.         Thought Content: Thought content normal.         Judgment: Judgment normal.           Labs:  LIPID RESULTS:  Lab Results   Component Value Date    CHOL 237 (H) 08/15/2024    CHOL 243 (H) 07/11/2019    HDL 50 08/15/2024    HDL 45 (L) 07/11/2019     (H) 08/15/2024     (H) 07/11/2019    TRIG 293 (H) 08/15/2024    TRIG 142 07/11/2019    CHOLHDLRATIO 5.7 (H) 04/09/2014       CBC RESULTS:  Lab Results   Component Value Date    WBC 5.4 08/15/2023    WBC 7.1 08/18/2015    RBC 4.98 08/15/2023    RBC 4.16 08/18/2015    HGB 14.2 08/15/2023    HGB 10.7 (L) 08/20/2015    HCT 42.3 08/15/2023    HCT 37.6 08/18/2015    MCV 85 08/15/2023    MCV 90 08/18/2015    MCH 28.5 08/15/2023    MCH 30.3 08/18/2015    MCHC 33.6 08/15/2023    MCHC 33.5 08/18/2015    RDW 13.6 08/15/2023    RDW 13.8 08/18/2015     08/15/2023     (L) 08/18/2015       BMP RESULTS:  Lab Results   Component Value Date     08/15/2024     07/11/2019    POTASSIUM 4.4 08/15/2024    POTASSIUM 3.9 05/18/2022    POTASSIUM 4.3 07/11/2019    CHLORIDE 103 08/15/2024    CHLORIDE 106 05/18/2022    CHLORIDE 106 07/11/2019    CO2 23 08/15/2024    CO2 27 05/18/2022    CO2 27 07/11/2019    ANIONGAP 10 08/15/2024    ANIONGAP 6 05/18/2022    ANIONGAP 7 07/11/2019    GLC 85 08/15/2024    GLC 86 05/18/2022    GLC 92 07/11/2019    BUN 14.2 08/15/2024    BUN 11 05/18/2022    BUN 12 07/11/2019    CR 0.73 08/15/2024    CR 0.76 07/11/2019    GFRESTIMATED >90 08/15/2024    GFRESTIMATED >90 07/11/2019    GFRESTBLACK >90 07/11/2019    MJ 9.2 08/15/2024    MJ 8.8 07/11/2019        A1C RESULTS:  No results found for: \"A1C\"      Imaging:  No results found for this or any previous visit (from the past 24 hours).        No data recorded      Merrill Dasilva MD      "

## 2025-05-28 ENCOUNTER — MYC REFILL (OUTPATIENT)
Dept: FAMILY MEDICINE | Facility: CLINIC | Age: 45
End: 2025-05-28
Payer: COMMERCIAL

## 2025-05-28 DIAGNOSIS — N92.4 EXCESSIVE BLEEDING IN PREMENOPAUSAL PERIOD: ICD-10-CM

## 2025-05-28 RX ORDER — NORGESTIMATE AND ETHINYL ESTRADIOL 0.25-0.035
KIT ORAL
Qty: 112 TABLET | Refills: 0 | Status: SHIPPED | OUTPATIENT
Start: 2025-05-28

## 2025-05-29 RX ORDER — NORGESTIMATE AND ETHINYL ESTRADIOL 0.25-0.035
KIT ORAL
Qty: 112 TABLET | Refills: 0 | OUTPATIENT
Start: 2025-05-29

## 2025-06-20 ENCOUNTER — TRANSFERRED RECORDS (OUTPATIENT)
Dept: HEALTH INFORMATION MANAGEMENT | Facility: CLINIC | Age: 45
End: 2025-06-20
Payer: COMMERCIAL

## 2025-07-13 ENCOUNTER — MYC REFILL (OUTPATIENT)
Dept: FAMILY MEDICINE | Facility: CLINIC | Age: 45
End: 2025-07-13
Payer: COMMERCIAL

## 2025-07-13 DIAGNOSIS — E66.01 MORBID OBESITY (H): ICD-10-CM

## 2025-07-22 ENCOUNTER — ANCILLARY PROCEDURE (OUTPATIENT)
Dept: ULTRASOUND IMAGING | Facility: CLINIC | Age: 45
End: 2025-07-22
Attending: SURGERY
Payer: COMMERCIAL

## 2025-07-22 ENCOUNTER — OFFICE VISIT (OUTPATIENT)
Dept: VASCULAR SURGERY | Facility: CLINIC | Age: 45
End: 2025-07-22
Attending: SURGERY
Payer: COMMERCIAL

## 2025-07-22 DIAGNOSIS — I83.811 VARICOSE VEINS OF RIGHT LOWER EXTREMITY WITH PAIN: Primary | ICD-10-CM

## 2025-07-22 DIAGNOSIS — E66.813 CLASS 3 SEVERE OBESITY DUE TO EXCESS CALORIES WITHOUT SERIOUS COMORBIDITY WITH BODY MASS INDEX (BMI) OF 40.0 TO 44.9 IN ADULT (H): ICD-10-CM

## 2025-07-22 DIAGNOSIS — I83.811 VARICOSE VEINS OF RIGHT LOWER EXTREMITY WITH PAIN: ICD-10-CM

## 2025-07-22 PROCEDURE — 99214 OFFICE O/P EST MOD 30 MIN: CPT | Performed by: SURGERY

## 2025-07-22 PROCEDURE — 93971 EXTREMITY STUDY: CPT | Mod: RT | Performed by: SURGERY

## 2025-07-22 NOTE — PATIENT INSTRUCTIONS
Keila West, Surgery Scheduler - [phone].    Procedure Plan: 1. Right leg VNUS closure GSV, ASV and 2 units Phlebectomies (medically necessary)    Please call our office regarding the status of your insurance authorization if it has been more than 3 weeks and you have not heard from our surgery scheduler.        Pre-Procedure Instructions:                           VNUS Closure and Phlebectomies   You are having a Closure(s) - where one or more veins are closed and Phlebectomies - where one or more veins are removed.   Insurance  Precertification and/or referral authorization may be required by your insurance company.  We will call your insurance company to verify benefits for the medically necessary part of your procedure.    Your Current Medications and Allergies  To reduce bruising, please do not take aspirin-type medications (Motrin, Aleve, Ibuprofen, Advil, etc.) for three days before your procedure. You may take Tylenol if you need a pain reliever.  Are you on blood thinner medications? (Plavix, Coumadin, Eliquis, Xarelto) Please discuss this with your surgeon. You may resume taking your blood thinner medication after your procedure.  Are you sensitive to latex or adhesives used for fake fingernails? Please let us know!    Driving Escort and   Please arrange to have a trusted adult (18 years old or older) drive you to and from the clinic.  For your safety, we recommend you have a trusted adult to stay with you until the next morning.    Your Health  If you have a change in your health before the procedure, contact our office immediately. (For example: cold symptoms, cough, urinary tract infection, fever, flu symptoms.)  A pre-procedure physical is not required.    Note  It is sometimes necessary to adjust the procedure schedule due to emergencies. We greatly appreciate your flexibility and understanding in this matter.                  Check List: The Morning of Your Procedure  ___1. Please do  not put anything on your leg(s) or shave the day of your procedure.  ___2. You may take your normal medications the day of your procedure.  ___3. It is recommended you eat a light breakfast or lunch the day of your procedure.  ___4. Wear comfortable loose-fitting clothing and wide-fitting shoes (i.e. tennis shoes, slip-ons).  ___5. Please arrive at our clinic at the specified time given by the nurse.  ___6. You will sign an affirmation of informed consent.  ___7. Bring your pre-procedure sedation medication (lorazepam and clonidine) with you to the clinic.              One hour before your procedure, you will be instructed to take these medications. The lorazepam              (Ativan) lowers anxiety and sedates you; the clonidine makes the lorazepam more effective. Everyone's              body processes these medications differently. Therefore, reactions to these medications vary. Some              people stay awake and some people sleep through the whole procedure. You may not remember              everything about the procedure or the day. You do not want to make any big decisions for the rest of the              day.    The Day of Your Procedure:       VNUS Closure and Phlebectomies  In the Exam Room  A nurse will bring you back to an exam room with your family member or friend. This is when your informed consent will be signed, and you will take your pre-procedure medications.  You will be asked to remove everything from the waist down, including undergarments. You will then put on a hospital gown or shorts and blue booties.  Your surgeon will come in to answer any questions and kat any bulging varicose veins to be removed.  You will be taken to the restroom to empty your bladder before going into the procedure room.    In the Procedure Room  You will be escorted to the procedure room. You will lie on a procedure table covered with a sheet or blanket.  A nurse will put a blood pressure cuff on your arm and a  pulse/oxygen monitor on a finger. Your vital signs will be monitored every 15 minutes.  Your gown will be pulled up slightly and the groin exposed for a short period of time. The surgeon's assistant will clean your foot, leg, and groin with an antibacterial solution. We will get you covered up as quickly as possible!  Sterile towels and blue drapes will be used to cover you and the table. You will be asked to keep your hands under the blue drapes during the procedure.  The lights will be turned down. The table will be tipped so your head is higher than your feet. You may feel like you're going to slide off, but you won't.    The Procedure  The surgeon will visualize your veins with an ultrasound machine. He or she will then numb your skin and access the vein. A catheter is passed up the vein and positioned with ultrasound guidance. The table will then be tipped head down.  Once the catheter is in the correct position, medication will be injected to numb your leg. You will feel some needle sticks and may feel discomfort as the medication goes in. Once this is done, you should not experience significant discomfort. But if you do, please let us know and more numbing medication can be injected. As the catheter sends out heat, the vein closes off and the catheter is withdrawn.  For the phlebectomy part of the procedure, small incisions are made where the bulging varicose veins have been marked on your leg(s) and these veins will be removed using a small kamari hook instrument.    Post-Procedure  Once the procedure is done, your leg(s) will be washed with warm water and dried. Your leg(s) will be bandaged with large soft dressings and a large ACE bandage wrapped from toes to groin.   You will be offered something to drink and a light snack.  You will rest with your leg(s) elevated for approximately 30 minutes. Your friend or family member may join you.  For your safety, you will be taken to your car in a wheelchair. If  you are able to, it is good to keep your leg(s) elevated on the car ride home.    Post-Procedure Instructions:             VNUS Closure and Phlebectomies      Post-Op Day Zero - The Day of Your Procedure:0  1. Medication for Pain Control and Inflammation Control   - The numbing medication injected during your procedure will last for several hours. The pre-procedure                 tablets may make you very sleepy and you might not remember everything from the procedure or from                 the day. This will usually wear off by the next day.   - Ibuprofen:  If tolerated, take ibuprofen (e.g., Advil) to reduce inflammation whether or not you have                 pain. For three days, take two tablets (200 mg each) with every meal and at bedtime with a snack. If                 your pain is not controlled with ibuprofen, you may take prescription pain medication (such as Norco),                 if prescribed.   - You may resume taking any medications you were taking before your procedure.  2. Activity   - Rest with your leg(s) elevated above your heart. This will prevent from a lot of swelling and                 bleeding. You do not need to elevate your leg(s) while sleeping at night. You may go upstairs, sit up to                 eat, use the bathroom, and take several five-minute walks. Otherwise, keep your leg(s) elevated.                 Minimize the amount of time you are up on your feet to about 30 minutes at a time.  3. Bandages   - The incision sites will be covered with soft bandages and an ACE wrap. Keep your bandages on                 and dry until your post procedure nurse visit. The ACE should provide  snug  compression but should not cause pain or                 numbness in the toes. If you have significant discomfort or your toes become cold or numb, unwrap                 your ACE and rewrap with less tension starting at the toes wrapping upward.  4. Incisions   - Bleeding: You may see some  incision sites that are oozing through the bandages. This is not unusual      and can be managed with Rest, Ice, Compression and Elevation (RICE). Apply ice and firm pressure      directly to the site that is bleeding and rest with your leg(s) elevated above your heart for 20-30                 minutes.    Post-Op Day One:  1. Medication   - Ibuprofen: Continue the same as the Day of Your Procedure. If your pain is not controlled with                 ibuprofen, you may take prescription pain medication (such as Norco), if prescribed.  2. Activity   - We would like you to get up at least six times and walk around for short periods of time, unless it is      causing you pain. You should not be on your feet more than 90 minutes at a time. Elevate your leg      above your heart when you are not walking.  3. Bandages   - Your bandages must be kept on and dry until your post procedure nurse visit..  4. Driving   - You may resume driving when you can do so safely. Do not drive if you are taking narcotic pain      medication.    Post-Op Day Two:   1. Medication  - Ibuprofen: Continue the same as the Day of Your Procedure.  2.  Activity  - Walk as tolerated. Elevate as much as possible when not walking.  3. Bandages and Compression  - Remove ACE wrap and padding. Shower and put on your compression hose during waking hours only       for at least 5 days. (Your doctor may instruct you to keep your bandages on until your return     appointment; please follow your doctor's instructions.)  4. Incisions  - Your leg(s) will be bruised; there may be swelling, hard knots under the skin and possibly some     numbness. These will likely resolve over time. If you see  hair-like  strings coming out of your     incisions, do not pull them (this will only cause pain/discomfort). We will trim them when you come     back for your follow-up appointment.  5. Call Us If:   - You see any areas on your leg that are red and angry in  appearance.   - You notice any drainage that is milky or cloudy in appearance or that has a foul odor.   - You run a temperature of 100.5 or greater.    Post-Op Day Three:  You will have a follow up appointment 2-4 days post-procedure. At this appointment, you will have an ultrasound and we will check your incisions.    ________________________________________________________________________________________    The Two Weeks Following Your Procedure  1.  Skin Care   - Do not use any lotions, creams or powders on your incisions for 14 days or until the incisions have                 healed.   - Do not soak in a bathtub, hot tub or go swimming for 14 days or until your incisions have healed.  2.  Medications   - You may use ibuprofen or acetaminophen (e.g., Tylenol) as needed for pain or discomfort.  3.  Activity   - Do not lift over 25 pounds. After about two weeks you may resume exercise such as aerobics,                 running, tennis or weightlifting. Use your common sense and ease back into your exercise routine                 slowly.   - You may feel a cord-like tightness along the inside of your leg. Gentle stretching can be helpful.  4. Compression Hose   - Your doctor may instruct you to wear compression for longer than seven days; please follow your                 doctor's instructions. As a comfort measure, you may choose to wear compression for longer than                 required.  5.  Travel   - Do not fly in an airplane for 14 days after your procedure. If you have a long car trip planned within                 two to three weeks following your procedure, stop and walk for a few minutes every two hours.      Periodic ankle pumps during the ride may be helpful.    Six Week Appointment  - At your six-week appointment, you will see your surgeon for an exam and evaluation. This office visit     will be scheduled when you return for post-op day three return appointment.       Return to Work  1.  If you  work outside the home, you may return to work in a few days depending on the extent of your        procedure, how you tolerate it, and the type of work you perform.  2.  Paperwork: If your employer requires paperwork or you would like a letter written to your employer, please        let us know. We will complete disability type forms at no charge. Please allow five business days for forms        to be completed.

## 2025-07-22 NOTE — LETTER
7/22/2025      Jocelyn Weber  [address] [place] [id number]      Dear Colleague,    Thank you for referring your patient, Jocelyn Weber, to the [facility] [place]. Please see a copy of my visit note below.    Jocelyn Weber is a 45-year-old female evaluated by me on 5/14/2025 for symptomatic right leg varicosities (CEAP 3).  Please see that detailed consultation.  She has completed 3 months of conservative therapy and presents at this time for a right leg venous competency study.  She has an extensive familial history of venous disease and is status post 2 pregnancies.  She works remotely from home as a  for [employer].  She is seated at a computer all day long.    She finds it extremely difficult to wear compression stockings as she has large thighs and the stockings tend to slip down multiple times throughout the day.  They do help somewhat with lower extremity edema but she still describes right leg discomfort, fatigue, and aching by the end of the day.  She sometimes must stop to elevate her legs for symptomatic relief.  Overall I would consider her to be a failure of conservative therapy.  Her past medical history is noncontributory.    Exam:  Overweight female in no acute distress.  Right lower extremity notable for a prominent ropey varicosity beginning on the anteromedial aspect of the distal right thigh coursing both lateral to the knee and medial to the knee and out onto the calf.  Trace ankle edema.  Easily palpable pedal pulses.  The remainder of her physical examination is grossly within normal limits.                Imaging:  Results of right leg venous competency study from today: No clinically significant right leg deep venous pathology.  Incompetence of the right greater saphenous vein from the saphenofemoral junction through the distal calf.  That vein measures 12.8 mm in diameter at the junction and 7.5 mm in diameter at the thigh.   It gives rise to a 6 mm varicose branch in the proximal calf coursing medially with a reflux time of 2.5 minutes.  Incompetence of the right anterior accessory saphenous vein.  That vein measures 7.8 mm in diameter at the junction and 11.9 mm in diameter in the proximal thigh.  It travels in a straight course for 15 cm before breaking through fascia and becoming a 7.6 mm varicosity coursing anteriorly down the thigh.  No other clinically significant right leg superficial venous incompetence.    IMPRESSION:  1.  Symptomatic right leg varicosities (CEAP 3) secondary to incompetence of the right greater saphenous and anterior accessory saphenous veins.  Dimensions as noted above.  Both incompetent veins give off large incompetent branches.  She has failed conservative therapy and her symptoms negatively impact her activities of daily living.    RECOMMENDATION:  I recommend radiofrequency ablation of the right greater saphenous and anterior accessory saphenous veins with medically necessary right leg stab phlebectomies.  I have thoroughly described the specifics of the above-noted procedures including potential complications and the anticipated postprocedural course.  All of her questions were answered.  She verbalizes full understanding to the above and a strong desire to proceed.  Pending preauthorization from her insurance company I would hope to schedule radiofrequency ablation of both the right greater saphenous and anterior accessory saphenous veins with medically necessary right leg stab phlebectomies in a timely manner.    Total length of this encounter was 30 minutes with time spent reviewing records, interviewing and examining the patient, answering questions, and coordinating a treatment plan.    Shane Dasilva MD          VEIN CLINIC LEG DRAWING:              Again, thank you for allowing me to participate in the care of your patient.        Sincerely,        Merrill Dasilva MD    Electronically signed

## 2025-07-22 NOTE — PROGRESS NOTES
Jocelyn Weber is a 45-year-old female evaluated by me on 5/14/2025 for symptomatic right leg varicosities (CEAP 3).  Please see that detailed consultation.  She has completed 3 months of conservative therapy and presents at this time for a right leg venous competency study.  She has an extensive familial history of venous disease and is status post 2 pregnancies.  She works remotely from home as a  for [employer].  She is seated at a computer all day long.    She finds it extremely difficult to wear compression stockings as she has large thighs and the stockings tend to slip down multiple times throughout the day.  They do help somewhat with lower extremity edema but she still describes right leg discomfort, fatigue, and aching by the end of the day.  She sometimes must stop to elevate her legs for symptomatic relief.  Overall I would consider her to be a failure of conservative therapy.  Her past medical history is noncontributory.    Exam:  Overweight female in no acute distress.  Right lower extremity notable for a prominent ropey varicosity beginning on the anteromedial aspect of the distal right thigh coursing both lateral to the knee and medial to the knee and out onto the calf.  Trace ankle edema.  Easily palpable pedal pulses.  The remainder of her physical examination is grossly within normal limits.                Imaging:  Results of right leg venous competency study from today: No clinically significant right leg deep venous pathology.  Incompetence of the right greater saphenous vein from the saphenofemoral junction through the distal calf.  That vein measures 12.8 mm in diameter at the junction and 7.5 mm in diameter at the thigh.  It gives rise to a 6 mm varicose branch in the proximal calf coursing medially with a reflux time of 2.5 minutes.  Incompetence of the right anterior accessory saphenous vein.  That vein measures 7.8 mm in diameter at the junction and 11.9 mm in  diameter in the proximal thigh.  It travels in a straight course for 15 cm before breaking through fascia and becoming a 7.6 mm varicosity coursing anteriorly down the thigh.  No other clinically significant right leg superficial venous incompetence.    IMPRESSION:  1.  Symptomatic right leg varicosities (CEAP 3) secondary to incompetence of the right greater saphenous and anterior accessory saphenous veins.  Dimensions as noted above.  Both incompetent veins give off large incompetent branches.  She has failed conservative therapy and her symptoms negatively impact her activities of daily living.    RECOMMENDATION:  I recommend radiofrequency ablation of the right greater saphenous and anterior accessory saphenous veins with medically necessary right leg stab phlebectomies.  I have thoroughly described the specifics of the above-noted procedures including potential complications and the anticipated postprocedural course.  All of her questions were answered.  She verbalizes full understanding to the above and a strong desire to proceed.  Pending preauthorization from her insurance company I would hope to schedule radiofrequency ablation of both the right greater saphenous and anterior accessory saphenous veins with medically necessary right leg stab phlebectomies in a timely manner.    Total length of this encounter was 30 minutes with time spent reviewing records, interviewing and examining the patient, answering questions, and coordinating a treatment plan.    Shane Dasilva MD

## 2025-07-22 NOTE — PROGRESS NOTES
July 22, 2025    Vein Procedure Recommendation    Spoke with patient in clinic.    Dr. Dasilva has recommended patient to have the following vein procedure(s):     1. Right leg VNUS closure GSV, ASV and 2 units Phlebectomies (medically necessary)      Patient Pre-op Questions:  Preferred Pharmacy: [facility]  Anticoagulant/ASA: No  Artificial Joint or Heart Valve:  No  Open ulcer:  No  Sedation nausea: No      Patient is recommended to wear Thigh High compression hose following her procedure. Discussed compression hose. Pt has thigh high hose.    Handed patient written procedure instructions to review on her own (see After Visit Summary).    Next steps:    Insurance Submission  Informed patient this process could take up to 14 business days, but once approved, the patient will be contacted by our surgery scheduler to schedule the above procedure. Gave patient our surgery scheduler's information.    Informed patient to call our office regarding the status of their insurance authorization if it has been more than 3 weeks and have not heard from our surgery scheduler.    Patient is in agreement with all of the above and has no further questions at this time.    Shaista Cowan RN  [facility]  [facility]in Clinic

## 2025-08-01 ENCOUNTER — ANCILLARY PROCEDURE (OUTPATIENT)
Dept: MAMMOGRAPHY | Facility: CLINIC | Age: 45
End: 2025-08-01
Attending: STUDENT IN AN ORGANIZED HEALTH CARE EDUCATION/TRAINING PROGRAM
Payer: COMMERCIAL

## 2025-08-01 DIAGNOSIS — Z12.31 VISIT FOR SCREENING MAMMOGRAM: ICD-10-CM

## 2025-08-01 PROCEDURE — 77063 BREAST TOMOSYNTHESIS BI: CPT | Mod: TC | Performed by: RADIOLOGY

## 2025-08-01 PROCEDURE — 77067 SCR MAMMO BI INCL CAD: CPT | Mod: TC | Performed by: RADIOLOGY

## 2025-08-08 ENCOUNTER — MYC MEDICAL ADVICE (OUTPATIENT)
Dept: FAMILY MEDICINE | Facility: CLINIC | Age: 45
End: 2025-08-08

## 2025-08-11 ENCOUNTER — PATIENT OUTREACH (OUTPATIENT)
Dept: CARE COORDINATION | Facility: CLINIC | Age: 45
End: 2025-08-11
Payer: COMMERCIAL

## 2025-08-13 ENCOUNTER — PATIENT OUTREACH (OUTPATIENT)
Dept: CARE COORDINATION | Facility: CLINIC | Age: 45
End: 2025-08-13
Payer: COMMERCIAL